# Patient Record
Sex: MALE | Race: WHITE | NOT HISPANIC OR LATINO | Employment: FULL TIME | URBAN - METROPOLITAN AREA
[De-identification: names, ages, dates, MRNs, and addresses within clinical notes are randomized per-mention and may not be internally consistent; named-entity substitution may affect disease eponyms.]

---

## 2021-03-20 ENCOUNTER — HOSPITAL ENCOUNTER (INPATIENT)
Facility: HOSPITAL | Age: 66
LOS: 2 days | Discharge: HOME | End: 2021-03-22
Attending: EMERGENCY MEDICINE | Admitting: HOSPITALIST

## 2021-03-20 ENCOUNTER — APPOINTMENT (OUTPATIENT)
Dept: GENERAL RADIOLOGY | Facility: HOSPITAL | Age: 66
End: 2021-03-20

## 2021-03-20 DIAGNOSIS — N18.31 STAGE 3A CHRONIC KIDNEY DISEASE (HCC): ICD-10-CM

## 2021-03-20 DIAGNOSIS — I20.0 UNSTABLE ANGINA (HCC): Primary | ICD-10-CM

## 2021-03-20 DIAGNOSIS — R07.2 PRECORDIAL PAIN: ICD-10-CM

## 2021-03-20 PROBLEM — I10 ESSENTIAL HYPERTENSION: Chronic | Status: ACTIVE | Noted: 2021-03-20

## 2021-03-20 PROBLEM — N18.30 CKD (CHRONIC KIDNEY DISEASE) STAGE 3, GFR 30-59 ML/MIN (HCC): Chronic | Status: ACTIVE | Noted: 2021-03-20

## 2021-03-20 PROBLEM — Z72.0 TOBACCO ABUSE: Chronic | Status: ACTIVE | Noted: 2021-03-20

## 2021-03-20 PROBLEM — E66.9 OBESITY (BMI 30-39.9): Chronic | Status: ACTIVE | Noted: 2021-03-20

## 2021-03-20 PROBLEM — G47.33 OSA (OBSTRUCTIVE SLEEP APNEA): Chronic | Status: ACTIVE | Noted: 2021-03-20

## 2021-03-20 PROBLEM — E78.5 HYPERLIPIDEMIA: Chronic | Status: ACTIVE | Noted: 2021-03-20

## 2021-03-20 PROBLEM — E11.9 DIABETES MELLITUS (HCC): Chronic | Status: ACTIVE | Noted: 2021-03-20

## 2021-03-20 PROBLEM — I25.10 CAD (CORONARY ARTERY DISEASE): Chronic | Status: ACTIVE | Noted: 2021-03-20

## 2021-03-20 LAB
ANION GAP SERPL CALCULATED.3IONS-SCNC: 10 MMOL/L (ref 5–15)
BASOPHILS # BLD AUTO: 0.1 10*3/MM3 (ref 0–0.2)
BASOPHILS NFR BLD AUTO: 0.7 % (ref 0–1.5)
BUN SERPL-MCNC: 28 MG/DL (ref 8–23)
BUN/CREAT SERPL: 19.6 (ref 7–25)
CALCIUM SPEC-SCNC: 9.7 MG/DL (ref 8.6–10.5)
CHLORIDE SERPL-SCNC: 103 MMOL/L (ref 98–107)
CO2 SERPL-SCNC: 27 MMOL/L (ref 22–29)
CREAT SERPL-MCNC: 1.43 MG/DL (ref 0.76–1.27)
DEPRECATED RDW RBC AUTO: 41.6 FL (ref 37–54)
EOSINOPHIL # BLD AUTO: 0.1 10*3/MM3 (ref 0–0.4)
EOSINOPHIL NFR BLD AUTO: 1.2 % (ref 0.3–6.2)
ERYTHROCYTE [DISTWIDTH] IN BLOOD BY AUTOMATED COUNT: 13.2 % (ref 12.3–15.4)
GFR SERPL CREATININE-BSD FRML MDRD: 49 ML/MIN/1.73
GLUCOSE SERPL-MCNC: 141 MG/DL (ref 65–99)
HCT VFR BLD AUTO: 42.4 % (ref 37.5–51)
HGB BLD-MCNC: 14.5 G/DL (ref 13–17.7)
LYMPHOCYTES # BLD AUTO: 1.6 10*3/MM3 (ref 0.7–3.1)
LYMPHOCYTES NFR BLD AUTO: 15.1 % (ref 19.6–45.3)
MCH RBC QN AUTO: 31.4 PG (ref 26.6–33)
MCHC RBC AUTO-ENTMCNC: 34.2 G/DL (ref 31.5–35.7)
MCV RBC AUTO: 92 FL (ref 79–97)
MONOCYTES # BLD AUTO: 0.7 10*3/MM3 (ref 0.1–0.9)
MONOCYTES NFR BLD AUTO: 6.4 % (ref 5–12)
NEUTROPHILS NFR BLD AUTO: 76.6 % (ref 42.7–76)
NEUTROPHILS NFR BLD AUTO: 8.1 10*3/MM3 (ref 1.7–7)
NRBC BLD AUTO-RTO: 0 /100 WBC (ref 0–0.2)
PLATELET # BLD AUTO: 228 10*3/MM3 (ref 140–450)
PMV BLD AUTO: 9.1 FL (ref 6–12)
POTASSIUM SERPL-SCNC: 3.8 MMOL/L (ref 3.5–5.2)
RBC # BLD AUTO: 4.61 10*6/MM3 (ref 4.14–5.8)
SODIUM SERPL-SCNC: 140 MMOL/L (ref 136–145)
TROPONIN T SERPL-MCNC: <0.01 NG/ML (ref 0–0.03)
WBC # BLD AUTO: 10.5 10*3/MM3 (ref 3.4–10.8)

## 2021-03-20 PROCEDURE — 80048 BASIC METABOLIC PNL TOTAL CA: CPT | Performed by: EMERGENCY MEDICINE

## 2021-03-20 PROCEDURE — G0378 HOSPITAL OBSERVATION PER HR: HCPCS

## 2021-03-20 PROCEDURE — 71045 X-RAY EXAM CHEST 1 VIEW: CPT

## 2021-03-20 PROCEDURE — 93005 ELECTROCARDIOGRAM TRACING: CPT | Performed by: EMERGENCY MEDICINE

## 2021-03-20 PROCEDURE — 84484 ASSAY OF TROPONIN QUANT: CPT | Performed by: EMERGENCY MEDICINE

## 2021-03-20 PROCEDURE — 85025 COMPLETE CBC W/AUTO DIFF WBC: CPT | Performed by: EMERGENCY MEDICINE

## 2021-03-20 PROCEDURE — 25010000002 ENOXAPARIN PER 10 MG: Performed by: INTERNAL MEDICINE

## 2021-03-20 PROCEDURE — 99222 1ST HOSP IP/OBS MODERATE 55: CPT | Performed by: PHYSICIAN ASSISTANT

## 2021-03-20 PROCEDURE — 25010000002 ENOXAPARIN PER 10 MG: Performed by: PHYSICIAN ASSISTANT

## 2021-03-20 PROCEDURE — 99285 EMERGENCY DEPT VISIT HI MDM: CPT

## 2021-03-20 PROCEDURE — U0003 INFECTIOUS AGENT DETECTION BY NUCLEIC ACID (DNA OR RNA); SEVERE ACUTE RESPIRATORY SYNDROME CORONAVIRUS 2 (SARS-COV-2) (CORONAVIRUS DISEASE [COVID-19]), AMPLIFIED PROBE TECHNIQUE, MAKING USE OF HIGH THROUGHPUT TECHNOLOGIES AS DESCRIBED BY CMS-2020-01-R: HCPCS | Performed by: HOSPITALIST

## 2021-03-20 RX ORDER — MAGNESIUM SULFATE HEPTAHYDRATE 40 MG/ML
2 INJECTION, SOLUTION INTRAVENOUS AS NEEDED
Status: DISCONTINUED | OUTPATIENT
Start: 2021-03-20 | End: 2021-03-22 | Stop reason: HOSPADM

## 2021-03-20 RX ORDER — SODIUM CHLORIDE 0.9 % (FLUSH) 0.9 %
10 SYRINGE (ML) INJECTION EVERY 12 HOURS SCHEDULED
Status: DISCONTINUED | OUTPATIENT
Start: 2021-03-20 | End: 2021-03-22 | Stop reason: HOSPADM

## 2021-03-20 RX ORDER — CHOLECALCIFEROL (VITAMIN D3) 125 MCG
5 CAPSULE ORAL NIGHTLY PRN
Status: DISCONTINUED | OUTPATIENT
Start: 2021-03-20 | End: 2021-03-22 | Stop reason: HOSPADM

## 2021-03-20 RX ORDER — INSULIN LISPRO 100 [IU]/ML
0-9 INJECTION, SOLUTION INTRAVENOUS; SUBCUTANEOUS
Status: DISCONTINUED | OUTPATIENT
Start: 2021-03-21 | End: 2021-03-22 | Stop reason: HOSPADM

## 2021-03-20 RX ORDER — ASPIRIN 81 MG/1
324 TABLET, CHEWABLE ORAL ONCE
Status: COMPLETED | OUTPATIENT
Start: 2021-03-20 | End: 2021-03-20

## 2021-03-20 RX ORDER — LOSARTAN POTASSIUM 50 MG/1
50 TABLET ORAL DAILY
Status: DISCONTINUED | OUTPATIENT
Start: 2021-03-21 | End: 2021-03-22 | Stop reason: HOSPADM

## 2021-03-20 RX ORDER — INSULIN LISPRO 100 [IU]/ML
0-9 INJECTION, SOLUTION INTRAVENOUS; SUBCUTANEOUS AS NEEDED
Status: DISCONTINUED | OUTPATIENT
Start: 2021-03-20 | End: 2021-03-22 | Stop reason: HOSPADM

## 2021-03-20 RX ORDER — ATORVASTATIN CALCIUM 20 MG/1
20 TABLET, FILM COATED ORAL DAILY
Status: DISCONTINUED | OUTPATIENT
Start: 2021-03-21 | End: 2021-03-22 | Stop reason: HOSPADM

## 2021-03-20 RX ORDER — POTASSIUM CHLORIDE 20 MEQ/1
40 TABLET, EXTENDED RELEASE ORAL AS NEEDED
Status: DISCONTINUED | OUTPATIENT
Start: 2021-03-20 | End: 2021-03-22 | Stop reason: HOSPADM

## 2021-03-20 RX ORDER — ATORVASTATIN CALCIUM 20 MG/1
20 TABLET, FILM COATED ORAL DAILY
COMMUNITY

## 2021-03-20 RX ORDER — LOSARTAN POTASSIUM 100 MG/1
100 TABLET ORAL DAILY
COMMUNITY
End: 2021-12-26 | Stop reason: HOSPADM

## 2021-03-20 RX ORDER — POTASSIUM CHLORIDE 1.5 G/1.77G
40 POWDER, FOR SOLUTION ORAL AS NEEDED
Status: DISCONTINUED | OUTPATIENT
Start: 2021-03-20 | End: 2021-03-22 | Stop reason: HOSPADM

## 2021-03-20 RX ORDER — ONDANSETRON 4 MG/1
4 TABLET, FILM COATED ORAL EVERY 6 HOURS PRN
Status: DISCONTINUED | OUTPATIENT
Start: 2021-03-20 | End: 2021-03-22 | Stop reason: HOSPADM

## 2021-03-20 RX ORDER — AMLODIPINE BESYLATE 5 MG/1
10 TABLET ORAL DAILY
Status: DISCONTINUED | OUTPATIENT
Start: 2021-03-21 | End: 2021-03-22 | Stop reason: HOSPADM

## 2021-03-20 RX ORDER — AMLODIPINE BESYLATE 10 MG/1
10 TABLET ORAL DAILY
COMMUNITY
End: 2022-09-12

## 2021-03-20 RX ORDER — ACETAMINOPHEN 650 MG/1
650 SUPPOSITORY RECTAL EVERY 4 HOURS PRN
Status: DISCONTINUED | OUTPATIENT
Start: 2021-03-20 | End: 2021-03-22 | Stop reason: HOSPADM

## 2021-03-20 RX ORDER — NICOTINE 21 MG/24HR
1 PATCH, TRANSDERMAL 24 HOURS TRANSDERMAL EVERY 24 HOURS
Status: DISCONTINUED | OUTPATIENT
Start: 2021-03-20 | End: 2021-03-22 | Stop reason: HOSPADM

## 2021-03-20 RX ORDER — MAGNESIUM SULFATE HEPTAHYDRATE 40 MG/ML
4 INJECTION, SOLUTION INTRAVENOUS AS NEEDED
Status: DISCONTINUED | OUTPATIENT
Start: 2021-03-20 | End: 2021-03-22 | Stop reason: HOSPADM

## 2021-03-20 RX ORDER — NICOTINE POLACRILEX 4 MG
15 LOZENGE BUCCAL
Status: DISCONTINUED | OUTPATIENT
Start: 2021-03-20 | End: 2021-03-22 | Stop reason: HOSPADM

## 2021-03-20 RX ORDER — CALCIUM GLUCONATE 20 MG/ML
1 INJECTION, SOLUTION INTRAVENOUS AS NEEDED
Status: DISCONTINUED | OUTPATIENT
Start: 2021-03-20 | End: 2021-03-22 | Stop reason: HOSPADM

## 2021-03-20 RX ORDER — SODIUM CHLORIDE 0.9 % (FLUSH) 0.9 %
10 SYRINGE (ML) INJECTION AS NEEDED
Status: DISCONTINUED | OUTPATIENT
Start: 2021-03-20 | End: 2021-03-22 | Stop reason: HOSPADM

## 2021-03-20 RX ORDER — DEXTROSE MONOHYDRATE 25 G/50ML
25 INJECTION, SOLUTION INTRAVENOUS
Status: DISCONTINUED | OUTPATIENT
Start: 2021-03-20 | End: 2021-03-22 | Stop reason: HOSPADM

## 2021-03-20 RX ORDER — ASPIRIN 81 MG/1
81 TABLET ORAL DAILY
Status: DISCONTINUED | OUTPATIENT
Start: 2021-03-21 | End: 2021-03-21

## 2021-03-20 RX ORDER — CALCIUM GLUCONATE 20 MG/ML
2 INJECTION, SOLUTION INTRAVENOUS AS NEEDED
Status: DISCONTINUED | OUTPATIENT
Start: 2021-03-20 | End: 2021-03-22 | Stop reason: HOSPADM

## 2021-03-20 RX ORDER — ACETAMINOPHEN 325 MG/1
650 TABLET ORAL EVERY 4 HOURS PRN
Status: DISCONTINUED | OUTPATIENT
Start: 2021-03-20 | End: 2021-03-22 | Stop reason: HOSPADM

## 2021-03-20 RX ORDER — NITROGLYCERIN 0.4 MG/1
0.4 TABLET SUBLINGUAL
Status: DISCONTINUED | OUTPATIENT
Start: 2021-03-20 | End: 2021-03-22 | Stop reason: HOSPADM

## 2021-03-20 RX ORDER — ONDANSETRON 2 MG/ML
4 INJECTION INTRAMUSCULAR; INTRAVENOUS EVERY 6 HOURS PRN
Status: DISCONTINUED | OUTPATIENT
Start: 2021-03-20 | End: 2021-03-22 | Stop reason: HOSPADM

## 2021-03-20 RX ORDER — ACETAMINOPHEN 160 MG/5ML
650 SOLUTION ORAL EVERY 4 HOURS PRN
Status: DISCONTINUED | OUTPATIENT
Start: 2021-03-20 | End: 2021-03-22 | Stop reason: HOSPADM

## 2021-03-20 RX ADMIN — ENOXAPARIN SODIUM 40 MG: 40 INJECTION SUBCUTANEOUS at 22:10

## 2021-03-20 RX ADMIN — Medication 10 ML: at 22:10

## 2021-03-20 RX ADMIN — ASPIRIN 324 MG: 81 TABLET, CHEWABLE ORAL at 19:44

## 2021-03-21 ENCOUNTER — APPOINTMENT (OUTPATIENT)
Dept: NUCLEAR MEDICINE | Facility: HOSPITAL | Age: 66
End: 2021-03-21

## 2021-03-21 PROBLEM — I20.0 UNSTABLE ANGINA (HCC): Status: ACTIVE | Noted: 2021-03-20

## 2021-03-21 LAB
ACT BLD: 257 SECONDS (ref 89–137)
ANION GAP SERPL CALCULATED.3IONS-SCNC: 8 MMOL/L (ref 5–15)
BASOPHILS # BLD AUTO: 0.1 10*3/MM3 (ref 0–0.2)
BASOPHILS NFR BLD AUTO: 0.9 % (ref 0–1.5)
BH CV REST NUCLEAR ISOTOPE DOSE: 7.7 MCI
BH CV STRESS BP STAGE 1: NORMAL
BH CV STRESS BP STAGE 2: NORMAL
BH CV STRESS BP STAGE 3: NORMAL
BH CV STRESS BP STAGE 4: NORMAL
BH CV STRESS COMMENTS STAGE 1: NORMAL
BH CV STRESS DOSE REGADENOSON STAGE 1: 0.4
BH CV STRESS DURATION MIN STAGE 1: 1
BH CV STRESS DURATION MIN STAGE 2: 1
BH CV STRESS DURATION MIN STAGE 3: 1
BH CV STRESS DURATION MIN STAGE 4: 1
BH CV STRESS DURATION SEC STAGE 2: 0
BH CV STRESS HR STAGE 1: 120
BH CV STRESS HR STAGE 2: 115
BH CV STRESS HR STAGE 3: 107
BH CV STRESS HR STAGE 4: 100
BH CV STRESS NUCLEAR ISOTOPE DOSE: 21.6 MCI
BH CV STRESS PROTOCOL 1: NORMAL
BH CV STRESS RECOVERY BP: NORMAL MMHG
BH CV STRESS RECOVERY HR: 90 BPM
BH CV STRESS STAGE 1: 1
BH CV STRESS STAGE 2: 2
BH CV STRESS STAGE 3: 3
BH CV STRESS STAGE 4: 4
BUN SERPL-MCNC: 27 MG/DL (ref 8–23)
BUN/CREAT SERPL: 20.3 (ref 7–25)
CALCIUM SPEC-SCNC: 9.2 MG/DL (ref 8.6–10.5)
CHLORIDE SERPL-SCNC: 105 MMOL/L (ref 98–107)
CHOLEST SERPL-MCNC: 99 MG/DL (ref 0–200)
CO2 SERPL-SCNC: 26 MMOL/L (ref 22–29)
CREAT SERPL-MCNC: 1.33 MG/DL (ref 0.76–1.27)
DEPRECATED RDW RBC AUTO: 43.8 FL (ref 37–54)
EOSINOPHIL # BLD AUTO: 0.2 10*3/MM3 (ref 0–0.4)
EOSINOPHIL NFR BLD AUTO: 2.1 % (ref 0.3–6.2)
ERYTHROCYTE [DISTWIDTH] IN BLOOD BY AUTOMATED COUNT: 13.6 % (ref 12.3–15.4)
GFR SERPL CREATININE-BSD FRML MDRD: 54 ML/MIN/1.73
GLUCOSE BLDC GLUCOMTR-MCNC: 141 MG/DL (ref 70–105)
GLUCOSE BLDC GLUCOMTR-MCNC: 97 MG/DL (ref 70–105)
GLUCOSE SERPL-MCNC: 89 MG/DL (ref 65–99)
HCT VFR BLD AUTO: 40.8 % (ref 37.5–51)
HDLC SERPL-MCNC: 35 MG/DL (ref 40–60)
HGB BLD-MCNC: 13.8 G/DL (ref 13–17.7)
LDLC SERPL CALC-MCNC: 31 MG/DL (ref 0–100)
LDLC/HDLC SERPL: 0.61 {RATIO}
LV EF NUC BP: 57 %
LYMPHOCYTES # BLD AUTO: 2.5 10*3/MM3 (ref 0.7–3.1)
LYMPHOCYTES NFR BLD AUTO: 28 % (ref 19.6–45.3)
MAGNESIUM SERPL-MCNC: 2 MG/DL (ref 1.6–2.4)
MAXIMAL PREDICTED HEART RATE: 154 BPM
MCH RBC QN AUTO: 31.1 PG (ref 26.6–33)
MCHC RBC AUTO-ENTMCNC: 33.9 G/DL (ref 31.5–35.7)
MCV RBC AUTO: 91.6 FL (ref 79–97)
MONOCYTES # BLD AUTO: 0.7 10*3/MM3 (ref 0.1–0.9)
MONOCYTES NFR BLD AUTO: 7.3 % (ref 5–12)
NEUTROPHILS NFR BLD AUTO: 5.6 10*3/MM3 (ref 1.7–7)
NEUTROPHILS NFR BLD AUTO: 61.7 % (ref 42.7–76)
NRBC BLD AUTO-RTO: 0.1 /100 WBC (ref 0–0.2)
PERCENT MAX PREDICTED HR: 79.22 %
PLATELET # BLD AUTO: 221 10*3/MM3 (ref 140–450)
PMV BLD AUTO: 9 FL (ref 6–12)
POTASSIUM SERPL-SCNC: 4.2 MMOL/L (ref 3.5–5.2)
QT INTERVAL: 339 MS
RBC # BLD AUTO: 4.45 10*6/MM3 (ref 4.14–5.8)
SARS-COV-2 RNA PNL SPEC NAA+PROBE: NOT DETECTED
SODIUM SERPL-SCNC: 139 MMOL/L (ref 136–145)
STRESS BASELINE BP: NORMAL MMHG
STRESS BASELINE HR: 89 BPM
STRESS PERCENT HR: 93 %
STRESS POST PEAK BP: NORMAL MMHG
STRESS POST PEAK HR: 122 BPM
STRESS TARGET HR: 131 BPM
TRIGL SERPL-MCNC: 213 MG/DL (ref 0–150)
TROPONIN T SERPL-MCNC: <0.01 NG/ML (ref 0–0.03)
VLDLC SERPL-MCNC: 33 MG/DL (ref 5–40)
WBC # BLD AUTO: 9 10*3/MM3 (ref 3.4–10.8)

## 2021-03-21 PROCEDURE — 93018 CV STRESS TEST I&R ONLY: CPT | Performed by: INTERNAL MEDICINE

## 2021-03-21 PROCEDURE — A9500 TC99M SESTAMIBI: HCPCS | Performed by: HOSPITALIST

## 2021-03-21 PROCEDURE — C1887 CATHETER, GUIDING: HCPCS | Performed by: INTERNAL MEDICINE

## 2021-03-21 PROCEDURE — 99152 MOD SED SAME PHYS/QHP 5/>YRS: CPT | Performed by: INTERNAL MEDICINE

## 2021-03-21 PROCEDURE — C1894 INTRO/SHEATH, NON-LASER: HCPCS | Performed by: INTERNAL MEDICINE

## 2021-03-21 PROCEDURE — 99232 SBSQ HOSP IP/OBS MODERATE 35: CPT | Performed by: HOSPITALIST

## 2021-03-21 PROCEDURE — 0 IOPAMIDOL PER 1 ML: Performed by: INTERNAL MEDICINE

## 2021-03-21 PROCEDURE — 93017 CV STRESS TEST TRACING ONLY: CPT

## 2021-03-21 PROCEDURE — G0378 HOSPITAL OBSERVATION PER HR: HCPCS

## 2021-03-21 PROCEDURE — B2181ZZ FLUOROSCOPY OF LEFT INTERNAL MAMMARY BYPASS GRAFT USING LOW OSMOLAR CONTRAST: ICD-10-PCS | Performed by: INTERNAL MEDICINE

## 2021-03-21 PROCEDURE — 027034Z DILATION OF CORONARY ARTERY, ONE ARTERY WITH DRUG-ELUTING INTRALUMINAL DEVICE, PERCUTANEOUS APPROACH: ICD-10-PCS | Performed by: INTERNAL MEDICINE

## 2021-03-21 PROCEDURE — 25010000002 REGADENOSON 0.4 MG/5ML SOLUTION: Performed by: HOSPITALIST

## 2021-03-21 PROCEDURE — 84484 ASSAY OF TROPONIN QUANT: CPT | Performed by: PHYSICIAN ASSISTANT

## 2021-03-21 PROCEDURE — C1769 GUIDE WIRE: HCPCS | Performed by: INTERNAL MEDICINE

## 2021-03-21 PROCEDURE — 25010000002 MIDAZOLAM PER 1 MG: Performed by: INTERNAL MEDICINE

## 2021-03-21 PROCEDURE — 85025 COMPLETE CBC W/AUTO DIFF WBC: CPT | Performed by: PHYSICIAN ASSISTANT

## 2021-03-21 PROCEDURE — C1725 CATH, TRANSLUMIN NON-LASER: HCPCS | Performed by: INTERNAL MEDICINE

## 2021-03-21 PROCEDURE — 93459 L HRT ART/GRFT ANGIO: CPT | Performed by: INTERNAL MEDICINE

## 2021-03-21 PROCEDURE — 92937 PRQ TRLUML REVSC CAB GRF 1: CPT | Performed by: INTERNAL MEDICINE

## 2021-03-21 PROCEDURE — 25010000002 HEPARIN (PORCINE) PER 1000 UNITS: Performed by: INTERNAL MEDICINE

## 2021-03-21 PROCEDURE — 4A023N7 MEASUREMENT OF CARDIAC SAMPLING AND PRESSURE, LEFT HEART, PERCUTANEOUS APPROACH: ICD-10-PCS | Performed by: INTERNAL MEDICINE

## 2021-03-21 PROCEDURE — 0 TECHNETIUM SESTAMIBI: Performed by: HOSPITALIST

## 2021-03-21 PROCEDURE — 80048 BASIC METABOLIC PNL TOTAL CA: CPT | Performed by: PHYSICIAN ASSISTANT

## 2021-03-21 PROCEDURE — 78452 HT MUSCLE IMAGE SPECT MULT: CPT | Performed by: INTERNAL MEDICINE

## 2021-03-21 PROCEDURE — 82962 GLUCOSE BLOOD TEST: CPT

## 2021-03-21 PROCEDURE — B2151ZZ FLUOROSCOPY OF LEFT HEART USING LOW OSMOLAR CONTRAST: ICD-10-PCS | Performed by: INTERNAL MEDICINE

## 2021-03-21 PROCEDURE — C1874 STENT, COATED/COV W/DEL SYS: HCPCS | Performed by: INTERNAL MEDICINE

## 2021-03-21 PROCEDURE — 80061 LIPID PANEL: CPT | Performed by: PHYSICIAN ASSISTANT

## 2021-03-21 PROCEDURE — 83036 HEMOGLOBIN GLYCOSYLATED A1C: CPT | Performed by: PHYSICIAN ASSISTANT

## 2021-03-21 PROCEDURE — 25010000002 FENTANYL CITRATE (PF) 100 MCG/2ML SOLUTION: Performed by: INTERNAL MEDICINE

## 2021-03-21 PROCEDURE — B2131ZZ FLUOROSCOPY OF MULTIPLE CORONARY ARTERY BYPASS GRAFTS USING LOW OSMOLAR CONTRAST: ICD-10-PCS | Performed by: INTERNAL MEDICINE

## 2021-03-21 PROCEDURE — 78452 HT MUSCLE IMAGE SPECT MULT: CPT

## 2021-03-21 PROCEDURE — 83735 ASSAY OF MAGNESIUM: CPT | Performed by: PHYSICIAN ASSISTANT

## 2021-03-21 PROCEDURE — B2111ZZ FLUOROSCOPY OF MULTIPLE CORONARY ARTERIES USING LOW OSMOLAR CONTRAST: ICD-10-PCS | Performed by: INTERNAL MEDICINE

## 2021-03-21 PROCEDURE — 25010000002 HYDROMORPHONE PER 4 MG: Performed by: INTERNAL MEDICINE

## 2021-03-21 PROCEDURE — 85347 COAGULATION TIME ACTIVATED: CPT

## 2021-03-21 PROCEDURE — 99153 MOD SED SAME PHYS/QHP EA: CPT | Performed by: INTERNAL MEDICINE

## 2021-03-21 PROCEDURE — C1760 CLOSURE DEV, VASC: HCPCS | Performed by: INTERNAL MEDICINE

## 2021-03-21 PROCEDURE — 99254 IP/OBS CNSLTJ NEW/EST MOD 60: CPT | Performed by: INTERNAL MEDICINE

## 2021-03-21 PROCEDURE — C9604 PERC D-E COR REVASC T CABG S: HCPCS | Performed by: INTERNAL MEDICINE

## 2021-03-21 DEVICE — XIENCE SIERRA™ EVEROLIMUS ELUTING CORONARY STENT SYSTEM 4.00 MM X 33 MM / RAPID-EXCHANGE
Type: IMPLANTABLE DEVICE | Status: FUNCTIONAL
Brand: XIENCE SIERRA™

## 2021-03-21 RX ORDER — ASPIRIN 81 MG/1
81 TABLET ORAL DAILY
Status: DISCONTINUED | OUTPATIENT
Start: 2021-03-22 | End: 2021-03-22 | Stop reason: HOSPADM

## 2021-03-21 RX ORDER — FENTANYL CITRATE 50 UG/ML
INJECTION, SOLUTION INTRAMUSCULAR; INTRAVENOUS AS NEEDED
Status: DISCONTINUED | OUTPATIENT
Start: 2021-03-21 | End: 2021-03-21 | Stop reason: HOSPADM

## 2021-03-21 RX ORDER — SODIUM CHLORIDE 9 MG/ML
100 INJECTION, SOLUTION INTRAVENOUS CONTINUOUS
Status: DISCONTINUED | OUTPATIENT
Start: 2021-03-21 | End: 2021-03-22

## 2021-03-21 RX ORDER — NITROGLYCERIN 5 MG/ML
INJECTION, SOLUTION INTRAVENOUS AS NEEDED
Status: DISCONTINUED | OUTPATIENT
Start: 2021-03-21 | End: 2021-03-21 | Stop reason: HOSPADM

## 2021-03-21 RX ORDER — HYDROMORPHONE HCL 110MG/55ML
PATIENT CONTROLLED ANALGESIA SYRINGE INTRAVENOUS AS NEEDED
Status: DISCONTINUED | OUTPATIENT
Start: 2021-03-21 | End: 2021-03-21 | Stop reason: HOSPADM

## 2021-03-21 RX ORDER — ACETAMINOPHEN 325 MG/1
650 TABLET ORAL EVERY 4 HOURS PRN
Status: DISCONTINUED | OUTPATIENT
Start: 2021-03-21 | End: 2021-03-21 | Stop reason: SDUPTHER

## 2021-03-21 RX ORDER — MIDAZOLAM HYDROCHLORIDE 1 MG/ML
INJECTION INTRAMUSCULAR; INTRAVENOUS AS NEEDED
Status: DISCONTINUED | OUTPATIENT
Start: 2021-03-21 | End: 2021-03-21 | Stop reason: HOSPADM

## 2021-03-21 RX ORDER — LIDOCAINE HYDROCHLORIDE 20 MG/ML
INJECTION, SOLUTION INFILTRATION; PERINEURAL AS NEEDED
Status: DISCONTINUED | OUTPATIENT
Start: 2021-03-21 | End: 2021-03-21 | Stop reason: HOSPADM

## 2021-03-21 RX ORDER — HEPARIN SODIUM 1000 [USP'U]/ML
INJECTION, SOLUTION INTRAVENOUS; SUBCUTANEOUS AS NEEDED
Status: DISCONTINUED | OUTPATIENT
Start: 2021-03-21 | End: 2021-03-21 | Stop reason: HOSPADM

## 2021-03-21 RX ORDER — ASPIRIN 325 MG
TABLET ORAL AS NEEDED
Status: DISCONTINUED | OUTPATIENT
Start: 2021-03-21 | End: 2021-03-21 | Stop reason: HOSPADM

## 2021-03-21 RX ADMIN — LOSARTAN POTASSIUM 50 MG: 50 TABLET, FILM COATED ORAL at 10:25

## 2021-03-21 RX ADMIN — AMLODIPINE BESYLATE 10 MG: 5 TABLET ORAL at 10:25

## 2021-03-21 RX ADMIN — TECHNETIUM TC 99M SESTAMIBI 1 DOSE: 1 INJECTION INTRAVENOUS at 07:23

## 2021-03-21 RX ADMIN — TECHNETIUM TC 99M SESTAMIBI 1 DOSE: 1 INJECTION INTRAVENOUS at 09:15

## 2021-03-21 RX ADMIN — SODIUM CHLORIDE 100 ML/HR: 9 INJECTION, SOLUTION INTRAVENOUS at 23:28

## 2021-03-21 RX ADMIN — SODIUM CHLORIDE 100 ML/HR: 9 INJECTION, SOLUTION INTRAVENOUS at 18:47

## 2021-03-21 RX ADMIN — ATORVASTATIN CALCIUM 20 MG: 20 TABLET, FILM COATED ORAL at 10:25

## 2021-03-21 RX ADMIN — Medication 10 ML: at 10:24

## 2021-03-21 RX ADMIN — REGADENOSON 0.4 MG: 0.08 INJECTION, SOLUTION INTRAVENOUS at 09:15

## 2021-03-21 RX ADMIN — ASPIRIN 81 MG: 81 TABLET, COATED ORAL at 10:24

## 2021-03-21 RX ADMIN — TICAGRELOR 90 MG: 90 TABLET ORAL at 22:04

## 2021-03-21 RX ADMIN — Medication 10 ML: at 22:07

## 2021-03-21 RX ADMIN — TICAGRELOR 90 MG: 90 TABLET ORAL at 21:45

## 2021-03-21 RX ADMIN — NITROGLYCERIN 0.4 MG: 0.4 TABLET SUBLINGUAL at 09:20

## 2021-03-21 NOTE — PLAN OF CARE
Goal Outcome Evaluation:  Plan of Care Reviewed With: patient     Outcome Summary: patient currently resting with no new complaints, patient to have a myoview this morning

## 2021-03-21 NOTE — PLAN OF CARE
Goal Outcome Evaluation:     Progress: no change  Outcome Summary: Patient had an abnormal myoview this morning. Cardiology consulted and patient is going for cardiac cath today with Dr. Pillai. Patient is stable, no complaints at this time. Patient NPO and consent signed and on the chart. Will continue to monitor.

## 2021-03-21 NOTE — H&P
[facility] Medicine Services      Patient Name: Jamil Self  : 1955  MRN: [id number]  Primary Care Physician: Desiree Almaguer APRN  Date of admission: 3/20/2021    Patient Care Team:  Desiree Almaguer APRN as PCP - General          Subjective   History Present Illness     Chief Complaint:   Chief Complaint   Patient presents with   • Chest Pain         Mr. Self is a 65 y.o. male with past medical history of CAD status post CABG, tobacco abuse, obesity, LIZETTE, hyperlipidemia, hypertension, diabetes and CKD who presents to [facility] complaining of chest pain.  Patient reports that on the evening of 3/20/2021 while at rest he began to experience a substernal pressure which radiated to his left arm rated at 9/10 at its worst along with some diaphoresis.  Pain was noted is somewhat worse if he attempted to exert himself and slightly relieved with rest though remained constant for several hours until he presented to the ED when pain began to resolve.  No dyspnea, nausea or vomiting, cough or fever, sensation of tachycardia or palpitations, syncope or near syncope are reported.  Patient does have a history of previous CABG approximately 15 years prior and states that he has done generally well since that point and is not currently followed by cardiologist.  He reports that he does continue to smoke approximately 1-1/2 packs of cigarettes per day but does not drink alcohol.  Patient is compliant with all of his other outpatient medical therapies.    In the ED patient had lab significant for troponin of less than 0.010, creatinine: 1.43 with a BUN of 28 and a GFR of 49.  Remainder of CBC and CMP was generally unremarkable.  Chest x-ray shows cardiomegaly with no acute cardiopulmonary disease.  EKG shows sinus rhythm at 99 with some mild ST depression in lead I, aVL, V5 and V6 with no other obvious ectopy and a QTC of 435 ms    History of Present Illness    Review of Systems    Constitutional: Positive for diaphoresis. Negative for chills, fever and malaise/fatigue.   HENT: Negative.    Eyes: Negative.    Cardiovascular: Positive for chest pain and leg swelling. Negative for dyspnea on exertion, irregular heartbeat, near-syncope, orthopnea and syncope.   Respiratory: Negative for cough, shortness of breath and sputum production.    Endocrine: Negative.    Skin: Negative.    Musculoskeletal: Negative.    Gastrointestinal: Negative.    Genitourinary: Negative.    Neurological: Negative.    Psychiatric/Behavioral: Negative.            Personal History     Past Medical History: No past medical history on file.    Surgical History:    No past surgical history on file.        Family History: family history is not on file. Otherwise pertinent FHx was reviewed and unremarkable.     Social History:        Medications:  Prior to Admission medications    Not on File       Allergies:  No Known Allergies    Objective   Objective     Vital Signs  Temp:  [97.5 °F (36.4 °C)] 97.5 °F (36.4 °C)  Heart Rate:  [88-97] 88  Resp:  [16-18] 16  BP: (130-187)/(59-97) 146/77  SpO2:  [95 %-97 %] 95 %  on   ;   Device (Oxygen Therapy): room air  Body mass index is 37.13 kg/m².    Physical Exam  Vitals reviewed.   Constitutional:       General: He is not in acute distress.     Appearance: Normal appearance. He is obese. He is not ill-appearing or toxic-appearing.   HENT:      Head: Normocephalic and atraumatic.      Right Ear: External ear normal.      Left Ear: External ear normal.      Nose: Nose normal.      Mouth/Throat:      Mouth: Mucous membranes are moist.   Eyes:      Extraocular Movements: Extraocular movements intact.   Cardiovascular:      Rate and Rhythm: Normal rate and regular rhythm.      Pulses: Normal pulses.      Heart sounds: Normal heart sounds.   Pulmonary:      Effort: Pulmonary effort is normal.      Breath sounds: Wheezing present.   Abdominal:      General: Bowel sounds are normal. There is  no distension.      Tenderness: There is no abdominal tenderness.   Musculoskeletal:         General: Normal range of motion.      Cervical back: Normal range of motion.      Right lower leg: Edema present.      Left lower leg: Edema present.      Comments: Mild lower extremity edema noted   Skin:     General: Skin is warm and dry.   Neurological:      General: No focal deficit present.      Mental Status: He is alert and oriented to person, place, and time.   Psychiatric:         Mood and Affect: Mood normal.         Behavior: Behavior normal.         Thought Content: Thought content normal.         Judgment: Judgment normal.           Results Review:  I have personally reviewed most recent cardiac tracings, lab results and radiology images and interpretations and agree with findings, most notably: Troponin, CBC, CMP, chest x-ray and EKG.    Results from last 7 days   Lab Units 03/20/21 1939   WBC 10*3/mm3 10.50   HEMOGLOBIN g/dL 14.5   HEMATOCRIT % 42.4   PLATELETS 10*3/mm3 228     Results from last 7 days   Lab Units 03/20/21 1939   SODIUM mmol/L 140   POTASSIUM mmol/L 3.8   CHLORIDE mmol/L 103   CO2 mmol/L 27.0   BUN mg/dL 28*   CREATININE mg/dL 1.43*   GLUCOSE mg/dL 141*   CALCIUM mg/dL 9.7   TROPONIN T ng/mL <0.010     Estimated Creatinine Clearance: 68.1 mL/min (A) (by C-G formula based on SCr of 1.43 mg/dL (H)).  Brief Urine Lab Results     None          Microbiology Results (last 10 days)     ** No results found for the last 240 hours. **          ECG/EMG Results (most recent)     Procedure Component Value Units Date/Time    ECG 12 Lead [[id number]] Collected: 03/20/21 1915     Updated: 03/20/21 1918     QT Interval 339 ms     Narrative:      HEART RATE= 99  bpm  RR Interval= 608  ms  IA Interval= 172  ms  P Horizontal Axis= -8  deg  P Front Axis= 57  deg  QRSD Interval= 92  ms  QT Interval= 339  ms  QRS Axis= 20  deg  T Wave Axis= 97  deg  - ABNORMAL ECG -  Sinus rhythm  Probable left atrial  enlargement  Nonspecific T abnormalities, lateral leads  Electronically Signed By:   Date and Time of Study: 2021-03-20 19:15:57                  XR Chest 1 View    Result Date: 3/20/2021    1.  Borderline heart size.  No acute cardiopulmonary disease.   Electronically Signed By-Jose Jane MD On:3/20/2021 7:13 PM This report was finalized on [id number] by  Jose Jane MD.        Estimated Creatinine Clearance: 68.1 mL/min (A) (by C-G formula based on SCr of 1.43 mg/dL (H)).    Assessment/Plan   Assessment/Plan       Active Hospital Problems    Diagnosis  POA   • **Precordial pain [R07.2]  Yes     Priority: High   • Essential hypertension [I10]  Yes     Priority: Medium   • Diabetes mellitus (CMS/HCC) [E11.9]  Yes     Priority: Medium   • CAD (coronary artery disease) [I25.10]  Yes     Priority: Medium   • CKD (chronic kidney disease) stage 3, GFR 30-59 ml/min (CMS/HCC) [N18.30]  Yes     Priority: Medium   • Hyperlipidemia [E78.5]  Yes     Priority: Low   • Tobacco abuse [Z72.0]  Yes     Priority: Low   • LIZETTE (obstructive sleep apnea) [G47.33]  Yes     Priority: Low   • Obesity (BMI 30-39.9) [E66.9]  Yes     Priority: Low      Resolved Hospital Problems   No resolved problems to display.     Chest pain  -Troponin: Less than 0.010, trend  -Chest x-ray shows cardiomegaly with no acute cardiopulmonary process  -EKG shows sinus rhythm at 99 with mild ST depression in lead I, aVL, V5 and V6 with a QTC of 435 ms  -In the ED patient given 324 mg aspirin and Nitropaste  -Check lipid panel  -Daily aspirin ordered  -N.p.o. after midnight  -Stress test ordered    CAD status post CABG  -Continue statin and cardiac monitoring  -Aspirin as above    Hypertension  -Poorly controlled with a blood pressure of 189/97 on admission (though pressure seems to have improved with resolution of pain, patient refused nitroglycerin in the ED)  -Continue losartan, Norvasc and hydrochlorothiazide  - Monitor while admitted    Diabetes  mellitus  -Well controlled with glucose of 141 on admission  - Check A1c  - Hold Metformin  -Sliding scale insulin  -diabetic diet  -monitor AC and HS    CKD  - Creatinine: 1.43, BUN: 28, eGFR: 49  - Avoid nephrotoxic medication and IV dye unless urgently needed  - Monitor BMP and I's and O's    Hyperlipidemia  -Check lipid panel  -Continue statin    Obesity (BMI: 37.13)  -Encourage diet lifestyle modifications    Tobacco abuse  -Patient reports he currently smokes 1.5 packs of cigarettes per day  -Encourage cessation especially in light of patient's comorbid conditions  -Nicotine patch ordered            VTE Prophylaxis -Lovenox   Mechanical Order History:     None      Pharmalogical Order History:     None          CODE STATUS: Full  There are no questions and answers to display.         I discussed the patient's findings and my recommendations with patient and nursing staff.      Signature:Electronically signed by Kalia Montes PA-C, 03/20/21, 9:43 PM EDT.    [facility] Hospitalist Team

## 2021-03-21 NOTE — ED PROVIDER NOTES
Subjective   History of Present Illness  Context: 65-year-old male presents with chest pain.  He states started with symptoms last night.  He describes a pressure.  He reports it improves at times it is worse at times.  Seems to improve with rest worsened with exertion.  He has had some shortness of breath he had broke out in a sweat earlier.  He has had no nausea he does not complain of radiation.  He describes it as just anterior chest.  Location: Anterior chest  Quality: Pressure  Duration: 1 day  Timing: Waxes and wanes  Severity: Mild to moderate   Modifying factors: Improves with rest worsens with exertion  Associated signs and symptoms: Dyspnea, diaphoresis    Review of Systems   Constitutional: Positive for diaphoresis. Negative for fever and unexpected weight change.   HENT: Positive for hearing loss. Negative for congestion and sore throat.    Eyes: Negative for pain and visual disturbance.   Respiratory: Positive for shortness of breath. Negative for cough.    Cardiovascular: Positive for chest pain. Negative for leg swelling.   Gastrointestinal: Negative for abdominal pain, diarrhea and vomiting.   Genitourinary: Negative for dysuria and urgency.   Musculoskeletal: Negative for back pain.   Skin: Negative for rash.   Neurological: Negative for dizziness, weakness and headaches.   Psychiatric/Behavioral: Negative for confusion.       No past medical history on file.  CABG, hypertension, hyperlipidemia  No Known Allergies    No past surgical history on file.    No family history on file.  Social history current smoker  Social History     Socioeconomic History   • Marital status:      Spouse name: Not on file   • Number of children: Not on file   • Years of education: Not on file   • Highest education level: Not on file     Current medications Lipitor Norvasc Cozaar      Objective   Physical Exam  65-year-old male awake alert.  Generally overweight.  Pupils equal round at light.  Oropharynx Colton  membranes moist neck supple chest clear equal breath sounds.  Cardiovascular regular rate and rhythm chest wall nontender abdomen soft nontender.  Extremities without tenderness edema.  Skin without rash noted.  Neurologic exam no focal findings noted.  Procedures           ED Course      Results for orders placed or performed during the hospital encounter of 03/20/21   Basic Metabolic Panel    Specimen: Arm, Right; Blood   Result Value Ref Range    Glucose 141 (H) 65 - 99 mg/dL    BUN 28 (H) 8 - 23 mg/dL    Creatinine 1.43 (H) 0.76 - 1.27 mg/dL    Sodium 140 136 - 145 mmol/L    Potassium 3.8 3.5 - 5.2 mmol/L    Chloride 103 98 - 107 mmol/L    CO2 27.0 22.0 - 29.0 mmol/L    Calcium 9.7 8.6 - 10.5 mg/dL    eGFR Non African Amer 49 (L) >60 mL/min/1.73    BUN/Creatinine Ratio 19.6 7.0 - 25.0    Anion Gap 10.0 5.0 - 15.0 mmol/L   Troponin    Specimen: Arm, Right; Blood   Result Value Ref Range    Troponin T <0.010 0.000 - 0.030 ng/mL   CBC Auto Differential    Specimen: Arm, Right; Blood   Result Value Ref Range    WBC 10.50 3.40 - 10.80 10*3/mm3    RBC 4.61 4.14 - 5.80 10*6/mm3    Hemoglobin 14.5 13.0 - 17.7 g/dL    Hematocrit 42.4 37.5 - 51.0 %    MCV 92.0 79.0 - 97.0 fL    MCH 31.4 26.6 - 33.0 pg    MCHC 34.2 31.5 - 35.7 g/dL    RDW 13.2 12.3 - 15.4 %    RDW-SD 41.6 37.0 - 54.0 fl    MPV 9.1 6.0 - 12.0 fL    Platelets 228 140 - 450 10*3/mm3    Neutrophil % 76.6 (H) 42.7 - 76.0 %    Lymphocyte % 15.1 (L) 19.6 - 45.3 %    Monocyte % 6.4 5.0 - 12.0 %    Eosinophil % 1.2 0.3 - 6.2 %    Basophil % 0.7 0.0 - 1.5 %    Neutrophils, Absolute 8.10 (H) 1.70 - 7.00 10*3/mm3    Lymphocytes, Absolute 1.60 0.70 - 3.10 10*3/mm3    Monocytes, Absolute 0.70 0.10 - 0.90 10*3/mm3    Eosinophils, Absolute 0.10 0.00 - 0.40 10*3/mm3    Basophils, Absolute 0.10 0.00 - 0.20 10*3/mm3    nRBC 0.0 0.0 - 0.2 /100 WBC   ECG 12 Lead   Result Value Ref Range    QT Interval 339 ms     XR Chest 1 View    Result Date: 3/20/2021    1.  Borderline heart  "size.  No acute cardiopulmonary disease.   Electronically Signed By-Jose Jane MD On:3/20/2021 7:13 PM This report was finalized on [id number] by  Jose Jane MD.    Medications   nitroglycerin (NITROSTAT) ointment 1 inch (1 inch Topical Not Given 3/20/21 1944)   aspirin chewable tablet 324 mg (324 mg Oral Given 3/20/21 1944)     /77   Pulse 88   Temp 97.5 °F (36.4 °C)   Resp 16   Ht 181.6 cm (71.5\")   Wt 122 kg (270 lb)   SpO2 95%   BMI 37.13 kg/m²                                          MDM  Chart review: Patient has had office visits for type 2 diabetes with 3 8 chronic kidney disease hypertension and hyperlipidemia  Comorbidity: As per past history  Differential: Chest wall pain, reflux, angina, MI,  My EKG interpretation: Sinus rhythm left atrial abnormality nonspecific ST-T wave abnormality.  Compared to previous no significant change  Lab: CBC without significant abnormality, basic metabolic panel glucose 141 BUN 28 creatinine 1.43 potassium 3.8, troponin negative  Radiology: I reviewed chest x-ray.  No acute cardiopulmonary abnormality noted there is borderline heart size noted.  Discussion/treatment: Patient IV placed.  Was given aspirin and Nitropaste was placed.  He had improvement in his blood pressure with treatment.  Patient's findings were discussed with him.  He was discussed with the PA for the hospitalist.  He was brought in for observation further cardiac evaluation.  His renal function is noted to be at baseline.  Patient was evaluated using appropriate PPE      Final diagnoses:   Precordial pain   Stage 3a chronic kidney disease (CMS/HCC)       ED Disposition  ED Disposition     ED Disposition Condition Comment    Decision to Admit            No follow-up provider specified.       Medication List      No changes were made to your prescriptions during this visit.          Alphonso Crandall MD  03/20/21 2032    "

## 2021-03-21 NOTE — PROGRESS NOTES
"      [facility] Medicine Services Daily Progress Note      Hospitalist Team  LOS 0 days      Patient Care Team:  Desiree Almaguer APRN as PCP - General    Patient Location: 112/1      Subjective   Subjective     Chief Complaint / Subjective  Chief Complaint   Patient presents with   • Chest Pain         Brief Synopsis of Hospital Course/HPI    The patient is a 65-year-old male with h/o CAD s/p CABG, LIZETTE, active tobacco use, obesity, hyperlipidemia, hypertension, DM and CKD stage III.    The patient presented to the emergency room on 3/20/2021 complaining of pressure-like substernal chest pain for several days with intermittent exacerbation.  The patient denies fevers, chills, sweats, cough, orthopnea, PND or increased lower extremity edema.      The ED, chest x-ray ruled out active cardiopulmonary pathology.  COVID-19 was ruled out in the ED.    Date::    3/21/21: Chest pain constant for several days with episodes of worsening chest pain not associated with exertion.  Has not seen a cardiologist for several years.  CABG 15 years ago.  CKD stage III.  LIZETTE.  Stress test ordered.      Review of Systems   All other systems reviewed and are negative.        Objective   Objective      Vital Signs  Temp:  [97.3 °F (36.3 °C)-97.9 °F (36.6 °C)] 97.3 °F (36.3 °C)  Heart Rate:  [82-97] 82  Resp:  [16-18] 18  BP: (121-187)/(59-97) 135/80  Oxygen Therapy  SpO2: 93 %  Pulse Oximetry Type: Intermittent  Device (Oxygen Therapy): room air  Flowsheet Rows      First Filed Value   Admission Height  181.6 cm (71.5\") Documented at 03/20/2021 1824   Admission Weight  122 kg (270 lb) Documented at 03/20/2021 1824        Intake & Output (last 3 days)       03/18 0701 - 03/19 0700 03/19 0701 - 03/20 0700 03/20 0701 - 03/21 0700 03/21 0701 - 03/22 0700            Urine Unmeasured Occurrence   1 x         Lines, Drains & Airways    Active LDAs     Name:   Placement date:   Placement time:   Site:   Days:    Peripheral IV " 03/20/21 1941 Right Antecubital   03/20/21 1941    Antecubital   less than 1                  Physical Exam:    Physical Exam  HENT:      Head: Normocephalic.      Nose: Nose normal.      Mouth/Throat:      Mouth: Mucous membranes are moist.   Eyes:      General: No scleral icterus.     Extraocular Movements: Extraocular movements intact.      Pupils: Pupils are equal, round, and reactive to light.   Cardiovascular:      Rate and Rhythm: Normal rate and regular rhythm.      Pulses: Normal pulses.   Pulmonary:      Effort: Pulmonary effort is normal.      Breath sounds: Normal breath sounds.   Abdominal:      General: Bowel sounds are normal.      Palpations: Abdomen is soft.   Musculoskeletal:         General: Normal range of motion.      Cervical back: Normal range of motion.   Skin:     General: Skin is warm.   Neurological:      General: No focal deficit present.      Mental Status: He is alert and oriented to person, place, and time.   Psychiatric:         Mood and Affect: Mood normal.           Procedures:        Results Review:     I reviewed the patient's new clinical results.      Lab Results (last 24 hours)     Procedure Component Value Units Date/Time    POC Glucose Once [[id number]]  (Normal) Collected: 03/21/21 0704    Specimen: Blood Updated: 03/21/21 0705     Glucose 97 mg/dL      Comment: Serial Number: [id number]:  [id number]       COVID-19,CEPHEID,COR/ELDA/PAD IN-HOUSE(OR EMERGENT/ADD-ON),NP SWAB IN TRANSPORT MEDIA 3-4 HR TAT, RT-PCR - Swab, Nasopharynx [[id number]]  (Normal) Collected: 03/20/21 2106    Specimen: Swab from Nasopharynx Updated: 03/21/21 0423     COVID19 Not Detected    Narrative:      Fact sheet for providers: https://www.fda.gov/media/702917/download     Fact sheet for patients: https://www.fda.gov/media/370817/download    Basic Metabolic Panel [[id number]]  (Abnormal) Collected: 03/21/21 0131    Specimen: Blood Updated: 03/21/21 0240     Glucose 89 mg/dL      BUN 27 mg/dL       Creatinine 1.33 mg/dL      Sodium 139 mmol/L      Potassium 4.2 mmol/L      Comment: Slight hemolysis detected by analyzer. Results may be affected.        Chloride 105 mmol/L      CO2 26.0 mmol/L      Calcium 9.2 mg/dL      eGFR Non African Amer 54 mL/min/1.73      BUN/Creatinine Ratio 20.3     Anion Gap 8.0 mmol/L     Narrative:      GFR Normal >60  Chronic Kidney Disease <60  Kidney Failure <15      Lipid Panel [[id number]]  (Abnormal) Collected: 03/21/21 0131    Specimen: Blood Updated: 03/21/21 0240     Total Cholesterol 99 mg/dL      Triglycerides 213 mg/dL      HDL Cholesterol 35 mg/dL      LDL Cholesterol  31 mg/dL      VLDL Cholesterol 33 mg/dL      LDL/HDL Ratio 0.61    Narrative:      Cholesterol Reference Ranges  (U.S. Department of Health and Human Services ATP III Classifications)    Desirable          <200 mg/dL  Borderline High    200-239 mg/dL  High Risk          >240 mg/dL      Triglyceride Reference Ranges  (U.S. Department of Health and Human Services ATP III Classifications)    Normal           <150 mg/dL  Borderline High  150-199 mg/dL  High             200-499 mg/dL  Very High        >500 mg/dL    HDL Reference Ranges  (U.S. Department of Health and Human Services ATP III Classifcations)    Low     <40 mg/dl (major risk factor for CHD)  High    >60 mg/dl ('negative' risk factor for CHD)        LDL Reference Ranges  (U.S. Department of Health and Human Services ATP III Classifcations)    Optimal          <100 mg/dL  Near Optimal     100-129 mg/dL  Borderline High  130-159 mg/dL  High             160-189 mg/dL  Very High        >189 mg/dL    Troponin [[id number]]  (Normal) Collected: 03/21/21 0131    Specimen: Blood Updated: 03/21/21 0240     Troponin T <0.010 ng/mL     Narrative:      Troponin T Reference Range:  <= 0.03 ng/mL-   Negative for AMI  >0.03 ng/mL-     Abnormal for myocardial necrosis.  Clinicians would have to utilize clinical acumen, EKG, Troponin and serial changes to determine if  it is an Acute Myocardial Infarction or myocardial injury due to an underlying chronic condition.       Results may be falsely decreased if patient taking Biotin.      Magnesium [[id number]]  (Normal) Collected: 03/21/21 0131    Specimen: Blood Updated: 03/21/21 0240     Magnesium 2.0 mg/dL     CBC & Differential [[id number]]  (Normal) Collected: 03/21/21 0131    Specimen: Blood Updated: 03/21/21 0210    Narrative:      The following orders were created for panel order CBC & Differential.  Procedure                               Abnormality         Status                     ---------                               -----------         ------                     CBC Auto Differential[[id number]]        Normal              Final result                 Please view results for these tests on the individual orders.    CBC Auto Differential [[id number]]  (Normal) Collected: 03/21/21 0131    Specimen: Blood Updated: 03/21/21 0210     WBC 9.00 10*3/mm3      RBC 4.45 10*6/mm3      Hemoglobin 13.8 g/dL      Hematocrit 40.8 %      MCV 91.6 fL      MCH 31.1 pg      MCHC 33.9 g/dL      RDW 13.6 %      RDW-SD 43.8 fl      MPV 9.0 fL      Platelets 221 10*3/mm3      Neutrophil % 61.7 %      Lymphocyte % 28.0 %      Monocyte % 7.3 %      Eosinophil % 2.1 %      Basophil % 0.9 %      Neutrophils, Absolute 5.60 10*3/mm3      Lymphocytes, Absolute 2.50 10*3/mm3      Monocytes, Absolute 0.70 10*3/mm3      Eosinophils, Absolute 0.20 10*3/mm3      Basophils, Absolute 0.10 10*3/mm3      nRBC 0.1 /100 WBC     Hemoglobin A1c [[id number]] Collected: 03/21/21 0131    Specimen: Blood Updated: 03/21/21 0207    Basic Metabolic Panel [[id number]]  (Abnormal) Collected: 03/20/21 1939    Specimen: Blood from Arm, Right Updated: 03/20/21 2012     Glucose 141 mg/dL      BUN 28 mg/dL      Creatinine 1.43 mg/dL      Sodium 140 mmol/L      Potassium 3.8 mmol/L      Chloride 103 mmol/L      CO2 27.0 mmol/L      Calcium 9.7 mg/dL      eGFR Non  Amer 49  mL/min/1.73      BUN/Creatinine Ratio 19.6     Anion Gap 10.0 mmol/L     Narrative:      GFR Normal >60  Chronic Kidney Disease <60  Kidney Failure <15      Troponin [[id number]]  (Normal) Collected: 03/20/21 1939    Specimen: Blood from Arm, Right Updated: 03/20/21 2012     Troponin T <0.010 ng/mL     Narrative:      Troponin T Reference Range:  <= 0.03 ng/mL-   Negative for AMI  >0.03 ng/mL-     Abnormal for myocardial necrosis.  Clinicians would have to utilize clinical acumen, EKG, Troponin and serial changes to determine if it is an Acute Myocardial Infarction or myocardial injury due to an underlying chronic condition.       Results may be falsely decreased if patient taking Biotin.      CBC & Differential [[id number]]  (Abnormal) Collected: 03/20/21 1939    Specimen: Blood from Arm, Right Updated: 03/20/21 1946    Narrative:      The following orders were created for panel order CBC & Differential.  Procedure                               Abnormality         Status                     ---------                               -----------         ------                     CBC Auto Differential[[id number]]        Abnormal            Final result                 Please view results for these tests on the individual orders.    CBC Auto Differential [[id number]]  (Abnormal) Collected: 03/20/21 1939    Specimen: Blood from Arm, Right Updated: 03/20/21 1946     WBC 10.50 10*3/mm3      RBC 4.61 10*6/mm3      Hemoglobin 14.5 g/dL      Hematocrit 42.4 %      MCV 92.0 fL      MCH 31.4 pg      MCHC 34.2 g/dL      RDW 13.2 %      RDW-SD 41.6 fl      MPV 9.1 fL      Platelets 228 10*3/mm3      Neutrophil % 76.6 %      Lymphocyte % 15.1 %      Monocyte % 6.4 %      Eosinophil % 1.2 %      Basophil % 0.7 %      Neutrophils, Absolute 8.10 10*3/mm3      Lymphocytes, Absolute 1.60 10*3/mm3      Monocytes, Absolute 0.70 10*3/mm3      Eosinophils, Absolute 0.10 10*3/mm3      Basophils, Absolute 0.10 10*3/mm3      nRBC 0.0 /100 WBC          No results found for: HGBA1C            No results found for: LIPASE  Lab Results   Component Value Date    CHOL 99 03/21/2021    CHLPL 118 07/15/2019    TRIG 213 (H) 03/21/2021    HDL 35 (L) 03/21/2021    LDL 31 03/21/2021       No results found for: INTRAOP, PREDX, FINALDX, COMDX    Microbiology Results (last 10 days)     Procedure Component Value - Date/Time    COVID-19,CEPHEID,COR/ELDA/PAD IN-HOUSE(OR EMERGENT/ADD-ON),NP SWAB IN TRANSPORT MEDIA 3-4 HR TAT, RT-PCR - Swab, Nasopharynx [[id number]]  (Normal) Collected: 03/20/21 2106    Lab Status: Final result Specimen: Swab from Nasopharynx Updated: 03/21/21 0423     COVID19 Not Detected    Narrative:      Fact sheet for providers: https://www.fda.gov/media/337025/download     Fact sheet for patients: https://www.fda.gov/media/014297/download          ECG/EMG Results (most recent)     Procedure Component Value Units Date/Time    ECG 12 Lead [[id number]] Collected: 03/20/21 1915     Updated: 03/20/21 1918     QT Interval 339 ms     Narrative:      HEART RATE= 99  bpm  RR Interval= 608  ms  [place] Interval= 172  ms  P Horizontal Axis= -8  deg  P Front Axis= 57  deg  QRSD Interval= 92  ms  QT Interval= 339  ms  QRS Axis= 20  deg  T Wave Axis= 97  deg  - ABNORMAL ECG -  Sinus rhythm  Probable left atrial enlargement  Nonspecific T abnormalities, lateral leads  Electronically Signed By:   Date and Time of Study: 2021-03-20 19:15:57                  XR Chest 1 View    Result Date: 3/20/2021    1.  Borderline heart size.  No acute cardiopulmonary disease.   Electronically Signed By-Jose Jane MD On:3/20/2021 7:13 PM This report was finalized on [id number] by  Jose Jane MD.          Xrays, labs reviewed personally by physician.    Medication Review:   I have reviewed the patient's current medication list      Scheduled Meds  amLODIPine, 10 mg, Oral, Daily  aspirin, 81 mg, Oral, Daily  atorvastatin, 20 mg, Oral, Daily  enoxaparin, 40 mg, Subcutaneous,  Q24H  insulin lispro, 0-9 Units, Subcutaneous, TID AC  losartan, 50 mg, Oral, Daily  nicotine, 1 patch, Transdermal, Q24H  nitroglycerin, 1 inch, Topical, Once  sodium chloride, 10 mL, Intravenous, Q12H        Meds Infusions       Meds PRN  •  acetaminophen **OR** acetaminophen **OR** acetaminophen  •  Calcium Gluconate-NaCl **AND** calcium gluconate **AND** Calcium, Ionized  •  dextrose  •  dextrose  •  glucagon (human recombinant)  •  insulin lispro **AND** insulin lispro  •  magnesium sulfate **OR** magnesium sulfate **OR** magnesium sulfate  •  melatonin  •  nitroglycerin  •  ondansetron **OR** ondansetron  •  potassium & sodium phosphates **OR** potassium & sodium phosphates  •  potassium chloride  •  potassium chloride  •  sodium chloride        Assessment/Plan   Assessment/Plan     Active Hospital Problems    Diagnosis  POA   • **Precordial pain [R07.2]  Yes   • Essential hypertension [I10]  Yes   • Diabetes mellitus (CMS/[facility]) [E11.9]  Yes   • Hyperlipidemia [E78.5]  Yes   • Tobacco abuse [Z72.0]  Yes   • CAD (coronary artery disease) [I25.10]  Yes   • LIZETTE (obstructive sleep apnea) [G47.33]  Yes   • Obesity (BMI 30-39.9) [E66.9]  Yes   • CKD (chronic kidney disease) stage 3, GFR 30-59 ml/min (CMS/[facility]) [N18.30]  Yes      Resolved Hospital Problems   No resolved problems to display.       MEDICAL DECISION MAKING COMPLEXITY BY PROBLEM:       Chest pain:  -Acute MI ruled out with cardiac enzymes  -Chest x-ray ruled out cardiopulmonary pathology  -Stress test ordered 3/21/2021  -Continue aspirin and nitroglycerin PRN  -Check lipid panel and A1c        CAD s/p CABG  -On Lipitor at home.     Hypertension:  -On amlodipine at home       Diabetes mellitus  -Hold Metformin during hospitalization  -Check A1c  -Continue ISS     CKD III  - Nephrotoxin holds     Hyperlipidemia  -Check lipid panel  -Continue statin     Obesity (BMI: 37.13)  -Encourage diet lifestyle modifications     Tobacco abuse  -Counseled tobacco  cessation             VTE Prophylaxis -   Mechanical Order History:     None      Pharmalogical Order History:      Ordered     Dose Route Frequency Stop    03/20/21 2128  enoxaparin (LOVENOX) syringe 40 mg      40 mg SC Every 24 Hours Scheduled --                  Code Status -   Code Status and Medical Interventions:   Ordered at: 03/20/21 2049     Code Status:    CPR     Medical Interventions (Level of Support Prior to Arrest):    Full       This patient has been examined wearing appropriate Personal Protective Equipment and discussed with nursing. 03/21/21        Discharge Planning  defer        Electronically signed by Arsen Quesada DO, 03/21/21, 09:54 EDT.  [facility] Hospitalist Team

## 2021-03-21 NOTE — CONSULTS
[facility]    Desiree Almaguer APRN    CHIEF COMPLAINT: Chest pain    HISTORY OF PRESENT ILLNESS:    65-year-old male patient with history of CAD status post coronary artery bypass grafting in the past 15 years ago with coronary artery risk factor significant for atherogenic dyslipidemia, current tobacco use, hypertension and diabetes mellitus type 2.  He also has a past medical history significant for CKD's stage III.  Yesterday he is presented to the emergency room with chest pain.  He underwent stress testing which I personally reviewed the images from which showed inducible inferior wall ischemia.  Also reviewed his chest x-ray which showed no active cardiopulmonary process.  His chest pain is associated with mild shortness of breath but no diaphoresis or nausea.    I also reviewed his electrocardiogram from yesterday which showed mild but nondiagnostic ST segment elevation of less than 0.5 mm with reciprocal changes in the limb leads again nondiagnostic.  However interpreted in the setting of the above it is likely this patient has a right coronary artery lesion versus a dominant left circumflex lesion.      No past medical history on file.  No past surgical history on file.  No family history on file.  Social History     Tobacco Use   • Smoking status: Never Smoker   • Smokeless tobacco: Never Used   Substance Use Topics   • Alcohol use: Never   • Drug use: Never     Medications Prior to Admission   Medication Sig Dispense Refill Last Dose   • amLODIPine (NORVASC) 10 MG tablet Take 10 mg by mouth Daily.      • atorvastatin (LIPITOR) 20 MG tablet Take 20 mg by mouth Daily.      • losartan (COZAAR) 50 MG tablet Take 50 mg by mouth Daily.      • metFORMIN (GLUCOPHAGE) 500 MG tablet Take 500 mg by mouth 2 (Two) Times a Day With Meals.        Allergies:  Patient has no known allergies.      Review of Symptoms:  Constitutional: Patient afebrile no chills or unexpected weight  "changes  Respiratory: No cough, no wheezing or dyspnea  Cardiovascular: Today the patient complains of no chest pain, palpitations, dyspnea, orthopnea and no edema  Gastrointestinal: No nausea, vomiting, constipation or diarrhea.  No melena or dark stools    All other systems reviewed and are negative          Vital Signs  Temp:  [97.3 °F (36.3 °C)-98 °F (36.7 °C)] 98 °F (36.7 °C)  Heart Rate:  [] 87  Resp:  [16-18] 18  BP: (121-187)/(59-97) 129/72  Oxygen Therapy  SpO2: 94 %  Pulse Oximetry Type: Intermittent  Device (Oxygen Therapy): room air}  Body mass index is 38.65 kg/m².  Flowsheet Rows      First Filed Value   Admission Height  181.6 cm (71.5\") Documented at 03/20/2021 1824   Admission Weight  122 kg (270 lb) Documented at 03/20/2021 1824             Physical exam  Constitutional: well-nourished, and appears stated age in no acute distress  PERRL: Conjunctiva clear, no pallor, anicteric  HENMT: normocephalic, normal dentition, no cyanosis or pallor  Neck:no bruits, or thrills and bilateral normal carotid upstroke. Normal jugular venous pressure  Cardiovascular: No parasternal heaves an non-displaced focal PMI. Normal rate and rhythm: no rub, gallop, murmur or click and normal S1 and S2; no lower or upper extremity edema.   Lungs: unlabored, no wheezing with no rales or rhonchi on auscultation.  Extremities: Warm, no clubbing, cyanosis. Full and equal peripheral pulses in extremities with no bruits appreciated.   Abdomen: soft, non-tender, non-distended  Musculoskeletal: no joint tenderness or swelling and no erythema  Skin: Warm and dry, non-erythematous   Neuro:alert and normal affect. Oriented to time, place and person.          Results Review:    I reviewed the patient's new clinical results.  Lab Results (most recent)     Procedure Component Value Units Date/Time    POC Glucose Once [[id number]]  (Normal) Collected: 03/21/21 0704    Specimen: Blood Updated: 03/21/21 0705     Glucose 97 mg/dL      " Comment: Serial Number: [id number]:  [id number]       COVID-19,CEPHEID,COR/ELDA/PAD IN-HOUSE(OR EMERGENT/ADD-ON),NP SWAB IN TRANSPORT MEDIA 3-4 HR TAT, RT-PCR - Swab, Nasopharynx [[id number]]  (Normal) Collected: 03/20/21 2106    Specimen: Swab from Nasopharynx Updated: 03/21/21 0423     COVID19 Not Detected    Narrative:      Fact sheet for providers: https://www.fda.gov/media/344239/download     Fact sheet for patients: https://www.fda.gov/media/032187/download    Basic Metabolic Panel [[id number]]  (Abnormal) Collected: 03/21/21 0131    Specimen: Blood Updated: 03/21/21 0240     Glucose 89 mg/dL      BUN 27 mg/dL      Creatinine 1.33 mg/dL      Sodium 139 mmol/L      Potassium 4.2 mmol/L      Comment: Slight hemolysis detected by analyzer. Results may be affected.        Chloride 105 mmol/L      CO2 26.0 mmol/L      Calcium 9.2 mg/dL      eGFR Non African Amer 54 mL/min/1.73      BUN/Creatinine Ratio 20.3     Anion Gap 8.0 mmol/L     Narrative:      GFR Normal >60  Chronic Kidney Disease <60  Kidney Failure <15      Lipid Panel [[id number]]  (Abnormal) Collected: 03/21/21 0131    Specimen: Blood Updated: 03/21/21 0240     Total Cholesterol 99 mg/dL      Triglycerides 213 mg/dL      HDL Cholesterol 35 mg/dL      LDL Cholesterol  31 mg/dL      VLDL Cholesterol 33 mg/dL      LDL/HDL Ratio 0.61    Narrative:      Cholesterol Reference Ranges  (U.S. Department of Health and Human Services ATP III Classifications)    Desirable          <200 mg/dL  Borderline High    200-239 mg/dL  High Risk          >240 mg/dL      Triglyceride Reference Ranges  (U.S. Department of Health and Human Services ATP III Classifications)    Normal           <150 mg/dL  Borderline High  150-199 mg/dL  High             200-499 mg/dL  Very High        >500 mg/dL    HDL Reference Ranges  (U.S. Department of Health and Human Services ATP III Classifcations)    Low     <40 mg/dl (major risk factor for CHD)  High    >60 mg/dl ('negative' risk  factor for CHD)        LDL Reference Ranges  (U.S. Department of Health and Human Services ATP III Classifcations)    Optimal          <100 mg/dL  Near Optimal     100-129 mg/dL  Borderline High  130-159 mg/dL  High             160-189 mg/dL  Very High        >189 mg/dL    Troponin [[id number]]  (Normal) Collected: 03/21/21 0131    Specimen: Blood Updated: 03/21/21 0240     Troponin T <0.010 ng/mL     Narrative:      Troponin T Reference Range:  <= 0.03 ng/mL-   Negative for AMI  >0.03 ng/mL-     Abnormal for myocardial necrosis.  Clinicians would have to utilize clinical acumen, EKG, Troponin and serial changes to determine if it is an Acute Myocardial Infarction or myocardial injury due to an underlying chronic condition.       Results may be falsely decreased if patient taking Biotin.      Magnesium [[id number]]  (Normal) Collected: 03/21/21 0131    Specimen: Blood Updated: 03/21/21 0240     Magnesium 2.0 mg/dL     CBC & Differential [[id number]]  (Normal) Collected: 03/21/21 0131    Specimen: Blood Updated: 03/21/21 0210    Narrative:      The following orders were created for panel order CBC & Differential.  Procedure                               Abnormality         Status                     ---------                               -----------         ------                     CBC Auto Differential[[id number]]        Normal              Final result                 Please view results for these tests on the individual orders.    CBC Auto Differential [[id number]]  (Normal) Collected: 03/21/21 0131    Specimen: Blood Updated: 03/21/21 0210     WBC 9.00 10*3/mm3      RBC 4.45 10*6/mm3      Hemoglobin 13.8 g/dL      Hematocrit 40.8 %      MCV 91.6 fL      MCH 31.1 pg      MCHC 33.9 g/dL      RDW 13.6 %      RDW-SD 43.8 fl      MPV 9.0 fL      Platelets 221 10*3/mm3      Neutrophil % 61.7 %      Lymphocyte % 28.0 %      Monocyte % 7.3 %      Eosinophil % 2.1 %      Basophil % 0.9 %      Neutrophils, Absolute 5.60  10*3/mm3      Lymphocytes, Absolute 2.50 10*3/mm3      Monocytes, Absolute 0.70 10*3/mm3      Eosinophils, Absolute 0.20 10*3/mm3      Basophils, Absolute 0.10 10*3/mm3      nRBC 0.1 /100 WBC     Hemoglobin A1c [[id number]] Collected: 03/21/21 0131    Specimen: Blood Updated: 03/21/21 0207    Basic Metabolic Panel [[id number]]  (Abnormal) Collected: 03/20/21 1939    Specimen: Blood from Arm, Right Updated: 03/20/21 2012     Glucose 141 mg/dL      BUN 28 mg/dL      Creatinine 1.43 mg/dL      Sodium 140 mmol/L      Potassium 3.8 mmol/L      Chloride 103 mmol/L      CO2 27.0 mmol/L      Calcium 9.7 mg/dL      eGFR Non African Amer 49 mL/min/1.73      BUN/Creatinine Ratio 19.6     Anion Gap 10.0 mmol/L     Narrative:      GFR Normal >60  Chronic Kidney Disease <60  Kidney Failure <15      Troponin [[id number]]  (Normal) Collected: 03/20/21 1939    Specimen: Blood from Arm, Right Updated: 03/20/21 2012     Troponin T <0.010 ng/mL     Narrative:      Troponin T Reference Range:  <= 0.03 ng/mL-   Negative for AMI  >0.03 ng/mL-     Abnormal for myocardial necrosis.  Clinicians would have to utilize clinical acumen, EKG, Troponin and serial changes to determine if it is an Acute Myocardial Infarction or myocardial injury due to an underlying chronic condition.       Results may be falsely decreased if patient taking Biotin.      CBC & Differential [[id number]]  (Abnormal) Collected: 03/20/21 1939    Specimen: Blood from Arm, Right Updated: 03/20/21 1946    Narrative:      The following orders were created for panel order CBC & Differential.  Procedure                               Abnormality         Status                     ---------                               -----------         ------                     CBC Auto Differential[[id number]]        Abnormal            Final result                 Please view results for these tests on the individual orders.    CBC Auto Differential [[id number]]  (Abnormal) Collected: 03/20/21  1939    Specimen: Blood from Arm, Right Updated: 03/20/21 1946     WBC 10.50 10*3/mm3      RBC 4.61 10*6/mm3      Hemoglobin 14.5 g/dL      Hematocrit 42.4 %      MCV 92.0 fL      MCH 31.4 pg      MCHC 34.2 g/dL      RDW 13.2 %      RDW-SD 41.6 fl      MPV 9.1 fL      Platelets 228 10*3/mm3      Neutrophil % 76.6 %      Lymphocyte % 15.1 %      Monocyte % 6.4 %      Eosinophil % 1.2 %      Basophil % 0.7 %      Neutrophils, Absolute 8.10 10*3/mm3      Lymphocytes, Absolute 1.60 10*3/mm3      Monocytes, Absolute 0.70 10*3/mm3      Eosinophils, Absolute 0.10 10*3/mm3      Basophils, Absolute 0.10 10*3/mm3      nRBC 0.0 /100 WBC           Imaging Results (Most Recent)     Procedure Component Value Units Date/Time    XR Chest 1 View [[id number]] Collected: 03/20/21 1911     Updated: 03/20/21 1915    Narrative:      XR CHEST 1 VW-     Date of Exam: 3/20/2021 6:39 PM     Indication: pain.     Comparison: None available.     Technique: A single view of the chest was obtained.     FINDINGS:      Patient status post median sternotomy.  Heart size is at the upper  limits of normal.  Pulmonary vessels are normal.  Lungs are clear.   There are no pleural effusions.  Patient is rotated to the left on  today's exam.             Impression:            1.  Borderline heart size.  No acute cardiopulmonary disease.        Electronically Signed By-Jose Jane MD On:3/20/2021 7:13 PM  This report was finalized on [id number] by  Jose Jane MD.        reviewed    ECG/EMG Results (most recent)     Procedure Component Value Units Date/Time    ECG 12 Lead [[id number]] Collected: 03/20/21 1915     Updated: 03/20/21 1918     QT Interval 339 ms     Narrative:      HEART RATE= 99  bpm  RR Interval= 608  ms  [place] Interval= 172  ms  P Horizontal Axis= -8  deg  P Front Axis= 57  deg  QRSD Interval= 92  ms  QT Interval= 339  ms  QRS Axis= 20  deg  T Wave Axis= 97  deg  - ABNORMAL ECG -  Sinus rhythm  Probable left atrial  enlargement  Nonspecific T abnormalities, lateral leads  Electronically Signed By:   Date and Time of Study: 2021-03-20 19:15:57        reviewed    Assessment/Plan     Acute coronary syndrome  Atherogenic dyslipidemia  CAD  Unstable angina    Given the above, in the setting of the positive stress test, the patient should undergo cardiac catheterization at this time.    Greater than 30 minutes total of face-to-face/floor  time was spent with the patient and family and nursing coordinating plan and patient management.  Of which counseling of patient with regard specifically to acute coronary syndrome comprised 50% of this total time.        Jourdan Pillai MD  03/21/21  12:09 EDT

## 2021-03-21 NOTE — NURSING NOTE
Pt arrived on the floor at 1630 with no acute distress. Angioseal site has no hematoma, discoloration, or tenderness.

## 2021-03-22 VITALS
RESPIRATION RATE: 18 BRPM | WEIGHT: 275.8 LBS | HEART RATE: 85 BPM | HEIGHT: 71 IN | BODY MASS INDEX: 38.61 KG/M2 | SYSTOLIC BLOOD PRESSURE: 150 MMHG | TEMPERATURE: 97.8 F | OXYGEN SATURATION: 97 % | DIASTOLIC BLOOD PRESSURE: 79 MMHG

## 2021-03-22 PROBLEM — R07.2 PRECORDIAL PAIN: Status: RESOLVED | Noted: 2021-03-20 | Resolved: 2021-03-22

## 2021-03-22 PROBLEM — I20.0 UNSTABLE ANGINA (HCC): Status: RESOLVED | Noted: 2021-03-20 | Resolved: 2021-03-22

## 2021-03-22 LAB
ANION GAP SERPL CALCULATED.3IONS-SCNC: 12 MMOL/L (ref 5–15)
BASOPHILS # BLD AUTO: 0.1 10*3/MM3 (ref 0–0.2)
BASOPHILS NFR BLD AUTO: 0.4 % (ref 0–1.5)
BUN SERPL-MCNC: 21 MG/DL (ref 8–23)
BUN/CREAT SERPL: 17.4 (ref 7–25)
CALCIUM SPEC-SCNC: 9.4 MG/DL (ref 8.6–10.5)
CHLORIDE SERPL-SCNC: 102 MMOL/L (ref 98–107)
CO2 SERPL-SCNC: 24 MMOL/L (ref 22–29)
CREAT SERPL-MCNC: 1.21 MG/DL (ref 0.76–1.27)
DEPRECATED RDW RBC AUTO: 44.6 FL (ref 37–54)
EOSINOPHIL # BLD AUTO: 0.2 10*3/MM3 (ref 0–0.4)
EOSINOPHIL NFR BLD AUTO: 1.3 % (ref 0.3–6.2)
ERYTHROCYTE [DISTWIDTH] IN BLOOD BY AUTOMATED COUNT: 13.8 % (ref 12.3–15.4)
GFR SERPL CREATININE-BSD FRML MDRD: 60 ML/MIN/1.73
GLUCOSE BLDC GLUCOMTR-MCNC: 110 MG/DL (ref 70–105)
GLUCOSE BLDC GLUCOMTR-MCNC: 174 MG/DL (ref 70–105)
GLUCOSE SERPL-MCNC: 112 MG/DL (ref 65–99)
HBA1C MFR BLD: 6.2 % (ref 3.5–5.6)
HCT VFR BLD AUTO: 42.5 % (ref 37.5–51)
HGB BLD-MCNC: 14.3 G/DL (ref 13–17.7)
LYMPHOCYTES # BLD AUTO: 1.4 10*3/MM3 (ref 0.7–3.1)
LYMPHOCYTES NFR BLD AUTO: 10.5 % (ref 19.6–45.3)
MAGNESIUM SERPL-MCNC: 2.2 MG/DL (ref 1.6–2.4)
MCH RBC QN AUTO: 30.8 PG (ref 26.6–33)
MCHC RBC AUTO-ENTMCNC: 33.6 G/DL (ref 31.5–35.7)
MCV RBC AUTO: 91.5 FL (ref 79–97)
MONOCYTES # BLD AUTO: 0.8 10*3/MM3 (ref 0.1–0.9)
MONOCYTES NFR BLD AUTO: 6.1 % (ref 5–12)
NEUTROPHILS NFR BLD AUTO: 10.8 10*3/MM3 (ref 1.7–7)
NEUTROPHILS NFR BLD AUTO: 81.7 % (ref 42.7–76)
NRBC BLD AUTO-RTO: 0.3 /100 WBC (ref 0–0.2)
PLATELET # BLD AUTO: 218 10*3/MM3 (ref 140–450)
PMV BLD AUTO: 9 FL (ref 6–12)
POTASSIUM SERPL-SCNC: 4 MMOL/L (ref 3.5–5.2)
RBC # BLD AUTO: 4.64 10*6/MM3 (ref 4.14–5.8)
SODIUM SERPL-SCNC: 138 MMOL/L (ref 136–145)
WBC # BLD AUTO: 13.2 10*3/MM3 (ref 3.4–10.8)

## 2021-03-22 PROCEDURE — 85025 COMPLETE CBC W/AUTO DIFF WBC: CPT | Performed by: INTERNAL MEDICINE

## 2021-03-22 PROCEDURE — 63710000001 INSULIN LISPRO (HUMAN) PER 5 UNITS: Performed by: INTERNAL MEDICINE

## 2021-03-22 PROCEDURE — 93005 ELECTROCARDIOGRAM TRACING: CPT | Performed by: INTERNAL MEDICINE

## 2021-03-22 PROCEDURE — 80048 BASIC METABOLIC PNL TOTAL CA: CPT | Performed by: INTERNAL MEDICINE

## 2021-03-22 PROCEDURE — 99233 SBSQ HOSP IP/OBS HIGH 50: CPT | Performed by: INTERNAL MEDICINE

## 2021-03-22 PROCEDURE — 99238 HOSP IP/OBS DSCHRG MGMT 30/<: CPT | Performed by: HOSPITALIST

## 2021-03-22 PROCEDURE — 83735 ASSAY OF MAGNESIUM: CPT | Performed by: INTERNAL MEDICINE

## 2021-03-22 PROCEDURE — 82962 GLUCOSE BLOOD TEST: CPT

## 2021-03-22 RX ORDER — ASPIRIN 81 MG/1
81 TABLET ORAL DAILY
Qty: 30 TABLET | Refills: 0 | Status: ON HOLD | OUTPATIENT
Start: 2021-03-23 | End: 2021-12-24

## 2021-03-22 RX ADMIN — ASPIRIN 81 MG: 81 TABLET, COATED ORAL at 09:25

## 2021-03-22 RX ADMIN — Medication 10 ML: at 09:26

## 2021-03-22 RX ADMIN — ATORVASTATIN CALCIUM 20 MG: 20 TABLET, FILM COATED ORAL at 09:25

## 2021-03-22 RX ADMIN — AMLODIPINE BESYLATE 10 MG: 5 TABLET ORAL at 09:25

## 2021-03-22 RX ADMIN — TICAGRELOR 90 MG: 90 TABLET ORAL at 09:25

## 2021-03-22 RX ADMIN — INSULIN LISPRO 2 UNITS: 100 INJECTION, SOLUTION INTRAVENOUS; SUBCUTANEOUS at 13:32

## 2021-03-22 RX ADMIN — LOSARTAN POTASSIUM 50 MG: 50 TABLET, FILM COATED ORAL at 09:25

## 2021-03-22 NOTE — PROGRESS NOTES
Cardiology progress note  Dev Lawton MD, PhD    Patient Care Team:  Desiree Almaguer APRN as PCP - General    ADMITTING DIAGNOSES: Non-ST elevation myocardial infarction    SUBJECTIVE: Seen and examined status post PCI SVG to RCA.  Doing well overnight.  No home at hematoma, no chest pain.  Stable for discharge today with outpatient follow-up with Dr. Pillai in 2 weeks    2 platelet therapy discussed at length today    Greater than 35 minutes on the encounter answering a variety of questions as well as in med rec, coordination of care, additional time and charting    REVIEW OF SYSTEMS: Some 14-point review of systems is negative except what is mentioned in the HPI relative to the current complaint.     PAST MEDICAL HISTORY:   No past medical history on file.    ALLERGIES:   No Known Allergies      PAST SURGICAL HISTORY:   No past surgical history on file.       FAMILY HISTORY:   No family history on file.      SOCIAL HISTORY:   Social History     Socioeconomic History   • Marital status:      Spouse name: Not on file   • Number of children: Not on file   • Years of education: Not on file   • Highest education level: Not on file   Tobacco Use   • Smoking status: Never Smoker   • Smokeless tobacco: Never Used   Substance and Sexual Activity   • Alcohol use: Never   • Drug use: Never       CURRENT MEDICATIONS:     Current Facility-Administered Medications:   •  acetaminophen (TYLENOL) tablet 650 mg, 650 mg, Oral, Q4H PRN **OR** acetaminophen (TYLENOL) 160 MG/5ML solution 650 mg, 650 mg, Oral, Q4H PRN **OR** acetaminophen (TYLENOL) suppository 650 mg, 650 mg, Rectal, Q4H PRN, Jourdan Pillai MD  •  amLODIPine (NORVASC) tablet 10 mg, 10 mg, Oral, Daily, Jourdan Pillai MD, 10 mg at 03/22/21 0925  •  aspirin EC tablet 81 mg, 81 mg, Oral, Daily, Jourdan Pillai MD, 81 mg at 03/22/21 0925  •  atorvastatin (LIPITOR) tablet 20 mg, 20 mg, Oral, Daily, Jourdan Pillai MD,  20 mg at 03/22/21 0925  •  calcium gluconate 1g/50ml 0.675% NaCl IV SOLN, 1 g, Intravenous, PRN **AND** calcium gluconate 2-0.675 GM/100ML NACL IVPB, 2 g, Intravenous, PRN **AND** Calcium, Ionized, , , PRN, Jourdan Pillai MD  •  dextrose (D50W) 25 g/ 50mL Intravenous Solution 25 g, 25 g, Intravenous, Q15 Min PRN, Jourdan Pillai MD  •  dextrose (GLUTOSE) oral gel 15 g, 15 g, Oral, Q15 Min PRN, Jourdan Pillai MD  •  enoxaparin (LOVENOX) syringe 40 mg, 40 mg, Subcutaneous, Q24H, Jourdan Pillai MD, Stopped at 03/21/21 1607  •  glucagon (human recombinant) (GLUCAGEN DIAGNOSTIC) injection 1 mg, 1 mg, Subcutaneous, Q15 Min PRN, Jourdan Pillai MD  •  insulin lispro (ADMELOG) injection 0-9 Units, 0-9 Units, Subcutaneous, TID AC **AND** insulin lispro (ADMELOG) injection 0-9 Units, 0-9 Units, Subcutaneous, PRN, Jourdan Pillai MD  •  losartan (COZAAR) tablet 50 mg, 50 mg, Oral, Daily, Jourdan Pillai MD, 50 mg at 03/22/21 0925  •  Magnesium Sulfate 2 gram Bolus, followed by 8 gram infusion (total Mg dose 10 grams)- Mg less than or equal to 1mg/dL, 2 g, Intravenous, PRN **OR** Magnesium Sulfate 2 gram / 50mL Infusion (GIVE X 3 BAGS TO EQUAL 6GM TOTAL DOSE) - Mg 1.1 - 1.5 mg/dl, 2 g, Intravenous, PRN **OR** Magnesium Sulfate 4 gram infusion- Mg 1.6-1.9 mg/dL, 4 g, Intravenous, PRN, Jourdan Pillai MD  •  melatonin tablet 5 mg, 5 mg, Oral, Nightly PRN, Jourdan Pillai MD  •  nicotine (NICODERM CQ) 21 MG/24HR patch 1 patch, 1 patch, Transdermal, Q24H, Jourdan Pillai MD  •  nitroglycerin (NITROSTAT) ointment 1 inch, 1 inch, Topical, Once, Jourdan Pillai MD  •  nitroglycerin (NITROSTAT) SL tablet 0.4 mg, 0.4 mg, Sublingual, Q5 Min PRN, Jourdan Pillai MD, 0.4 mg at 03/21/21 0920  •  ondansetron (ZOFRAN) tablet 4 mg, 4 mg, Oral, Q6H PRN **OR** ondansetron (ZOFRAN) injection 4 mg, 4 mg,  Intravenous, Q6H PRN, Jourdan Pillai MD  •  potassium & sodium phosphates (PHOS-NAK) 280-160-250 MG packet - for Phosphorus less than 1.25 mg/dL, 2 packet, Oral, Q6H PRN **OR** potassium & sodium phosphates (PHOS-NAK) 280-160-250 MG packet - for Phosphorus 1.25 - 2.5 mg/dL, 2 packet, Oral, Q6H PRN, Jourdan Pillai MD  •  potassium chloride (K-DUR,KLOR-CON) CR tablet 40 mEq, 40 mEq, Oral, PRN, Jourdan Pillai MD  •  potassium chloride (KLOR-CON) packet 40 mEq, 40 mEq, Oral, PRN, Jourdan Pillai MD  •  sodium chloride 0.9 % flush 10 mL, 10 mL, Intravenous, Q12H, Jourdan Pillai MD, 10 mL at 03/22/21 0926  •  sodium chloride 0.9 % flush 10 mL, 10 mL, Intravenous, PRN, Jourdan Pillai MD  •  ticagrelor (BRILINTA) tablet 90 mg, 90 mg, Oral, Q12H, Jourdan Pillai MD, 90 mg at 03/22/21 0925      DIAGNOSTIC DATA:     I reviewed the patient's new clinical results.    Lab Results (last 24 hours)     Procedure Component Value Units Date/Time    POC Glucose Once [[id number]]  (Abnormal) Collected: 03/22/21 1135    Specimen: Blood Updated: 03/22/21 1144     Glucose 174 mg/dL      Comment: Serial Number: [id number]:  [id number]       Hemoglobin A1c [[id number]]  (Abnormal) Collected: 03/21/21 0131    Specimen: Blood Updated: 03/22/21 1121     Hemoglobin A1C 6.2 %     Narrative:      Hemoglobin A1C Reference Range:    <5.7 %        Normal  5.7-6.4 %     Increased risk for diabetes  > 6.4 %        Diabetes       These guidelines have been recommended by the American Diabetic Association for Hgb A1c.      The following 2010 guidelines have been recommended by the American Diabetes Association for Hemoglobin A1c.    HBA1c 5.7-6.4% Increased risk for future diabetes (pre-diabetes)  HBA1c     >6.4% Diabetes      POC Glucose Once [[id number]]  (Abnormal) Collected: 03/22/21 0722    Specimen: Blood Updated: 03/22/21 0723     Glucose 110 mg/dL      Comment:  Serial Number: [id number]:  [id number]       Basic Metabolic Panel [[id number]]  (Abnormal) Collected: 03/22/21 0528    Specimen: Blood Updated: 03/22/21 0635     Glucose 112 mg/dL      BUN 21 mg/dL      Creatinine 1.21 mg/dL      Sodium 138 mmol/L      Potassium 4.0 mmol/L      Chloride 102 mmol/L      CO2 24.0 mmol/L      Calcium 9.4 mg/dL      eGFR Non African Amer 60 mL/min/1.73      BUN/Creatinine Ratio 17.4     Anion Gap 12.0 mmol/L     Narrative:      GFR Normal >60  Chronic Kidney Disease <60  Kidney Failure <15      Magnesium [[id number]]  (Normal) Collected: 03/22/21 0528    Specimen: Blood Updated: 03/22/21 0635     Magnesium 2.2 mg/dL     CBC & Differential [[id number]]  (Abnormal) Collected: 03/22/21 0528    Specimen: Blood Updated: 03/22/21 0621    Narrative:      The following orders were created for panel order CBC & Differential.  Procedure                               Abnormality         Status                     ---------                               -----------         ------                     CBC Auto Differential[[id number]]        Abnormal            Final result                 Please view results for these tests on the individual orders.    CBC Auto Differential [[id number]]  (Abnormal) Collected: 03/22/21 0528    Specimen: Blood Updated: 03/22/21 0621     WBC 13.20 10*3/mm3      RBC 4.64 10*6/mm3      Hemoglobin 14.3 g/dL      Hematocrit 42.5 %      MCV 91.5 fL      MCH 30.8 pg      MCHC 33.6 g/dL      RDW 13.8 %      RDW-SD 44.6 fl      MPV 9.0 fL      Platelets 218 10*3/mm3      Neutrophil % 81.7 %      Lymphocyte % 10.5 %      Monocyte % 6.1 %      Eosinophil % 1.3 %      Basophil % 0.4 %      Neutrophils, Absolute 10.80 10*3/mm3      Lymphocytes, Absolute 1.40 10*3/mm3      Monocytes, Absolute 0.80 10*3/mm3      Eosinophils, Absolute 0.20 10*3/mm3      Basophils, Absolute 0.10 10*3/mm3      nRBC 0.3 /100 WBC     POC Glucose Once [[id number]]  (Abnormal) Collected: 03/21/21 7092     "Specimen: Blood Updated: 03/21/21 2148     Glucose 141 mg/dL      Comment: Serial Number: [id number]:  [id number]             Imaging Results (Last 24 Hours)     ** No results found for the last 24 hours. **          Xray reviewed personally by physician.      ECG reviewed personally by physician  ECG/EMG Results (most recent)     Procedure Component Value Units Date/Time    ECG 12 Lead [[id number]] Collected: 03/20/21 1915     Updated: 03/21/21 1225     QT Interval 339 ms     Narrative:      HEART RATE= 99  bpm  RR Interval= 608  ms  [place] Interval= 172  ms  P Horizontal Axis= -8  deg  P Front Axis= 57  deg  QRSD Interval= 92  ms  QT Interval= 339  ms  QRS Axis= 20  deg  T Wave Axis= 97  deg  - ABNORMAL ECG -  Sinus rhythm  Probable left atrial enlargement  Nonspecific T abnormalities, lateral leads  Electronically Signed By: Alphonso Crandall ([facility]) 21-Mar-2021 12:24:53  Date and Time of Study: 2021-03-20 19:15:57    ECG 12 Lead [[id number]] Collected: 03/22/21 0611     Updated: 03/22/21 0613     QT Interval 354 ms     Narrative:      HEART RATE= 90  bpm  RR Interval= 668  ms  WI Interval= 156  ms  P Horizontal Axis= 2  deg  P Front Axis= 58  deg  QRSD Interval= 93  ms  QT Interval= 354  ms  QRS Axis= 11  deg  T Wave Axis= 70  deg  - NORMAL ECG -  Sinus rhythm  When compared with ECG of 20-Mar-2021 19:15:57,  Significant repolarization change  Significant axis, voltage or hypertrophy change  Electronically Signed By:   Date and Time of Study: 2021-03-22 06:11:59            PHYSICAL EXAMINATION:  VITAL SIGNS: Temp:  [97.1 °F (36.2 °C)-98 °F (36.7 °C)] 97.8 °F (36.6 °C)  Heart Rate:  [] 96  Resp:  [16-20] 20  BP: (127-178)/() 174/87   Flowsheet Rows      First Filed Value   Admission Height  181.6 cm (71.5\") Documented at 03/20/2021 1824   Admission Weight  122 kg (270 lb) Documented at 03/20/2021 1824        GENERAL: Alert and oriented x3, afebrile. Vital signs stable, appeared comfortable, nondistressed, " and appears stated age. Generalized weakness. Responds to questions appropriately.   HEENT: Normocephalic, atraumatic. Pupils equal, round and reactive to light. Extraocular movements intact bilaterally. Trachea midline.  Mucous membranes are moist.   NECK: Supple. No JVD. Trachea midline, no LAD  CHEST: Clear to auscultation bilaterally anteriorly; no rale, rhonchi, or wheezes  HEART: Regular rate and rhythm. No rubs, murmurs or gallops.   ABDOMEN: Soft, nontender, nondistended. Bowel sounds are otherwise positive.   EXTREMITIES: No clubbing, cyanosis, or edema. Moves all extremities  SKIN: Warm and dry. No ecchymoses, petechiae or rashes.   MUSCULOSKELETAL: No bony abnormalities. Range of motion normal. No crepitus. No joint swelling or erythema.   NEUROLOGIC: Cranial nerves II-XII intact bilaterally. No focal neurologic deficits. Mini-Mental status exam was deferred.   LYMPHATICS: No lymphadenopathy.     ASSESSMENT AND PLAN:   Acute coronary syndrome  Atherogenic dyslipidemia  CAD  Unstable angina  Status post PCI SVG to RCA    Antiplatelet therapy with aspirin and Brilinta uninterrupted for 6 months  Continue losartan  Continue statin therapy  No beta-blocker secondary to prior bradycardia  We will reassess as outpatient for candidacy      Okay to discharge today from CV standpoint    Dev Lawton MD, PhD    Arrange follow-up for Dr. Pillai in 2 weeks to 4 weeks    Dev Lawton MD  03/22/21  12:19 EDT      Documented above with respect to time

## 2021-03-22 NOTE — CONSULTS
Pt seen and given Cardiac Rehab brochure and informed of program information, also given CIG, and handouts on HTN, HHD, Stress, Triglycerides, Cholesterol, and Anti platelets.

## 2021-03-22 NOTE — PLAN OF CARE
Problem: Adult Inpatient Plan of Care  Goal: Plan of Care Review  Outcome: Ongoing, Progressing  Goal: Patient-Specific Goal (Individualized)  Outcome: Ongoing, Progressing  Goal: Absence of Hospital-Acquired Illness or Injury  Outcome: Ongoing, Progressing  Intervention: Identify and Manage Fall Risk  Recent Flowsheet Documentation  Taken 3/22/2021 0400 by Michela Liu RN  Safety Promotion/Fall Prevention: safety round/check completed  Taken 3/22/2021 0200 by Michela Liu RN  Safety Promotion/Fall Prevention: safety round/check completed  Taken 3/22/2021 0000 by Michela Liu RN  Safety Promotion/Fall Prevention:   safety round/check completed   nonskid shoes/slippers when out of bed   clutter free environment maintained   lighting adjusted  Taken 3/21/2021 2200 by Michela Liu RN  Safety Promotion/Fall Prevention:   safety round/check completed   nonskid shoes/slippers when out of bed   lighting adjusted   clutter free environment maintained  Taken 3/21/2021 2100 by Michela Liu RN  Safety Promotion/Fall Prevention:   safety round/check completed   nonskid shoes/slippers when out of bed   clutter free environment maintained  Taken 3/21/2021 2000 by Mihcela Liu RN  Safety Promotion/Fall Prevention:   safety round/check completed   nonskid shoes/slippers when out of bed   lighting adjusted   clutter free environment maintained  Taken 3/21/2021 1910 by Michela Liu RN  Safety Promotion/Fall Prevention:   safety round/check completed   nonskid shoes/slippers when out of bed   lighting adjusted   clutter free environment maintained   assistive device/personal items within reach  Intervention: Prevent Skin Injury  Recent Flowsheet Documentation  Taken 3/22/2021 0400 by Michela Liu RN  Body Position: position changed independently  Taken 3/22/2021 0200 by Michela Liu RN  Body Position: position changed independently  Taken 3/22/2021 0000 by Michela Liu RN  Body  Position: position changed independently  Taken 3/21/2021 2200 by Michela Liu RN  Body Position: position changed independently  Taken 3/21/2021 2100 by Michela Liu RN  Body Position: position changed independently  Taken 3/21/2021 2000 by Michela Liu RN  Body Position: position changed independently  Taken 3/21/2021 1910 by Michela Liu RN  Body Position: position changed independently  Intervention: Prevent and Manage VTE (venous thromboembolism) Risk  Recent Flowsheet Documentation  Taken 3/21/2021 1910 by Michela Liu RN  VTE Prevention/Management: dorsiflexion/plantar flexion performed  Intervention: Prevent Infection  Recent Flowsheet Documentation  Taken 3/22/2021 0400 by Michela Liu RN  Infection Prevention:   single patient room provided   rest/sleep promoted   hand hygiene promoted   equipment surfaces disinfected   environmental surveillance performed  Taken 3/22/2021 0200 by Michela Liu RN  Infection Prevention:   rest/sleep promoted   hand hygiene promoted   equipment surfaces disinfected   environmental surveillance performed   single patient room provided  Taken 3/22/2021 0000 by Michela Liu RN  Infection Prevention:   rest/sleep promoted   hand hygiene promoted   equipment surfaces disinfected   environmental surveillance performed   personal protective equipment utilized   single patient room provided   visitors restricted/screened  Taken 3/21/2021 2200 by Michela Liu RN  Infection Prevention:   rest/sleep promoted   environmental surveillance performed   equipment surfaces disinfected   hand hygiene promoted   personal protective equipment utilized   single patient room provided   visitors restricted/screened  Taken 3/21/2021 2100 by Michela Liu RN  Infection Prevention:   environmental surveillance performed   equipment surfaces disinfected   hand hygiene promoted   personal protective equipment utilized   rest/sleep promoted   single  patient room provided   visitors restricted/screened  Taken 3/21/2021 2000 by Michela Liu RN  Infection Prevention:   rest/sleep promoted   single patient room provided   personal protective equipment utilized   hand hygiene promoted   equipment surfaces disinfected   visitors restricted/screened   environmental surveillance performed  Taken 3/21/2021 1910 by Michela Liu RN  Infection Prevention:   single patient room provided   rest/sleep promoted   personal protective equipment utilized   hand hygiene promoted   equipment surfaces disinfected   environmental surveillance performed  Goal: Optimal Comfort and Wellbeing  Outcome: Ongoing, Progressing  Intervention: Provide Person-Centered Care  Recent Flowsheet Documentation  Taken 3/21/2021 1910 by Michela Liu RN  Trust Relationship/Rapport:   care explained   choices provided   questions encouraged   reassurance provided  Goal: Readiness for Transition of Care  Outcome: Ongoing, Progressing     Problem: Chest Pain  Goal: Resolution of Chest Pain Symptoms  Outcome: Ongoing, Progressing  Intervention: Manage Acute Chest Pain  Recent Flowsheet Documentation  Taken 3/21/2021 1910 by Michela Liu RN  Chest Pain Intervention: (n/a) --   Goal Outcome Evaluation:

## 2021-03-22 NOTE — PROGRESS NOTES
"      [facility] Medicine Services Daily Progress Note      Hospitalist Team  LOS 0 days      Patient Care Team:  Desiree Almaguer APRN as PCP - General    Patient Location: 2201/1      Subjective   Subjective     Chief Complaint / Subjective  Chief Complaint   Patient presents with   • Chest Pain         Brief Synopsis of Hospital Course/HPI    The patient is a 65-year-old male with h/o CAD s/p CABG, LIZETTE, active tobacco use, obesity, hyperlipidemia, hypertension, DM and CKD stage III.    The patient presented to the emergency room on 3/20/2021 complaining of pressure-like substernal chest pain for several days with intermittent exacerbation.  The patient denies fevers, chills, sweats, cough, orthopnea, PND or increased lower extremity edema.      The ED, chest x-ray ruled out active cardiopulmonary pathology.  COVID-19 was ruled out in the ED.    Date::    3/21/21: Chest pain constant for several days with episodes of worsening chest pain not associated with exertion.  Has not seen a cardiologist for several years.  CABG 15 years ago.  CKD stage III.  LIZETTE.  Stress test ordered.  s/p LHC 3/22/2021 then transferred to [facility]  3/22/21: OOB to chair. No complaints.      Review of Systems   All other systems reviewed and are negative.        Objective   Objective      Vital Signs  Temp:  [97.1 °F (36.2 °C)-98 °F (36.7 °C)] 97.6 °F (36.4 °C)  Heart Rate:  [] 82  Resp:  [16-20] 18  BP: (127-178)/() 145/92  Oxygen Therapy  SpO2: 98 %  Pulse Oximetry Type: Continuous  Device (Oxygen Therapy): nasal cannula  Flow (L/min): 2  Flowsheet Rows      First Filed Value   Admission Height  181.6 cm (71.5\") Documented at 03/20/2021 1824   Admission Weight  122 kg (270 lb) Documented at 03/20/2021 1824        Intake & Output (last 3 days)       03/19 0701 - 03/20 0700 03/20 0701 - 03/21 0700 03/21 0701 - 03/22 0700 03/22 0701 - 03/23 0700    I.V. (mL/kg)   1044 (8.4)     Total Intake(mL/kg)   1044 (8.4)     " Urine (mL/kg/hr)   1050 (0.4) 300 (0.6)    Total Output   1050 300    Net   -6 -300            Urine Unmeasured Occurrence  1 x 1 x         Lines, Drains & Airways    Active LDAs     Name:   Placement date:   Placement time:   Site:   Days:    Peripheral IV 03/20/21 1941 Right Antecubital   03/20/21 1941    Antecubital   less than 1                  Physical Exam:    Physical Exam  HENT:      Head: Normocephalic.      Nose: Nose normal.      Mouth/Throat:      Mouth: Mucous membranes are moist.   Eyes:      General: No scleral icterus.     Extraocular Movements: Extraocular movements intact.      Pupils: Pupils are equal, round, and reactive to light.   Cardiovascular:      Rate and Rhythm: Normal rate and regular rhythm.      Pulses: Normal pulses.   Pulmonary:      Effort: Pulmonary effort is normal.      Breath sounds: Normal breath sounds.   Abdominal:      General: Bowel sounds are normal.      Palpations: Abdomen is soft.   Musculoskeletal:         General: Normal range of motion.      Cervical back: Normal range of motion.   Skin:     General: Skin is warm.   Neurological:      General: No focal deficit present.      Mental Status: He is alert and oriented to person, place, and time.   Psychiatric:         Mood and Affect: Mood normal.           Procedures:        Results Review:     I reviewed the patient's new clinical results.      Lab Results (last 24 hours)     Procedure Component Value Units Date/Time    POC Glucose Once [[id number]]  (Abnormal) Collected: 03/22/21 0722    Specimen: Blood Updated: 03/22/21 0723     Glucose 110 mg/dL      Comment: Serial Number: [id number]:  [id number]       Basic Metabolic Panel [[id number]]  (Abnormal) Collected: 03/22/21 0528    Specimen: Blood Updated: 03/22/21 0635     Glucose 112 mg/dL      BUN 21 mg/dL      Creatinine 1.21 mg/dL      Sodium 138 mmol/L      Potassium 4.0 mmol/L      Chloride 102 mmol/L      CO2 24.0 mmol/L      Calcium 9.4 mg/dL      eGFR  Non African Amer 60 mL/min/1.73      BUN/Creatinine Ratio 17.4     Anion Gap 12.0 mmol/L     Narrative:      GFR Normal >60  Chronic Kidney Disease <60  Kidney Failure <15      Magnesium [[id number]]  (Normal) Collected: 03/22/21 0528    Specimen: Blood Updated: 03/22/21 0635     Magnesium 2.2 mg/dL     CBC & Differential [[id number]]  (Abnormal) Collected: 03/22/21 0528    Specimen: Blood Updated: 03/22/21 0621    Narrative:      The following orders were created for panel order CBC & Differential.  Procedure                               Abnormality         Status                     ---------                               -----------         ------                     CBC Auto Differential[[id number]]        Abnormal            Final result                 Please view results for these tests on the individual orders.    CBC Auto Differential [[id number]]  (Abnormal) Collected: 03/22/21 0528    Specimen: Blood Updated: 03/22/21 0621     WBC 13.20 10*3/mm3      RBC 4.64 10*6/mm3      Hemoglobin 14.3 g/dL      Hematocrit 42.5 %      MCV 91.5 fL      MCH 30.8 pg      MCHC 33.6 g/dL      RDW 13.8 %      RDW-SD 44.6 fl      MPV 9.0 fL      Platelets 218 10*3/mm3      Neutrophil % 81.7 %      Lymphocyte % 10.5 %      Monocyte % 6.1 %      Eosinophil % 1.3 %      Basophil % 0.4 %      Neutrophils, Absolute 10.80 10*3/mm3      Lymphocytes, Absolute 1.40 10*3/mm3      Monocytes, Absolute 0.80 10*3/mm3      Eosinophils, Absolute 0.20 10*3/mm3      Basophils, Absolute 0.10 10*3/mm3      nRBC 0.3 /100 WBC     POC Glucose Once [[id number]]  (Abnormal) Collected: 03/21/21 2143    Specimen: Blood Updated: 03/21/21 2148     Glucose 141 mg/dL      Comment: Serial Number: [id number]:  [id number]       POC Activated Clotting Time [[id number]]  (Abnormal) Collected: 03/21/21 1605    Specimen: Arterial Blood Updated: 03/21/21 1618     Activated Clotting Time  257 Seconds      Comment: Serial Number: [id number]:  [id number]            No results found for: HGBA1C            No results found for: LIPASE  Lab Results   Component Value Date    CHOL 99 03/21/2021    CHLPL 118 07/15/2019    TRIG 213 (H) 03/21/2021    HDL 35 (L) 03/21/2021    LDL 31 03/21/2021       No results found for: INTRAOP, PREDX, FINALDX, COMDX    Microbiology Results (last 10 days)     Procedure Component Value - Date/Time    COVID-19,CEPHEID,COR/ELDA/PAD IN-HOUSE(OR EMERGENT/ADD-ON),NP SWAB IN TRANSPORT MEDIA 3-4 HR TAT, RT-PCR - Swab, Nasopharynx [[id number]]  (Normal) Collected: 03/20/21 2106    Lab Status: Final result Specimen: Swab from Nasopharynx Updated: 03/21/21 0423     COVID19 Not Detected    Narrative:      Fact sheet for providers: https://www.fda.gov/media/988856/download     Fact sheet for patients: https://www.fda.gov/media/302374/download          ECG/EMG Results (most recent)     Procedure Component Value Units Date/Time    ECG 12 Lead [[id number]] Collected: 03/20/21 1915     Updated: 03/21/21 1225     QT Interval 339 ms     Narrative:      HEART RATE= 99  bpm  RR Interval= 608  ms  CA Interval= 172  ms  P Horizontal Axis= -8  deg  P Front Axis= 57  deg  QRSD Interval= 92  ms  QT Interval= 339  ms  QRS Axis= 20  deg  T Wave Axis= 97  deg  - ABNORMAL ECG -  Sinus rhythm  Probable left atrial enlargement  Nonspecific T abnormalities, lateral leads  Electronically Signed By: Alphonso Crandall ([facility]) 21-Mar-2021 12:24:53  Date and Time of Study: 2021-03-20 19:15:57    ECG 12 Lead [[id number]] Collected: 03/22/21 0611     Updated: 03/22/21 0613     QT Interval 354 ms     Narrative:      HEART RATE= 90  bpm  RR Interval= 668  ms  CA Interval= 156  ms  P Horizontal Axis= 2  deg  P Front Axis= 58  deg  QRSD Interval= 93  ms  QT Interval= 354  ms  QRS Axis= 11  deg  T Wave Axis= 70  deg  - NORMAL ECG -  Sinus rhythm  When compared with ECG of 20-Mar-2021 19:15:57,  Significant repolarization change  Significant axis, voltage or hypertrophy change  Electronically Signed  By:   Date and Time of Study: 2021-03-22 06:11:59                  XR Chest 1 View    Result Date: 3/20/2021    1.  Borderline heart size.  No acute cardiopulmonary disease.   Electronically Signed By-Jose Jane MD On:3/20/2021 7:13 PM This report was finalized on [id number] by  Jose Jane MD.          Xrays, labs reviewed personally by physician.    Medication Review:   I have reviewed the patient's current medication list      Scheduled Meds  amLODIPine, 10 mg, Oral, Daily  aspirin, 81 mg, Oral, Daily  atorvastatin, 20 mg, Oral, Daily  enoxaparin, 40 mg, Subcutaneous, Q24H  insulin lispro, 0-9 Units, Subcutaneous, TID AC  losartan, 50 mg, Oral, Daily  nicotine, 1 patch, Transdermal, Q24H  nitroglycerin, 1 inch, Topical, Once  sodium chloride, 10 mL, Intravenous, Q12H  ticagrelor, 90 mg, Oral, Q12H        Meds Infusions  sodium chloride, 100 mL/hr, Last Rate: 100 mL/hr (03/21/21 2322)        Meds PRN  •  acetaminophen **OR** acetaminophen **OR** acetaminophen  •  Calcium Gluconate-NaCl **AND** calcium gluconate **AND** Calcium, Ionized  •  dextrose  •  dextrose  •  glucagon (human recombinant)  •  insulin lispro **AND** insulin lispro  •  magnesium sulfate **OR** magnesium sulfate **OR** magnesium sulfate  •  melatonin  •  nitroglycerin  •  ondansetron **OR** ondansetron  •  potassium & sodium phosphates **OR** potassium & sodium phosphates  •  potassium chloride  •  potassium chloride  •  sodium chloride        Assessment/Plan   Assessment/Plan     Active Hospital Problems    Diagnosis  POA   • **Precordial pain [R07.2]  Yes     Priority: High   • Essential hypertension [I10]  Yes     Priority: Medium   • Diabetes mellitus (CMS/HCC) [E11.9]  Yes     Priority: Medium   • Hyperlipidemia [E78.5]  Yes     Priority: Medium   • Tobacco abuse [Z72.0]  Yes     Priority: Medium   • CAD (coronary artery disease) [I25.10]  Yes     Priority: Medium   • LIZETTE (obstructive sleep apnea) [G47.33]  Yes     Priority:  Medium   • CKD (chronic kidney disease) stage 3, GFR 30-59 ml/min (CMS/[facility]) [N18.30]  Yes     Priority: Medium   • Obesity (BMI 30-39.9) [E66.9]  Yes   • Unstable angina (CMS/[facility]) [I20.0]  Unknown      Resolved Hospital Problems   No resolved problems to display.       MEDICAL DECISION MAKING COMPLEXITY BY PROBLEM:       Unstable angina:  -Acute MI ruled out with cardiac enzymes  -Chest x-ray ruled out cardiopulmonary pathology  -Stress test ordered 3/21/2021--> abnormal  -s/p LHC 3/21/2021--> severely elevated left heart filling pressures with preserved LV systolic function, widely patent LIMA to LAD, severe disease in LAD, stump occlusion of SVG to obtuse marginal branch, patent SVG to right RCA and successful PCI and stenting of SVG to RCA  -Transfer to [facility] post [facility]  -Continue aspirin, Brilinta, Lipitor      CAD s/p CABG  -On Lipitor at home.     Hypertension:  -On amlodipine at home       Diabetes mellitus  -Hold Metformin during hospitalization  -Check A1c  -Continue ISS     CKD III  - Nephrotoxin holds     Hyperlipidemia  -Checked lipid panel  -Continue statin     Obesity (BMI: 37.13)  -Encourage diet lifestyle modifications     Tobacco abuse  -Counseled tobacco cessation             VTE Prophylaxis -   Mechanical Order History:     None      Pharmalogical Order History:      Ordered     Dose Route Frequency Stop    03/20/21 2128  enoxaparin (LOVENOX) syringe 40 mg      40 mg SC Every 24 Hours Scheduled --                  Code Status -   Code Status and Medical Interventions:   Ordered at: 03/21/21 1646     Level Of Support Discussed With:    Patient     Code Status:    CPR     Medical Interventions (Level of Support Prior to Arrest):    Full       This patient has been examined wearing appropriate Personal Protective Equipment and discussed with nursing. 03/22/21        Discharge Planning  defer        Electronically signed by Arsen Quesada DO, 03/22/21, 10:52 EDT.  [facility] Hospitalist  Team

## 2021-03-22 NOTE — DISCHARGE SUMMARY
[facility] Medicine Services  DISCHARGE SUMMARY        Prepared For PCP:  Desiree Almaguer APRN    Patient Name: Jamil Self  : 1955  MRN: [id number]      Date of Admission:   3/20/2021    Date of Discharge:  3/22/2021    Length of stay:  LOS: 0 days     Hospital Course     Presenting Problem:   Precordial pain [R07.2]  Stage 3a chronic kidney disease (CMS/HCC) [N18.31]      Active Hospital Problems    Diagnosis  POA   • Essential hypertension [I10]  Yes     Priority: Medium   • Diabetes mellitus (CMS/HCC) [E11.9]  Yes     Priority: Medium   • Hyperlipidemia [E78.5]  Yes     Priority: Medium   • Tobacco abuse [Z72.0]  Yes     Priority: Medium   • CAD (coronary artery disease) [I25.10]  Yes     Priority: Medium   • LIZETTE (obstructive sleep apnea) [G47.33]  Yes     Priority: Medium   • CKD (chronic kidney disease) stage 3, GFR 30-59 ml/min (CMS/[facility]) [N18.30]  Yes     Priority: Medium   • Obesity (BMI 30-39.9) [E66.9]  Yes      Resolved Hospital Problems    Diagnosis Date Resolved POA   • **Precordial pain [R07.2] 2021 Yes     Priority: High   • Unstable angina (CMS/HCC) [I20.0] 2021 Unknown     Priority: High           Hospital Course:    The patient was admitted to the medical floor.  Acute MI was ruled out with serial cardiac enzymes.  He was evaluated by cardiologist.  Stress test on 3/21/2021 was abnormal therefore underwent LHC which showed severely elevated left heart filling pressures with preserved LV systolic function, widely patent LIMA to LAD, severe disease in LAD, stump occlusion of SVG to obtuse marginal branch, patent SVG to right RCA and successful PCI and stenting of SVG to RCA.      The patient was monitored in [facility] post LHC.  Beta-blocker will not be prescribed because of bradycardia.  The patient will be continued on aspirin, Brilinta and statin on discharge.  He will follow up with cardiologist, Dr. Pillai in 2 weeks.  The patient was strongly  advised to abstain from tobacco use    Problem list addressed during hospitalization      Unstable angina:  -Acute MI ruled out with cardiac enzymes  -Chest x-ray ruled out cardiopulmonary pathology  -Stress test ordered 3/21/2021--> abnormal  -s/p LHC 3/21/2021--> severely elevated left heart filling pressures with preserved LV systolic function, widely patent LIMA to LAD, severe disease in LAD, stump occlusion of SVG to obtuse marginal branch, patent SVG to right RCA and successful PCI and stenting of SVG to RCA  -Transferred to [facility] post [facility] and monitored overnight  -Discharged home on 3/22/2021 on aspirin, Brilinta, Lipitor  -No beta-blocker started because of bradycardia  -Follow-up with cardiologist, Dr. Pillai in 2 weeks        CAD s/p CABG  -On Lipitor at home.  -Aspirin and Brilinta started on discharge  -No beta-blocker because of bradycardia     Hypertension:  -On amlodipine at home        Diabetes mellitus  -Restart Metformin on discharge  -Check A1c 6.2     CKD III  -At baseline     Hyperlipidemia  -Checked lipid panel  -Continue statin     Obesity (BMI: 37.13)  -Encourage diet lifestyle modifications     Tobacco abuse  -Counseled tobacco cessation          Recommendation for Outpatient Providers:             Reasons For Change In Medications and Indications for New Medications:        Day of Discharge     HPI:     The patient is a 65-year-old male with h/o CAD s/p CABG, LIZETTE, active tobacco use, obesity, hyperlipidemia, hypertension, DM and CKD stage III.     The patient presented to the emergency room on 3/20/2021 complaining of pressure-like substernal chest pain for several days with intermittent exacerbation.  The patient denies fevers, chills, sweats, cough, orthopnea, PND or increased lower extremity edema.        The ED, chest x-ray ruled out active cardiopulmonary pathology.  COVID-19 was ruled out in the ED.       Vital Signs:   Temp:  [97.1 °F (36.2 °C)-97.8 °F (36.6 °C)] 97.8 °F (36.6  °C)  Heart Rate:  [] 85  Resp:  [17-20] 18  BP: (142-178)/() 150/79     Physical Exam:  Physical Exam  HENT:      Head: Normocephalic.      Mouth/Throat:      Mouth: Mucous membranes are moist.   Eyes:      General: No scleral icterus.     Extraocular Movements: Extraocular movements intact.      Pupils: Pupils are equal, round, and reactive to light.   Cardiovascular:      Rate and Rhythm: Normal rate and regular rhythm.   Pulmonary:      Effort: Pulmonary effort is normal.      Breath sounds: Normal breath sounds.   Abdominal:      General: Bowel sounds are normal.      Palpations: Abdomen is soft.   Musculoskeletal:         General: Normal range of motion.      Cervical back: Normal range of motion.   Skin:     General: Skin is warm.   Neurological:      General: No focal deficit present.      Mental Status: He is alert and oriented to person, place, and time.   Psychiatric:         Mood and Affect: Mood normal.         Pertinent  and/or Most Recent Results     Results from last 7 days   Lab Units 03/22/21  0528 03/21/21 0131 03/20/21 1939   WBC 10*3/mm3 13.20* 9.00 10.50   HEMOGLOBIN g/dL 14.3 13.8 14.5   HEMATOCRIT % 42.5 40.8 42.4   PLATELETS 10*3/mm3 218 221 228   SODIUM mmol/L 138 139 140   POTASSIUM mmol/L 4.0 4.2 3.8   CHLORIDE mmol/L 102 105 103   CO2 mmol/L 24.0 26.0 27.0   BUN mg/dL 21 27* 28*   CREATININE mg/dL 1.21 1.33* 1.43*   GLUCOSE mg/dL 112* 89 141*   CALCIUM mg/dL 9.4 9.2 9.7           Invalid input(s): PROT, LABALBU  Results from last 7 days   Lab Units 03/21/21 0131   CHOLESTEROL mg/dL 99   TRIGLYCERIDES mg/dL 213*   HDL CHOL mg/dL 35*     Results from last 7 days   Lab Units 03/21/21 0131 03/20/21 1939   HEMOGLOBIN A1C % 6.2*  --    TROPONIN T ng/mL <0.010 <0.010       Brief Urine Lab Results     None          Microbiology Results Abnormal     Procedure Component Value - Date/Time    COVID-19,CEPHEID,COR/ELDA/PAD IN-HOUSE(OR EMERGENT/ADD-ON),NP SWAB IN TRANSPORT MEDIA 3-4 HR  TAT, RT-PCR - Swab, Nasopharynx [[id number]]  (Normal) Collected: 03/20/21 2106    Lab Status: Final result Specimen: Swab from Nasopharynx Updated: 03/21/21 0423     COVID19 Not Detected    Narrative:      Fact sheet for providers: https://www.fda.gov/media/828199/download     Fact sheet for patients: https://www.fda.gov/media/716976/download          XR Chest 1 View    Result Date: 3/20/2021  Impression:   1.  Borderline heart size.  No acute cardiopulmonary disease.   Electronically Signed By-Jose Jane MD On:3/20/2021 7:13 PM This report was finalized on [id number] by  Jose Jane MD.                          Test Results Pending at Discharge        Procedures Performed  Procedure(s):  Left Heart Cath         Consults:   Consults     Date and Time Order Name Status Description    3/21/2021 11:40 AM Inpatient Cardiology Consult      3/20/2021  8:24 PM Hospitalist (on-call MD unless specified) Completed             Discharge Details        Discharge Medications      New Medications      Instructions Start Date   aspirin 81 MG EC tablet   81 mg, Oral, Daily   Start Date: March 23, 2021     ticagrelor 90 MG tablet tablet  Commonly known as: BRILINTA   90 mg, Oral, Every 12 Hours Scheduled         Continue These Medications      Instructions Start Date   amLODIPine 10 MG tablet  Commonly known as: NORVASC   10 mg, Oral, Daily      atorvastatin 20 MG tablet  Commonly known as: LIPITOR   20 mg, Oral, Daily      losartan 50 MG tablet  Commonly known as: COZAAR   50 mg, Oral, Daily      metFORMIN 500 MG tablet  Commonly known as: GLUCOPHAGE   500 mg, Oral, 2 Times Daily With Meals             No Known Allergies      Discharge Disposition:  Home or Self Care    Diet:  Hospital:  Diet Order   Procedures   • Diet Regular         Discharge Activity:         CODE STATUS:    Code Status and Medical Interventions:   Ordered at: 03/21/21 1646     Level Of Support Discussed With:    Patient     Code Status:    CPR      Medical Interventions (Level of Support Prior to Arrest):    Full         Follow-up Appointments  Future Appointments   Date Time Provider Department Center   4/7/2021 10:00 AM Jourdan Pillai MD [facility] None       Additional Instructions for the Follow-ups that You Need to Schedule     Discharge Follow-up with PCP   As directed       Currently Documented PCP:    Desiree Almaguer APRN    PCP Phone Number:    [phone]     Follow Up Details: 2 weeks         Discharge Follow-up with Specified Provider: Dr. Pillai, cardiologist; 2 Weeks   As directed      To: Dr. Pillai, cardiologist    Follow Up: 2 Weeks    Follow Up Details: CAD and stent placement                 Condition on Discharge:      Stable      This patient has been examined wearing appropriate Personal Protective Equipment and discussed with nursing. 03/22/21      Electronically signed by Arsen Quesada DO, 03/22/21, 3:41 PM EDT.      Time: I spent  15  minutes on this discharge activity which included face-to-face encounter with the patient/reviewing the data in the system/coordination of the care with the nursing staff as well as consultants/documentation/entering orders.

## 2021-03-22 NOTE — CONSULTS
"Diabetes Education  Assessment/Teaching    Patient Name:  Jamil Self  YOB: 1955  MRN: [id number]  Admit Date:  3/20/2021      Assessment Date:  3/22/2021    Most Recent Value   General Information    Referral From:  -- [Received consult for general review of diabetes]   Height  180.3 cm (70.98\")   Height Method  Stated   Weight  125 kg (275 lb 12.7 oz)   Weight Method  Standing scale   Pregnancy Assessment   Diabetes History   What type of diabetes do you have?  Type 2   Length of Diabetes Diagnosis  -- [dx about 2 years ago]   Have you had diabetes education/teaching in the past?  yes   When and where was your diabetes education?  saw RD at PCP office when first diagnosed. Was told to eat 43 grams of CHO per meal. Ask pt if he meant 45 grams CHO and he states it was 43 grams   Do you test your blood sugar at home?  yes   Frequency of checks  May only check 1 time/month   Meter type  One Touch Verio   Who performs the test?  self   Have you had low blood sugar? (<70mg/dl)  no   Education Preferences   What areas of diabetes would you like to learn about?  avoiding high blood sugar, avoiding low blood sugar, diabetes complications, testing my blood sugar at home   Nutrition Information   Assessment Topics   Problem Solving - Assessment  Needs education   Reducing Risk - Assessment  Needs education   Monitoring - Assessment  Needs education   DM Goals   Being Active - Goal  Today   Problem Solving - Goal  Today   Reducing Risk - Goal  Today   Monitoring - Goal  Today            Most Recent Value   DM Education Needs   Meter  Has own   Meter Type  One Touch   Frequency of Testing  -- [Discussed checking bs at least 2-3 times/week. Gave log book to record bs]   Blood Glucose Target Range  Discussed A1c this adm of 6.2%. Reviewed healthy bs range and healthy A1c target. Discussed importance of bs control   Medication  Oral [Pt takes metformin 1000 mg bid]   Problem Solving  Hypoglycemia, " Hyperglycemia, Signs, Symptoms, Treatment [Discussed always carrying low bs tx]   Reducing Risks  A1C testing [Gave A1c info sheet]   Physical Activity  -- [Pt was walking 1 hour during lunch break at work but then Covid caused him to work from home and he stopped his exercise.]   Physical Activity Frequency  -- [Discussed importance of beginning exercise again and discussed health benefits]   Motivation  Engaged   Teaching Method  Explanation, Discussion, Handouts   Patient Response  Verbalized understanding            Other Comments:  Pt seen today. Wife at bedside. Pt interested in getting the Freestyle Kylie. Discussed he needs to have PCP order to see if insurance covers CGMS. Pt states he lost 80 lbs but then gained most of this weight back when he stopped exercising during Covid. He feels his meal plan has not changed. He states he does eat low fat. He does not eat red meat and prepares food in low fat ways. Gave pt First Steps booklet. Discussed importance of getting more test strips and checking bs routinely. Pt/wife with no additional questions.       Electronically signed by:  Bianka Haley RN  03/22/21 19:16 EDT

## 2021-03-22 NOTE — PROGRESS NOTES
Discharge Planning Assessment   Haresh     Patient Name: Jamil Self  MRN: [id number]  Today's Date: 3/22/2021    Admit Date: 3/20/2021    Discharge Needs Assessment     Row Name 03/22/21 1248       Living Environment    Lives With  spouse    Current Living Arrangements  home/apartment/condo    Primary Care Provided by  self    Provides Primary Care For  no one    Family Caregiver if Needed  spouse    Quality of Family Relationships  helpful;involved;supportive    Able to Return to Prior Arrangements  yes       Resource/Environmental Concerns    Resource/Environmental Concerns  none    Transportation Concerns  car, none       Transition Planning    Patient/Family Anticipates Transition to  home with family    Patient/Family Anticipated Services at Transition  none    Transportation Anticipated  family or friend will provide       Discharge Needs Assessment    Readmission Within the Last 30 Days  no previous admission in last 30 days    Equipment Currently Used at Home  glucometer    Concerns to be Addressed  medication    Anticipated Changes Related to Illness  none    Equipment Needed After Discharge  none    Provided Post Acute Provider List?  N/A    Provided Post Acute Provider Quality & Resource List?  N/A    Discharge Coordination/Progress  Anticipate routine discharge home. Patient starting Brillinta. Call placed to pharmacy regarding cost.        Discharge Plan     Row Name 03/22/21 1254       Plan    Plan  Anticipate routine discharge home. Patient starting Brillinta. Call placed to pharmacy regarding cost. No Cost for Brillenta.    Patient/Family in Agreement with Plan  yes    Plan Comments  Spoke with patient and spouse. Patient verified his PCP, DME, and pharmacy. Patient lives with his spouse. Has no glucometer sticks. CM putting in for Diabetes nurse educator. Barriers to discharge:        Continued Care and Services - Admitted Since 3/20/2021    Coordination has not been started for this  encounter.       Expected Discharge Date and Time     Expected Discharge Date Expected Discharge Time    Mar 22, 2021         Demographic Summary     Row Name 03/22/21 1245       General Information    Admission Type  inpatient    Arrived From  emergency department    Referral Source  admission list    Reason for Consult  discharge planning    Preferred Language  English     Used During This Interaction  no       Contact Information    Permission Granted to Share Info With          Functional Status     Row Name 03/22/21 1246       Functional Status    Usual Activity Tolerance  good    Current Activity Tolerance  good       Functional Status, IADL    Medications  independent    Meal Preparation  independent    Housekeeping  independent    Laundry  independent    Shopping  independent        Psychosocial    No documentation.           Met with patient in room wearing PPE: mask, face shield/goggles, gloves, gown.      Maintained distance greater than six feet and spent less than 15 minutes in the room.        Anna [place] [address]   [phone]  office  [phone]  fax  [email or web address]

## 2021-03-22 NOTE — PAYOR COMM NOTE
ANGINA      Criteria Review   Verified obs order 3/20- MET FOR INPT  VERIFIED INPT ORDER 3/22/2021  Dx: chest pain, unstable angina  Past history: CAD, CABG, CKD    HAD ABNORMAL STRESS TEST  3/21/21 >>>   HEART CATH 3/21: CONCLUSIONS:   1.  Severely elevated left heart filling pressures with preserved LV systolic function  2.  Normal epicardial anatomy with three-vessel coronary artery disease  3.  Widely patent LIMA to the LAD after which there is borderline severe disease in the LAD albeit in a small caliber vessel.  4.  Stump occlusion of the SVG to the obtuse marginal branch which leaves the circumflex artery on revascularized at this time.  The patient did not have myocardial ischemia in this distribution.  Therefore we will treat this conservatively unless symptoms arise.  5.  Patent SVG to right coronary artery with an intervening critical lesion as described above.  6.  Successful PCI and stenting to the SVG to the right coronary artery     VS; STABLE - HTN NOTED 3/21 WITH REINALDO 178/103  MEDS: PO , NS   CREATININE ELEVATED   ------------------------------------  ANGINA INPT M-40:  Admission is indicated for ALL of the following(1)(2)(3)(4)(5):  Collapse Unstable angina[A] is present due to ischemic symptoms (eg, chest pain, dyspnea) and 1 or more of the following:  New-onset symptoms  Significant change from baseline stable angina symptoms (ie, symptoms that are worse in severity or frequency)  Symptoms at rest or with minimal exertion  Crescendo worsening of symptoms (ie, acutely changing)  Collapse Ischemic symptoms warrant acute intervention (eg, coronary angiography with intent to revascularize if appropriate), as indicated by 1 or more of the following:  Accelerating tempo of ischemic symptoms in preceding 48 hours  History of CABG surgery         Jamil Self (65 y.o. Male)     Date of Birth Social Security Number Address Home Phone MRN    1955  [id number] [place]  "[id number] [phone] [id number]    Religious Marital Status          Mu-ism        Admission Date Admission Type Admitting Provider Attending Provider Department, Room/Bed    3/20/21 Emergency Arsen Quesada DO Gebre-Egziabher, Aman I, DO [facility] CARDIOVASCULAR CARE UNIT,     Discharge Date Discharge Disposition Discharge Destination                       Attending Provider: Arsen Quesada DO    Allergies: No Known Allergies    Isolation: None   Infection: COVID (rule out) (21)   Code Status: CPR    Ht: 180.3 cm (70.98\")   Wt: 125 kg (275 lb 12.7 oz)    Admission Cmt: None   Principal Problem: Precordial pain [R07.2]                 Active Insurance as of 3/20/2021     Primary Coverage     Payor Plan Insurance Group Employer/Plan Group    [employer] BLUE CROSS [employer]M BLUE CROSS BLUE SHIELD PPO [id number]     Payor Plan Address Payor Plan Phone Number Payor Plan Fax Number Effective Dates    [address] [phone]  2021 - None Entered    [place] [address]       Subscriber Name Subscriber Birth Date Member ID       LIBBY LOZANO 1955 [id number]                 Emergency Contacts      (Rel.) Home Phone Work Phone Mobile Phone    RASHEEDA LOZANO (Spouse) [phone] -- --               History & Physical      Kalia Montes PA-C at 21 204     Attestation signed by Arsen Quesada DO at 21 0953    I have read the H&P by Kalia Montes PA-C.    Electronically signed by Arsen Quesada DO, 21, 9:53 AM EDT.                         [facility]      Patient Name: Libby Lozano  : 1955  MRN: [id number]  Primary Care Physician: Desiree Almaguer APRN  Date of admission: 3/20/2021    Patient Care Team:  Desiree Almaguer APRN as PCP - General          Subjective   History Present Illness     Chief Complaint:   Chief Complaint   Patient presents with   • Chest " Pain         Mr. Self is a 65 y.o. male with past medical history of CAD status post CABG, tobacco abuse, obesity, LIZETTE, hyperlipidemia, hypertension, diabetes and CKD who presents to [facility] complaining of chest pain.  Patient reports that on the evening of 3/20/2021 while at rest he began to experience a substernal pressure which radiated to his left arm rated at 9/10 at its worst along with some diaphoresis.  Pain was noted is somewhat worse if he attempted to exert himself and slightly relieved with rest though remained constant for several hours until he presented to the ED when pain began to resolve.  No dyspnea, nausea or vomiting, cough or fever, sensation of tachycardia or palpitations, syncope or near syncope are reported.  Patient does have a history of previous CABG approximately 15 years prior and states that he has done generally well since that point and is not currently followed by cardiologist.  He reports that he does continue to smoke approximately 1-1/2 packs of cigarettes per day but does not drink alcohol.  Patient is compliant with all of his other outpatient medical therapies.    In the ED patient had lab significant for troponin of less than 0.010, creatinine: 1.43 with a BUN of 28 and a GFR of 49.  Remainder of CBC and CMP was generally unremarkable.  Chest x-ray shows cardiomegaly with no acute cardiopulmonary disease.  EKG shows sinus rhythm at 99 with some mild ST depression in lead I, aVL, V5 and V6 with no other obvious ectopy and a QTC of 435 ms    History of Present Illness    Review of Systems   Constitutional: Positive for diaphoresis. Negative for chills, fever and malaise/fatigue.   HENT: Negative.    Eyes: Negative.    Cardiovascular: Positive for chest pain and leg swelling. Negative for dyspnea on exertion, irregular heartbeat, near-syncope, orthopnea and syncope.   Respiratory: Negative for cough, shortness of breath and sputum production.    Endocrine:  Negative.    Skin: Negative.    Musculoskeletal: Negative.    Gastrointestinal: Negative.    Genitourinary: Negative.    Neurological: Negative.    Psychiatric/Behavioral: Negative.            Personal History     Past Medical History: No past medical history on file.    Surgical History:    No past surgical history on file.        Family History: family history is not on file. Otherwise pertinent FHx was reviewed and unremarkable.     Social History:        Medications:  Prior to Admission medications    Not on File       Allergies:  No Known Allergies    Objective   Objective     Vital Signs  Temp:  [97.5 °F (36.4 °C)] 97.5 °F (36.4 °C)  Heart Rate:  [88-97] 88  Resp:  [16-18] 16  BP: (130-187)/(59-97) 146/77  SpO2:  [95 %-97 %] 95 %  on   ;   Device (Oxygen Therapy): room air  Body mass index is 37.13 kg/m².    Physical Exam  Vitals reviewed.   Constitutional:       General: He is not in acute distress.     Appearance: Normal appearance. He is obese. He is not ill-appearing or toxic-appearing.   HENT:      Head: Normocephalic and atraumatic.      Right Ear: External ear normal.      Left Ear: External ear normal.      Nose: Nose normal.      Mouth/Throat:      Mouth: Mucous membranes are moist.   Eyes:      Extraocular Movements: Extraocular movements intact.   Cardiovascular:      Rate and Rhythm: Normal rate and regular rhythm.      Pulses: Normal pulses.      Heart sounds: Normal heart sounds.   Pulmonary:      Effort: Pulmonary effort is normal.      Breath sounds: Wheezing present.   Abdominal:      General: Bowel sounds are normal. There is no distension.      Tenderness: There is no abdominal tenderness.   Musculoskeletal:         General: Normal range of motion.      Cervical back: Normal range of motion.      Right lower leg: Edema present.      Left lower leg: Edema present.      Comments: Mild lower extremity edema noted   Skin:     General: Skin is warm and dry.   Neurological:      General: No focal  deficit present.      Mental Status: He is alert and oriented to person, place, and time.   Psychiatric:         Mood and Affect: Mood normal.         Behavior: Behavior normal.         Thought Content: Thought content normal.         Judgment: Judgment normal.           Results Review:  I have personally reviewed most recent cardiac tracings, lab results and radiology images and interpretations and agree with findings, most notably: Troponin, CBC, CMP, chest x-ray and EKG.    Results from last 7 days   Lab Units 03/20/21 1939   WBC 10*3/mm3 10.50   HEMOGLOBIN g/dL 14.5   HEMATOCRIT % 42.4   PLATELETS 10*3/mm3 228     Results from last 7 days   Lab Units 03/20/21  1939   SODIUM mmol/L 140   POTASSIUM mmol/L 3.8   CHLORIDE mmol/L 103   CO2 mmol/L 27.0   BUN mg/dL 28*   CREATININE mg/dL 1.43*   GLUCOSE mg/dL 141*   CALCIUM mg/dL 9.7   TROPONIN T ng/mL <0.010     Estimated Creatinine Clearance: 68.1 mL/min (A) (by C-G formula based on SCr of 1.43 mg/dL (H)).  Brief Urine Lab Results     None          Microbiology Results (last 10 days)     ** No results found for the last 240 hours. **          ECG/EMG Results (most recent)     Procedure Component Value Units Date/Time    ECG 12 Lead [[id number]] Collected: 03/20/21 1915     Updated: 03/20/21 1918     QT Interval 339 ms     Narrative:      HEART RATE= 99  bpm  RR Interval= 608  ms  GA Interval= 172  ms  P Horizontal Axis= -8  deg  P Front Axis= 57  deg  QRSD Interval= 92  ms  QT Interval= 339  ms  QRS Axis= 20  deg  T Wave Axis= 97  deg  - ABNORMAL ECG -  Sinus rhythm  Probable left atrial enlargement  Nonspecific T abnormalities, lateral leads  Electronically Signed By:   Date and Time of Study: 2021-03-20 19:15:57                  XR Chest 1 View    Result Date: 3/20/2021    1.  Borderline heart size.  No acute cardiopulmonary disease.   Electronically Signed By-Jose Jane MD On:3/20/2021 7:13 PM This report was finalized on [id number] by  Jose Jane  MD.        Estimated Creatinine Clearance: 68.1 mL/min (A) (by C-G formula based on SCr of 1.43 mg/dL (H)).    Assessment/Plan   Assessment/Plan       Active Hospital Problems    Diagnosis  POA   • **Precordial pain [R07.2]  Yes     Priority: High   • Essential hypertension [I10]  Yes     Priority: Medium   • Diabetes mellitus (CMS/[facility]) [E11.9]  Yes     Priority: Medium   • CAD (coronary artery disease) [I25.10]  Yes     Priority: Medium   • CKD (chronic kidney disease) stage 3, GFR 30-59 ml/min (CMS/HCC) [N18.30]  Yes     Priority: Medium   • Hyperlipidemia [E78.5]  Yes     Priority: Low   • Tobacco abuse [Z72.0]  Yes     Priority: Low   • LIZETTE (obstructive sleep apnea) [G47.33]  Yes     Priority: Low   • Obesity (BMI 30-39.9) [E66.9]  Yes     Priority: Low      Resolved Hospital Problems   No resolved problems to display.     Chest pain  -Troponin: Less than 0.010, trend  -Chest x-ray shows cardiomegaly with no acute cardiopulmonary process  -EKG shows sinus rhythm at 99 with mild ST depression in lead I, aVL, V5 and V6 with a QTC of 435 ms  -In the ED patient given 324 mg aspirin and Nitropaste  -Check lipid panel  -Daily aspirin ordered  -N.p.o. after midnight  -Stress test ordered    CAD status post CABG  -Continue statin and cardiac monitoring  -Aspirin as above    Hypertension  -Poorly controlled with a blood pressure of 189/97 on admission (though pressure seems to have improved with resolution of pain, patient refused nitroglycerin in the ED)  -Continue losartan, Norvasc and hydrochlorothiazide  - Monitor while admitted    Diabetes mellitus  -Well controlled with glucose of 141 on admission  - Check A1c  - Hold Metformin  -Sliding scale insulin  -diabetic diet  -monitor AC and HS    CKD  - Creatinine: 1.43, BUN: 28, eGFR: 49  - Avoid nephrotoxic medication and IV dye unless urgently needed  - Monitor BMP and I's and O's    Hyperlipidemia  -Check lipid panel  -Continue statin    Obesity (BMI:  37.13)  -Encourage diet lifestyle modifications    Tobacco abuse  -Patient reports he currently smokes 1.5 packs of cigarettes per day  -Encourage cessation especially in light of patient's comorbid conditions  -Nicotine patch ordered            VTE Prophylaxis -Lovenox   Mechanical Order History:     None      Pharmalogical Order History:     None          CODE STATUS: Full  There are no questions and answers to display.         I discussed the patient's findings and my recommendations with patient and nursing staff.      Signature:Electronically signed by Kalia Montes PA-C, 03/20/21, 9:43 PM EDT.    [facility] Hospitalist Team          Electronically signed by Arsen Quesada DO at 03/21/21 0997          Emergency Department Notes      Alphonso Crandall MD at 03/20/21 2025          Subjective   History of Present Illness  Context: 65-year-old male presents with chest pain.  He states started with symptoms last night.  He describes a pressure.  He reports it improves at times it is worse at times.  Seems to improve with rest worsened with exertion.  He has had some shortness of breath he had broke out in a sweat earlier.  He has had no nausea he does not complain of radiation.  He describes it as just anterior chest.  Location: Anterior chest  Quality: Pressure  Duration: 1 day  Timing: Waxes and wanes  Severity: Mild to moderate   Modifying factors: Improves with rest worsens with exertion  Associated signs and symptoms: Dyspnea, diaphoresis    Review of Systems   Constitutional: Positive for diaphoresis. Negative for fever and unexpected weight change.   HENT: Positive for hearing loss. Negative for congestion and sore throat.    Eyes: Negative for pain and visual disturbance.   Respiratory: Positive for shortness of breath. Negative for cough.    Cardiovascular: Positive for chest pain. Negative for leg swelling.   Gastrointestinal: Negative for abdominal pain, diarrhea and vomiting.    Genitourinary: Negative for dysuria and urgency.   Musculoskeletal: Negative for back pain.   Skin: Negative for rash.   Neurological: Negative for dizziness, weakness and headaches.   Psychiatric/Behavioral: Negative for confusion.       No past medical history on file.  CABG, hypertension, hyperlipidemia  No Known Allergies    No past surgical history on file.    No family history on file.  Social history current smoker  Social History     Socioeconomic History   • Marital status:      Spouse name: Not on file   • Number of children: Not on file   • Years of education: Not on file   • Highest education level: Not on file     Current medications Lipitor Norvasc Cozaar      Objective   Physical Exam  65-year-old male awake alert.  Generally overweight.  Pupils equal round at light.  Oropharynx Colton membranes moist neck supple chest clear equal breath sounds.  Cardiovascular regular rate and rhythm chest wall nontender abdomen soft nontender.  Extremities without tenderness edema.  Skin without rash noted.  Neurologic exam no focal findings noted.  Procedures          ED Course      Results for orders placed or performed during the hospital encounter of 03/20/21   Basic Metabolic Panel    Specimen: Arm, Right; Blood   Result Value Ref Range    Glucose 141 (H) 65 - 99 mg/dL    BUN 28 (H) 8 - 23 mg/dL    Creatinine 1.43 (H) 0.76 - 1.27 mg/dL    Sodium 140 136 - 145 mmol/L    Potassium 3.8 3.5 - 5.2 mmol/L    Chloride 103 98 - 107 mmol/L    CO2 27.0 22.0 - 29.0 mmol/L    Calcium 9.7 8.6 - 10.5 mg/dL    eGFR Non African Amer 49 (L) >60 mL/min/1.73    BUN/Creatinine Ratio 19.6 7.0 - 25.0    Anion Gap 10.0 5.0 - 15.0 mmol/L   Troponin    Specimen: Arm, Right; Blood   Result Value Ref Range    Troponin T <0.010 0.000 - 0.030 ng/mL   CBC Auto Differential    Specimen: Arm, Right; Blood   Result Value Ref Range    WBC 10.50 3.40 - 10.80 10*3/mm3    RBC 4.61 4.14 - 5.80 10*6/mm3    Hemoglobin 14.5 13.0 - 17.7 g/dL     "Hematocrit 42.4 37.5 - 51.0 %    MCV 92.0 79.0 - 97.0 fL    MCH 31.4 26.6 - 33.0 pg    MCHC 34.2 31.5 - 35.7 g/dL    RDW 13.2 12.3 - 15.4 %    RDW-SD 41.6 37.0 - 54.0 fl    MPV 9.1 6.0 - 12.0 fL    Platelets 228 140 - 450 10*3/mm3    Neutrophil % 76.6 (H) 42.7 - 76.0 %    Lymphocyte % 15.1 (L) 19.6 - 45.3 %    Monocyte % 6.4 5.0 - 12.0 %    Eosinophil % 1.2 0.3 - 6.2 %    Basophil % 0.7 0.0 - 1.5 %    Neutrophils, Absolute 8.10 (H) 1.70 - 7.00 10*3/mm3    Lymphocytes, Absolute 1.60 0.70 - 3.10 10*3/mm3    Monocytes, Absolute 0.70 0.10 - 0.90 10*3/mm3    Eosinophils, Absolute 0.10 0.00 - 0.40 10*3/mm3    Basophils, Absolute 0.10 0.00 - 0.20 10*3/mm3    nRBC 0.0 0.0 - 0.2 /100 WBC   ECG 12 Lead   Result Value Ref Range    QT Interval 339 ms     XR Chest 1 View    Result Date: 3/20/2021    1.  Borderline heart size.  No acute cardiopulmonary disease.   Electronically Signed By-Jose Jane MD On:3/20/2021 7:13 PM This report was finalized on [id number] by  Jose Jane MD.    Medications   nitroglycerin (NITROSTAT) ointment 1 inch (1 inch Topical Not Given 3/20/21 1944)   aspirin chewable tablet 324 mg (324 mg Oral Given 3/20/21 1944)     /77   Pulse 88   Temp 97.5 °F (36.4 °C)   Resp 16   Ht 181.6 cm (71.5\")   Wt 122 kg (270 lb)   SpO2 95%   BMI 37.13 kg/m²                                          MDM  Chart review: Patient has had office visits for type 2 diabetes with 3 8 chronic kidney disease hypertension and hyperlipidemia  Comorbidity: As per past history  Differential: Chest wall pain, reflux, angina, MI,  My EKG interpretation: Sinus rhythm left atrial abnormality nonspecific ST-T wave abnormality.  Compared to previous no significant change  Lab: CBC without significant abnormality, basic metabolic panel glucose 141 BUN 28 creatinine 1.43 potassium 3.8, troponin negative  Radiology: I reviewed chest x-ray.  No acute cardiopulmonary abnormality noted there is borderline heart size noted.  " Discussion/treatment: Patient IV placed.  Was given aspirin and Nitropaste was placed.  He had improvement in his blood pressure with treatment.  Patient's findings were discussed with him.  He was discussed with the PA for the hospitalist.  He was brought in for observation further cardiac evaluation.  His renal function is noted to be at baseline.  Patient was evaluated using appropriate PPE      Final diagnoses:   Precordial pain   Stage 3a chronic kidney disease (CMS/HCC)       ED Disposition  ED Disposition     ED Disposition Condition Comment    Decision to Admit            No follow-up provider specified.       Medication List      No changes were made to your prescriptions during this visit.          Alphonso Crandall MD  03/20/21 2032      Electronically signed by Alphonso Crandall MD at 03/20/21 2032       Physician Progress Notes (last 24 hours) (Notes from 03/21/21 1029 through 03/22/21 1029)    No notes of this type exist for this encounter.            Consult Notes (last 24 hours) (Notes from 03/21/21 1029 through 03/22/21 1029)      Jourdan Pillai MD at 03/21/21 1209          [facility]    Desiree Almaguer, ARIK    CHIEF COMPLAINT: Chest pain    HISTORY OF PRESENT ILLNESS:    65-year-old male patient with history of CAD status post coronary artery bypass grafting in the past 15 years ago with coronary artery risk factor significant for atherogenic dyslipidemia, current tobacco use, hypertension and diabetes mellitus type 2.  He also has a past medical history significant for CKD's stage III.  Yesterday he is presented to the emergency room with chest pain.  He underwent stress testing which I personally reviewed the images from which showed inducible inferior wall ischemia.  Also reviewed his chest x-ray which showed no active cardiopulmonary process.  His chest pain is associated with mild shortness of breath but no diaphoresis or nausea.    I also  "reviewed his electrocardiogram from yesterday which showed mild but nondiagnostic ST segment elevation of less than 0.5 mm with reciprocal changes in the limb leads again nondiagnostic.  However interpreted in the setting of the above it is likely this patient has a right coronary artery lesion versus a dominant left circumflex lesion.      No past medical history on file.  No past surgical history on file.  No family history on file.  Social History     Tobacco Use   • Smoking status: Never Smoker   • Smokeless tobacco: Never Used   Substance Use Topics   • Alcohol use: Never   • Drug use: Never     Medications Prior to Admission   Medication Sig Dispense Refill Last Dose   • amLODIPine (NORVASC) 10 MG tablet Take 10 mg by mouth Daily.      • atorvastatin (LIPITOR) 20 MG tablet Take 20 mg by mouth Daily.      • losartan (COZAAR) 50 MG tablet Take 50 mg by mouth Daily.      • metFORMIN (GLUCOPHAGE) 500 MG tablet Take 500 mg by mouth 2 (Two) Times a Day With Meals.        Allergies:  Patient has no known allergies.      Review of Symptoms:  Constitutional: Patient afebrile no chills or unexpected weight changes  Respiratory: No cough, no wheezing or dyspnea  Cardiovascular: Today the patient complains of no chest pain, palpitations, dyspnea, orthopnea and no edema  Gastrointestinal: No nausea, vomiting, constipation or diarrhea.  No melena or dark stools    All other systems reviewed and are negative          Vital Signs  Temp:  [97.3 °F (36.3 °C)-98 °F (36.7 °C)] 98 °F (36.7 °C)  Heart Rate:  [] 87  Resp:  [16-18] 18  BP: (121-187)/(59-97) 129/72  Oxygen Therapy  SpO2: 94 %  Pulse Oximetry Type: Intermittent  Device (Oxygen Therapy): room air}  Body mass index is 38.65 kg/m².  Flowsheet Rows      First Filed Value   Admission Height  181.6 cm (71.5\") Documented at 03/20/2021 1824   Admission Weight  122 kg (270 lb) Documented at 03/20/2021 1824             Physical exam  Constitutional: well-nourished, and " appears stated age in no acute distress  PERRL: Conjunctiva clear, no pallor, anicteric  HENMT: normocephalic, normal dentition, no cyanosis or pallor  Neck:no bruits, or thrills and bilateral normal carotid upstroke. Normal jugular venous pressure  Cardiovascular: No parasternal heaves an non-displaced focal PMI. Normal rate and rhythm: no rub, gallop, murmur or click and normal S1 and S2; no lower or upper extremity edema.   Lungs: unlabored, no wheezing with no rales or rhonchi on auscultation.  Extremities: Warm, no clubbing, cyanosis. Full and equal peripheral pulses in extremities with no bruits appreciated.   Abdomen: soft, non-tender, non-distended  Musculoskeletal: no joint tenderness or swelling and no erythema  Skin: Warm and dry, non-erythematous   Neuro:alert and normal affect. Oriented to time, place and person.          Results Review:    I reviewed the patient's new clinical results.  Lab Results (most recent)     Procedure Component Value Units Date/Time    POC Glucose Once [[id number]]  (Normal) Collected: 03/21/21 0704    Specimen: Blood Updated: 03/21/21 0705     Glucose 97 mg/dL      Comment: Serial Number: [id number]:  [id number]       COVID-19,CEPHEID,COR/ELDA/PAD IN-HOUSE(OR EMERGENT/ADD-ON),NP SWAB IN TRANSPORT MEDIA 3-4 HR TAT, RT-PCR - Swab, Nasopharynx [[id number]]  (Normal) Collected: 03/20/21 2106    Specimen: Swab from Nasopharynx Updated: 03/21/21 0423     COVID19 Not Detected    Narrative:      Fact sheet for providers: https://www.fda.gov/media/537028/download     Fact sheet for patients: https://www.fda.gov/media/557532/download    Basic Metabolic Panel [[id number]]  (Abnormal) Collected: 03/21/21 0131    Specimen: Blood Updated: 03/21/21 0240     Glucose 89 mg/dL      BUN 27 mg/dL      Creatinine 1.33 mg/dL      Sodium 139 mmol/L      Potassium 4.2 mmol/L      Comment: Slight hemolysis detected by analyzer. Results may be affected.        Chloride 105 mmol/L      CO2 26.0 mmol/L       Calcium 9.2 mg/dL      eGFR Non African Amer 54 mL/min/1.73      BUN/Creatinine Ratio 20.3     Anion Gap 8.0 mmol/L     Narrative:      GFR Normal >60  Chronic Kidney Disease <60  Kidney Failure <15      Lipid Panel [[id number]]  (Abnormal) Collected: 03/21/21 0131    Specimen: Blood Updated: 03/21/21 0240     Total Cholesterol 99 mg/dL      Triglycerides 213 mg/dL      HDL Cholesterol 35 mg/dL      LDL Cholesterol  31 mg/dL      VLDL Cholesterol 33 mg/dL      LDL/HDL Ratio 0.61    Narrative:      Cholesterol Reference Ranges  (U.S. Department of Health and Human Services ATP III Classifications)    Desirable          <200 mg/dL  Borderline High    200-239 mg/dL  High Risk          >240 mg/dL      Triglyceride Reference Ranges  (U.S. Department of Health and [facility] ATP III Classifications)    Normal           <150 mg/dL  Borderline High  150-199 mg/dL  High             200-499 mg/dL  Very High        >500 mg/dL    HDL Reference Ranges  (U.S[place] Department of Health and Human Services ATP III Classifcations)    Low     <40 mg/dl (major risk factor for CHD)  High    >60 mg/dl ('negative' risk factor for CHD)        LDL Reference Ranges  (U.S. Department of Health and Human Services ATP III Classifcations)    Optimal          <100 mg/dL  Near Optimal     100-129 mg/dL  Borderline High  130-159 mg/dL  High             160-189 mg/dL  Very High        >189 mg/dL    Troponin [[id number]]  (Normal) Collected: 03/21/21 0131    Specimen: Blood Updated: 03/21/21 0240     Troponin T <0.010 ng/mL     Narrative:      Troponin T Reference Range:  <= 0.03 ng/mL-   Negative for AMI  >0.03 ng/mL-     Abnormal for myocardial necrosis.  Clinicians would have to utilize clinical acumen, EKG, Troponin and serial changes to determine if it is an Acute Myocardial Infarction or myocardial injury due to an underlying chronic condition.       Results may be falsely decreased if patient taking Biotin.      Magnesium [[id number]]   (Normal) Collected: 03/21/21 0131    Specimen: Blood Updated: 03/21/21 0240     Magnesium 2.0 mg/dL     CBC & Differential [[id number]]  (Normal) Collected: 03/21/21 0131    Specimen: Blood Updated: 03/21/21 0210    Narrative:      The following orders were created for panel order CBC & Differential.  Procedure                               Abnormality         Status                     ---------                               -----------         ------                     CBC Auto Differential[[id number]]        Normal              Final result                 Please view results for these tests on the individual orders.    CBC Auto Differential [[id number]]  (Normal) Collected: 03/21/21 0131    Specimen: Blood Updated: 03/21/21 0210     WBC 9.00 10*3/mm3      RBC 4.45 10*6/mm3      Hemoglobin 13.8 g/dL      Hematocrit 40.8 %      MCV 91.6 fL      MCH 31.1 pg      MCHC 33.9 g/dL      RDW 13.6 %      RDW-SD 43.8 fl      MPV 9.0 fL      Platelets 221 10*3/mm3      Neutrophil % 61.7 %      Lymphocyte % 28.0 %      Monocyte % 7.3 %      Eosinophil % 2.1 %      Basophil % 0.9 %      Neutrophils, Absolute 5.60 10*3/mm3      Lymphocytes, Absolute 2.50 10*3/mm3      Monocytes, Absolute 0.70 10*3/mm3      Eosinophils, Absolute 0.20 10*3/mm3      Basophils, Absolute 0.10 10*3/mm3      nRBC 0.1 /100 WBC     Hemoglobin A1c [[id number]] Collected: 03/21/21 0131    Specimen: Blood Updated: 03/21/21 0207    Basic Metabolic Panel [[id number]]  (Abnormal) Collected: 03/20/21 1939    Specimen: Blood from Arm, Right Updated: 03/20/21 2012     Glucose 141 mg/dL      BUN 28 mg/dL      Creatinine 1.43 mg/dL      Sodium 140 mmol/L      Potassium 3.8 mmol/L      Chloride 103 mmol/L      CO2 27.0 mmol/L      Calcium 9.7 mg/dL      eGFR Non African Amer 49 mL/min/1.73      BUN/Creatinine Ratio 19.6     Anion Gap 10.0 mmol/L     Narrative:      GFR Normal >60  Chronic Kidney Disease <60  Kidney Failure <15      Troponin [[id number]]  (Normal)  Collected: 03/20/21 1939    Specimen: Blood from Arm, Right Updated: 03/20/21 2012     Troponin T <0.010 ng/mL     Narrative:      Troponin T Reference Range:  <= 0.03 ng/mL-   Negative for AMI  >0.03 ng/mL-     Abnormal for myocardial necrosis.  Clinicians would have to utilize clinical acumen, EKG, Troponin and serial changes to determine if it is an Acute Myocardial Infarction or myocardial injury due to an underlying chronic condition.       Results may be falsely decreased if patient taking Biotin.      CBC & Differential [[id number]]  (Abnormal) Collected: 03/20/21 1939    Specimen: Blood from Arm, Right Updated: 03/20/21 1946    Narrative:      The following orders were created for panel order CBC & Differential.  Procedure                               Abnormality         Status                     ---------                               -----------         ------                     CBC Auto Differential[[id number]]        Abnormal            Final result                 Please view results for these tests on the individual orders.    CBC Auto Differential [[id number]]  (Abnormal) Collected: 03/20/21 1939    Specimen: Blood from Arm, Right Updated: 03/20/21 1946     WBC 10.50 10*3/mm3      RBC 4.61 10*6/mm3      Hemoglobin 14.5 g/dL      Hematocrit 42.4 %      MCV 92.0 fL      MCH 31.4 pg      MCHC 34.2 g/dL      RDW 13.2 %      RDW-SD 41.6 fl      MPV 9.1 fL      Platelets 228 10*3/mm3      Neutrophil % 76.6 %      Lymphocyte % 15.1 %      Monocyte % 6.4 %      Eosinophil % 1.2 %      Basophil % 0.7 %      Neutrophils, Absolute 8.10 10*3/mm3      Lymphocytes, Absolute 1.60 10*3/mm3      Monocytes, Absolute 0.70 10*3/mm3      Eosinophils, Absolute 0.10 10*3/mm3      Basophils, Absolute 0.10 10*3/mm3      nRBC 0.0 /100 WBC           Imaging Results (Most Recent)     Procedure Component Value Units Date/Time    XR Chest 1 View [[id number]] Collected: 03/20/21 1911     Updated: 03/20/21 1915    Narrative:      XR  CHEST 1 VW-     Date of Exam: 3/20/2021 6:39 PM     Indication: pain.     Comparison: None available.     Technique: A single view of the chest was obtained.     FINDINGS:      Patient status post median sternotomy.  Heart size is at the upper  limits of normal.  Pulmonary vessels are normal.  Lungs are clear.   There are no pleural effusions.  Patient is rotated to the left on  today's exam.             Impression:            1.  Borderline heart size.  No acute cardiopulmonary disease.        Electronically Signed By-Jose Jane MD On:3/20/2021 7:13 PM  This report was finalized on [id number] by  Jose Jane MD.        reviewed    ECG/EMG Results (most recent)     Procedure Component Value Units Date/Time    ECG 12 Lead [[id number]] Collected: 03/20/21 1915     Updated: 03/20/21 1918     QT Interval 339 ms     Narrative:      HEART RATE= 99  bpm  RR Interval= 608  ms  NV Interval= 172  ms  P Horizontal Axis= -8  deg  P Front Axis= 57  deg  QRSD Interval= 92  ms  QT Interval= 339  ms  QRS Axis= 20  deg  T Wave Axis= 97  deg  - ABNORMAL ECG -  Sinus rhythm  Probable left atrial enlargement  Nonspecific T abnormalities, lateral leads  Electronically Signed By:   Date and Time of Study: 2021-03-20 19:15:57        reviewed    Assessment/Plan     Acute coronary syndrome  Atherogenic dyslipidemia  CAD  Unstable angina    Given the above, in the setting of the positive stress test, the patient should undergo cardiac catheterization at this time.    Greater than 30 minutes total of face-to-face/floor  time was spent with the patient and family and nursing coordinating plan and patient management.  Of which counseling of patient with regard specifically to acute coronary syndrome comprised 50% of this total time.        Jourdan Pillai MD  03/21/21  12:09 EDT            Electronically signed by Jourdan Pillai MD at 03/21/21 1231

## 2021-03-23 ENCOUNTER — TELEPHONE (OUTPATIENT)
Dept: CARDIOLOGY | Facility: CLINIC | Age: 66
End: 2021-03-23

## 2021-03-23 NOTE — PAYOR COMM NOTE
"This is discharge notice for Libby Lzoano , auth# [id number]  Pt discharged routine to home on 3/22/21.    ANGELO ABRAHAM RN  UTILIZATION REVIEW  [facility]  PH: [phone]  FX: [phone]      Libby Lozano (65 y.o. Male)     Date of Birth Social Security Number Address Home Phone MRN    1955  [address] [address] [phone] [id number]    Nondenominational Marital Status          Tenriism        Admission Date Admission Type Admitting Provider Attending Provider Department, Room/Bed    3/20/21 Emergency Arsen Quesada DO  [facility] CARDIOVASCULAR CARE UNIT,     Discharge Date Discharge Disposition Discharge Destination        3/22/2021 Home or Self Care              Attending Provider: (none)   Allergies: No Known Allergies    Isolation: None   Infection: None   Code Status: Prior    Ht: 180.3 cm (70.98\")   Wt: 125 kg (275 lb 12.7 oz)    Admission Cmt: None   Principal Problem: Precordial pain [R07.2]                 Active Insurance as of 3/20/2021     Primary Coverage     Payor Plan Insurance Group Employer/Plan Group    [employer] CROSS BLUE [employer] PPO [id number]     Payor Plan Address Payor Plan Phone Number Payor Plan Fax Number Effective Dates    [address] [phone]  2021 - None Entered    [place] [address]       Subscriber Name Subscriber Birth Date Member ID       LIBBY LOZANO 1955 [id number]                 Emergency Contacts      (Rel.) Home Phone Work Phone Mobile Phone    RASHEEDA LOZANO (Spouse) [phone] -- --               Discharge Summary      Arsen Quesada DO at 21 1541                [facility]  DISCHARGE SUMMARY        Prepared For PCP:  Desiree Almaguer APRN    Patient Name: Libby Lozano  : 1955  MRN: [id number]      Date of Admission:   3/20/2021    Date of Discharge:  " 3/22/2021    Length of stay:  LOS: 0 days     Hospital Course     Presenting Problem:   Precordial pain [R07.2]  Stage 3a chronic kidney disease (CMS/[facility]) [N18.31]      Active Hospital Problems    Diagnosis  POA   • Essential hypertension [I10]  Yes     Priority: Medium   • Diabetes mellitus (CMS/[facility]) [E11.9]  Yes     Priority: Medium   • Hyperlipidemia [E78.5]  Yes     Priority: Medium   • Tobacco abuse [Z72.0]  Yes     Priority: Medium   • CAD (coronary artery disease) [I25.10]  Yes     Priority: Medium   • LIZETTE (obstructive sleep apnea) [G47.33]  Yes     Priority: Medium   • CKD (chronic kidney disease) stage 3, GFR 30-59 ml/min (CMS/[facility]) [N18.30]  Yes     Priority: Medium   • Obesity (BMI 30-39.9) [E66.9]  Yes      Resolved Hospital Problems    Diagnosis Date Resolved POA   • **Precordial pain [R07.2] 03/22/2021 Yes     Priority: High   • Unstable angina (CMS/[facility]) [I20.0] 03/22/2021 Unknown     Priority: High           Hospital Course:    The patient was admitted to the medical floor.  Acute MI was ruled out with serial cardiac enzymes.  He was evaluated by cardiologist.  Stress test on 3/21/2021 was abnormal therefore underwent LHC which showed severely elevated left heart filling pressures with preserved LV systolic function, widely patent LIMA to LAD, severe disease in LAD, stump occlusion of SVG to obtuse marginal branch, patent SVG to right RCA and successful PCI and stenting of SVG to RCA.      The patient was monitored in [facility] post LHC.  Beta-blocker will not be prescribed because of bradycardia.  The patient will be continued on aspirin, Brilinta and statin on discharge.  He will follow up with cardiologist, Dr. Pillai in 2 weeks.  The patient was strongly advised to abstain from tobacco use    Problem list addressed during hospitalization      Unstable angina:  -Acute MI ruled out with cardiac enzymes  -Chest x-ray ruled out cardiopulmonary pathology  -Stress test ordered 3/21/2021--> abnormal  -s/p  [facility] 3/21/2021--> severely elevated left heart filling pressures with preserved LV systolic function, widely patent LIMA to LAD, severe disease in LAD, stump occlusion of SVG to obtuse marginal branch, patent SVG to right RCA and successful PCI and stenting of SVG to RCA  -Transferred to [facility] post [facility] and monitored overnight  -Discharged home on 3/22/2021 on aspirin, Brilinta, Lipitor  -No beta-blocker started because of bradycardia  -Follow-up with cardiologist, Dr. Pillai in 2 weeks        CAD s/p CABG  -On Lipitor at home.  -Aspirin and Brilinta started on discharge  -No beta-blocker because of bradycardia     Hypertension:  -On amlodipine at home        Diabetes mellitus  -Restart Metformin on discharge  -Check A1c 6.2     CKD III  -At baseline     Hyperlipidemia  -Checked lipid panel  -Continue statin     Obesity (BMI: 37.13)  -Encourage diet lifestyle modifications     Tobacco abuse  -Counseled tobacco cessation          Recommendation for Outpatient Providers:             Reasons For Change In Medications and Indications for New Medications:        Day of Discharge     HPI:     The patient is a 65-year-old male with h/o CAD s/p CABG, LIZETTE, active tobacco use, obesity, hyperlipidemia, hypertension, DM and CKD stage III.     The patient presented to the emergency room on 3/20/2021 complaining of pressure-like substernal chest pain for several days with intermittent exacerbation.  The patient denies fevers, chills, sweats, cough, orthopnea, PND or increased lower extremity edema.        The ED, chest x-ray ruled out active cardiopulmonary pathology.  COVID-19 was ruled out in the ED.       Vital Signs:   Temp:  [97.1 °F (36.2 °C)-97.8 °F (36.6 °C)] 97.8 °F (36.6 °C)  Heart Rate:  [] 85  Resp:  [17-20] 18  BP: (142-178)/() 150/79     Physical Exam:  Physical Exam  HENT:      Head: Normocephalic.      Mouth/Throat:      Mouth: Mucous membranes are moist.   Eyes:      General: No scleral icterus.      Extraocular Movements: Extraocular movements intact.      Pupils: Pupils are equal, round, and reactive to light.   Cardiovascular:      Rate and Rhythm: Normal rate and regular rhythm.   Pulmonary:      Effort: Pulmonary effort is normal.      Breath sounds: Normal breath sounds.   Abdominal:      General: Bowel sounds are normal.      Palpations: Abdomen is soft.   Musculoskeletal:         General: Normal range of motion.      Cervical back: Normal range of motion.   Skin:     General: Skin is warm.   Neurological:      General: No focal deficit present.      Mental Status: He is alert and oriented to person, place, and time.   Psychiatric:         Mood and Affect: Mood normal.         Pertinent  and/or Most Recent Results     Results from last 7 days   Lab Units 03/22/21  0528 03/21/21  0131 03/20/21 1939   WBC 10*3/mm3 13.20* 9.00 10.50   HEMOGLOBIN g/dL 14.3 13.8 14.5   HEMATOCRIT % 42.5 40.8 42.4   PLATELETS 10*3/mm3 218 221 228   SODIUM mmol/L 138 139 140   POTASSIUM mmol/L 4.0 4.2 3.8   CHLORIDE mmol/L 102 105 103   CO2 mmol/L 24.0 26.0 27.0   BUN mg/dL 21 27* 28*   CREATININE mg/dL 1.21 1.33* 1.43*   GLUCOSE mg/dL 112* 89 141*   CALCIUM mg/dL 9.4 9.2 9.7           Invalid input(s): PROT, LABALBU  Results from last 7 days   Lab Units 03/21/21  0131   CHOLESTEROL mg/dL 99   TRIGLYCERIDES mg/dL 213*   HDL CHOL mg/dL 35*     Results from last 7 days   Lab Units 03/21/21  0131 03/20/21 1939   HEMOGLOBIN A1C % 6.2*  --    TROPONIN T ng/mL <0.010 <0.010       Brief Urine Lab Results     None          Microbiology Results Abnormal     Procedure Component Value - Date/Time    COVID-19,CEPHEID,COR/ELDA/PAD IN-HOUSE(OR EMERGENT/ADD-ON),NP SWAB IN TRANSPORT MEDIA 3-4 HR TAT, RT-PCR - Swab, Nasopharynx [[id number]]  (Normal) Collected: 03/20/21 2106    Lab Status: Final result Specimen: Swab from Nasopharynx Updated: 03/21/21 0423     COVID19 Not Detected    Narrative:      Fact sheet for providers:  https://www.fda.gov/media/632393/download     Fact sheet for patients: https://www.fda.gov/media/137937/download          XR Chest 1 View    Result Date: 3/20/2021  Impression:   1.  Borderline heart size.  No acute cardiopulmonary disease.   Electronically Signed By-Jose Jane MD On:3/20/2021 7:13 PM This report was finalized on [id number] by  Jose Jane MD.                          Test Results Pending at Discharge        Procedures Performed  Procedure(s):  Left Heart Cath         Consults:   Consults     Date and Time Order Name Status Description    3/21/2021 11:40 AM Inpatient Cardiology Consult      3/20/2021  8:24 PM Hospitalist (on-call MD unless specified) Completed             Discharge Details        Discharge Medications      New Medications      Instructions Start Date   aspirin 81 MG EC tablet   81 mg, Oral, Daily   Start Date: March 23, 2021     ticagrelor 90 MG tablet tablet  Commonly known as: BRILINTA   90 mg, Oral, Every 12 Hours Scheduled         Continue These Medications      Instructions Start Date   amLODIPine 10 MG tablet  Commonly known as: NORVASC   10 mg, Oral, Daily      atorvastatin 20 MG tablet  Commonly known as: LIPITOR   20 mg, Oral, Daily      losartan 50 MG tablet  Commonly known as: COZAAR   50 mg, Oral, Daily      metFORMIN 500 MG tablet  Commonly known as: GLUCOPHAGE   500 mg, Oral, 2 Times Daily With Meals             No Known Allergies      Discharge Disposition:  Home or Self Care    Diet:  Hospital:  Diet Order   Procedures   • Diet Regular         Discharge Activity:         CODE STATUS:    Code Status and Medical Interventions:   Ordered at: 03/21/21 1646     Level Of Support Discussed With:    Patient     Code Status:    CPR     Medical Interventions (Level of Support Prior to Arrest):    Full         Follow-up Appointments  Future Appointments   Date Time Provider Department Center   4/7/2021 10:00 AM Jourdan Pillai MD [facility] None        Additional Instructions for the Follow-ups that You Need to Schedule     Discharge Follow-up with PCP   As directed       Currently Documented PCP:    Desiree Almaguer APRN    PCP Phone Number:    [phone]     Follow Up Details: 2 weeks         Discharge Follow-up with Specified Provider: Dr. Pillai, cardiologist; 2 Weeks   As directed      To: Dr. Pillai, cardiologist    Follow Up: 2 Weeks    Follow Up Details: CAD and stent placement                 Condition on Discharge:      Stable      This patient has been examined wearing appropriate Personal Protective Equipment and discussed with nursing. 03/22/21      Electronically signed by Arsen Quesada DO, 03/22/21, 3:41 PM EDT.      Time: I spent  15  minutes on this discharge activity which included face-to-face encounter with the patient/reviewing the data in the system/coordination of the care with the nursing staff as well as consultants/documentation/entering orders.      Electronically signed by Arsen Quesada DO at 03/22/21 8791

## 2021-03-24 LAB — QT INTERVAL: 354 MS

## 2021-03-25 ENCOUNTER — TRANSCRIBE ORDERS (OUTPATIENT)
Dept: CARDIAC REHAB | Facility: HOSPITAL | Age: 66
End: 2021-03-25

## 2021-03-25 ENCOUNTER — TELEPHONE (OUTPATIENT)
Dept: CARDIAC REHAB | Facility: HOSPITAL | Age: 66
End: 2021-03-25

## 2021-03-25 DIAGNOSIS — Z95.5 S/P DRUG ELUTING CORONARY STENT PLACEMENT: Primary | ICD-10-CM

## 2021-03-25 NOTE — TELEPHONE ENCOUNTER
Called pt to follow up with. Says he is interested in doing rehab. Told him we'll verify his insurance for coverage and get order from Dr. Pillai then call him back.

## 2021-04-07 ENCOUNTER — OFFICE VISIT (OUTPATIENT)
Dept: CARDIOLOGY | Facility: CLINIC | Age: 66
End: 2021-04-07

## 2021-04-07 VITALS
RESPIRATION RATE: 18 BRPM | HEART RATE: 98 BPM | WEIGHT: 278 LBS | DIASTOLIC BLOOD PRESSURE: 70 MMHG | HEIGHT: 71 IN | SYSTOLIC BLOOD PRESSURE: 162 MMHG | BODY MASS INDEX: 38.92 KG/M2

## 2021-04-07 DIAGNOSIS — I25.10 CORONARY ARTERY DISEASE INVOLVING NATIVE CORONARY ARTERY OF NATIVE HEART WITHOUT ANGINA PECTORIS: Primary | Chronic | ICD-10-CM

## 2021-04-07 DIAGNOSIS — E78.2 MIXED HYPERLIPIDEMIA: Chronic | ICD-10-CM

## 2021-04-07 DIAGNOSIS — G47.33 OSA (OBSTRUCTIVE SLEEP APNEA): Chronic | ICD-10-CM

## 2021-04-07 DIAGNOSIS — I10 ESSENTIAL HYPERTENSION: Chronic | ICD-10-CM

## 2021-04-07 PROCEDURE — 99214 OFFICE O/P EST MOD 30 MIN: CPT | Performed by: INTERNAL MEDICINE

## 2021-04-07 NOTE — PROGRESS NOTES
Subjective:     Encounter Date:04/07/2021      Patient ID: Jaiml Self is a 65 y.o. male.    Chief Complaint:  Chief Complaint   Patient presents with   • Follow-up       HPI:  Jamil is a pleasant 65-year-old male patient with known coronary artery disease status post coronary artery bypass grafting in the past with LIMA to the LAD and SVG graft to the RCA and SVG graft to the marginal branch of the circumflex artery.  He has coronary artery risk factor significant for diabetes mellitus type 2, current tobacco use, atherogenic dyslipidemia and hypertension who presented to [facility] with chest pain.  Cardiac catheterization revealed severe graft disease in the SVG graft to the right coronary artery for which he underwent percutaneous coronary intervention involving drug-eluting stent placement to the SVG graft to the right coronary artery (4.0 x 33 mm Xience drug-eluting stent).  I personally reviewed his stress test from this hospitalization which showed perfusion abnormality in the inferolateral wall with an ejection fraction of 57%.    Today he presents post PCI is routine follow-up with no complaints from a cardiovascular standpoint.    The following portions of the patient's history were reviewed and updated as appropriate: allergies, current medications, past family history, past medical history, past social history, past surgical history and problem list.    Problem List:  Patient Active Problem List   Diagnosis   • Essential hypertension   • Diabetes mellitus (CMS/[facility])   • Hyperlipidemia   • Tobacco abuse   • CAD (coronary artery disease)   • LIZETTE (obstructive sleep apnea)   • Obesity (BMI 30-39.9)   • CKD (chronic kidney disease) stage 3, GFR 30-59 ml/min (CMS/[facility])       Past Medical History:  No past medical history on file.    Past Surgical History:  Past Surgical History:   Procedure Laterality Date   • CARDIAC CATHETERIZATION N/A 3/21/2021    Procedure: Left Heart Cath;  Surgeon:  "Jourdan Pillai MD;  Location: [facility] CATH INVASIVE LOCATION;  Service: Cardiology;  Laterality: N/A;   • CAROTID STENT         Social History:  Social History     Socioeconomic History   • Marital status:      Spouse name: Not on file   • Number of children: Not on file   • Years of education: Not on file   • Highest education level: Not on file   Tobacco Use   • Smoking status: Current Every Day Smoker     Packs/day: 1.00     Types: Cigarettes   • Smokeless tobacco: Never Used   Substance and Sexual Activity   • Alcohol use: Never   • Drug use: Never       Allergies:  No Known Allergies      Review of Symptoms:  Constitutional: Patient afebrile no chills or unexpected weight changes  Respiratory: No cough, no wheezing or dyspnea  Cardiovascular: Today the patient complains of no chest pain, palpitations, dyspnea, orthopnea and no edema  Gastrointestinal: No nausea, vomiting, constipation or diarrhea.  No melena or dark stools    All other systems reviewed and are negative         Objective:         /70 (BP Location: Left arm, Patient Position: Sitting)   Pulse 98   Resp 18   Ht 180.3 cm (71\")   Wt 126 kg (278 lb)   BMI 38.77 kg/m²     Physical exam  Constitutional: well-nourished, and appears stated age in no acute distress  PERRL: Conjunctiva clear, no pallor, anicteric  HENMT: normocephalic, normal dentition, no cyanosis or pallor  Neck:no bruits, or thrills and bilateral normal carotid upstroke. Normal jugular venous pressure  Cardiovascular: No parasternal heaves an non-displaced focal PMI. Normal rate and rhythm: no rub, gallop, murmur or click and normal S1 and S2; no lower or upper extremity edema.   Lungs: unlabored, no wheezing with no rales or rhonchi on auscultation.  Extremities: Warm, no clubbing, cyanosis. Full and equal peripheral pulses in extremities with no bruits appreciated.   Abdomen: soft, non-tender, non-distended  Musculoskeletal: no joint tenderness or swelling " and no erythema  Skin: Warm and dry, non-erythematous   Neuro:alert and normal affect. Oriented to time, place and person.           In-Office Procedure(s):  Procedures    ASCVD RIsk Score::  The ASCVD Risk score (Kingfisherfadumo SCHILLING Jr., et al., 2013) failed to calculate for the following reasons:    The valid total cholesterol range is 130 to 320 mg/dL    Recent Radiology:  Imaging Results (Most Recent)     None          Lab Review:   Admission on 03/20/2021, Discharged on 03/22/2021   Component Date Value   • QT Interval 03/20/2021 339    • Glucose 03/20/2021 141*   • BUN 03/20/2021 28*   • Creatinine 03/20/2021 1.43*   • Sodium 03/20/2021 140    • Potassium 03/20/2021 3.8    • Chloride 03/20/2021 103    • CO2 03/20/2021 27.0    • Calcium 03/20/2021 9.7    • eGFR Non  Amer 03/20/2021 49*   • BUN/Creatinine Ratio 03/20/2021 19.6    • Anion Gap 03/20/2021 10.0    • Troponin T 03/20/2021 <0.010    • WBC 03/20/2021 10.50    • RBC 03/20/2021 4.61    • Hemoglobin 03/20/2021 14.5    • Hematocrit 03/20/2021 42.4    • MCV 03/20/2021 92.0    • MCH 03/20/2021 31.4    • MCHC 03/20/2021 34.2    • RDW 03/20/2021 13.2    • RDW-SD 03/20/2021 41.6    • MPV 03/20/2021 9.1    • Platelets 03/20/2021 228    • Neutrophil % 03/20/2021 76.6*   • Lymphocyte % 03/20/2021 15.1*   • Monocyte % 03/20/2021 6.4    • Eosinophil % 03/20/2021 1.2    • Basophil % 03/20/2021 0.7    • Neutrophils, Absolute 03/20/2021 8.10*   • Lymphocytes, Absolute 03/20/2021 1.60    • Monocytes, Absolute 03/20/2021 0.70    • Eosinophils, Absolute 03/20/2021 0.10    • Basophils, Absolute 03/20/2021 0.10    • nRBC 03/20/2021 0.0    • BH CV STRESS PROTOCOL 1 03/21/2021 Pharmacologic    • Stage 1 03/21/2021 1    • HR Stage 1 03/21/2021 120    • BP Stage 1 03/21/2021 163/72    • Duration Min Stage 1 03/21/2021 1    • Stress Dose Regadenoson * 03/21/2021 0.4    • Stress Comments Stage 1 03/21/2021 10 sec bolus injection    • Stage 2 03/21/2021 2    • HR Stage 2 03/21/2021 115     • BP Stage 2 03/21/2021 163/72    • Duration Min Stage 2 03/21/2021 1    • Duration Sec Stage 2 03/21/2021 0    • Stage 3 03/21/2021 3    • HR Stage 3 03/21/2021 107    • BP Stage 3 03/21/2021 152/73    • Duration Min Stage 3 03/21/2021 1    • Stage 4 03/21/2021 4    • HR Stage 4 03/21/2021 100    • BP Stage 4 03/21/2021 152/73    • Duration Min Stage 4 03/21/2021 1    • Baseline HR 03/21/2021 89    • Baseline BP 03/21/2021 158/73    • Peak HR 03/21/2021 122    • Percent Max Pred HR 03/21/2021 79.22    • Percent Target HR 03/21/2021 93    • Peak BP 03/21/2021 163/72    • Recovery HR 03/21/2021 90    • Recovery BP 03/21/2021 170/811    • Target HR (85%) 03/21/2021 131    • Max. Pred. HR (100%) 03/21/2021 154    • BH CV REST NUCLEAR ISOTO* 03/21/2021 7.7    • BH CV STRESS NUCLEAR ISO* 03/21/2021 21.6    • Nuc Stress EF 03/21/2021 57    • Troponin T 03/21/2021 <0.010    • Glucose 03/21/2021 89    • BUN 03/21/2021 27*   • Creatinine 03/21/2021 1.33*   • Sodium 03/21/2021 139    • Potassium 03/21/2021 4.2    • Chloride 03/21/2021 105    • CO2 03/21/2021 26.0    • Calcium 03/21/2021 9.2    • eGFR Non African Amer 03/21/2021 54*   • BUN/Creatinine Ratio 03/21/2021 20.3    • Anion Gap 03/21/2021 8.0    • Magnesium 03/21/2021 2.0    • Hemoglobin A1C 03/21/2021 6.2*   • Total Cholesterol 03/21/2021 99    • Triglycerides 03/21/2021 213*   • HDL Cholesterol 03/21/2021 35*   • LDL Cholesterol  03/21/2021 31    • VLDL Cholesterol 03/21/2021 33    • LDL/HDL Ratio 03/21/2021 0.61    • WBC 03/21/2021 9.00    • RBC 03/21/2021 4.45    • Hemoglobin 03/21/2021 13.8    • Hematocrit 03/21/2021 40.8    • MCV 03/21/2021 91.6    • MCH 03/21/2021 31.1    • MCHC 03/21/2021 33.9    • RDW 03/21/2021 13.6    • RDW-SD 03/21/2021 43.8    • MPV 03/21/2021 9.0    • Platelets 03/21/2021 221    • Neutrophil % 03/21/2021 61.7    • Lymphocyte % 03/21/2021 28.0    • Monocyte % 03/21/2021 7.3    • Eosinophil % 03/21/2021 2.1    • Basophil % 03/21/2021  0.9    • Neutrophils, Absolute 03/21/2021 5.60    • Lymphocytes, Absolute 03/21/2021 2.50    • Monocytes, Absolute 03/21/2021 0.70    • Eosinophils, Absolute 03/21/2021 0.20    • Basophils, Absolute 03/21/2021 0.10    • nRBC 03/21/2021 0.1    • COVID19 03/20/2021 Not Detected    • Glucose 03/21/2021 97    • Activated Clotting Time  03/21/2021 257*   • Glucose 03/21/2021 141*   • Glucose 03/22/2021 112*   • BUN 03/22/2021 21    • Creatinine 03/22/2021 1.21    • Sodium 03/22/2021 138    • Potassium 03/22/2021 4.0    • Chloride 03/22/2021 102    • CO2 03/22/2021 24.0    • Calcium 03/22/2021 9.4    • eGFR Non African Amer 03/22/2021 60*   • BUN/Creatinine Ratio 03/22/2021 17.4    • Anion Gap 03/22/2021 12.0    • Magnesium 03/22/2021 2.2    • WBC 03/22/2021 13.20*   • RBC 03/22/2021 4.64    • Hemoglobin 03/22/2021 14.3    • Hematocrit 03/22/2021 42.5    • MCV 03/22/2021 91.5    • MCH 03/22/2021 30.8    • MCHC 03/22/2021 33.6    • RDW 03/22/2021 13.8    • RDW-SD 03/22/2021 44.6    • MPV 03/22/2021 9.0    • Platelets 03/22/2021 218    • Neutrophil % 03/22/2021 81.7*   • Lymphocyte % 03/22/2021 10.5*   • Monocyte % 03/22/2021 6.1    • Eosinophil % 03/22/2021 1.3    • Basophil % 03/22/2021 0.4    • Neutrophils, Absolute 03/22/2021 10.80*   • Lymphocytes, Absolute 03/22/2021 1.40    • Monocytes, Absolute 03/22/2021 0.80    • Eosinophils, Absolute 03/22/2021 0.20    • Basophils, Absolute 03/22/2021 0.10    • nRBC 03/22/2021 0.3*   • QT Interval 03/22/2021 354    • Glucose 03/22/2021 110*   • Glucose 03/22/2021 174*              Invalid input(s): ALKPO4                        Invalid input(s): LDLCALC                Assessment:          Diagnosis Plan   1. Coronary artery disease involving native coronary artery of native heart without angina pectoris     2. Mixed hyperlipidemia     3. Essential hypertension     4. LIZETTE (obstructive sleep apnea)            Plan:         1. Coronary artery disease involving native coronary artery  of native heart without angina pectoris  Clinically silent post PCI and stenting.    2. Mixed hyperlipidemia  Well-controlled on medical therapy    3. Essential hypertension  Well-controlled on medical therapy    4. LIZETTE (obstructive sleep apnea)  Continue CPAP                 Jourdan Pillai MD  04/07/21  .

## 2021-04-12 ENCOUNTER — TELEPHONE (OUTPATIENT)
Dept: CARDIAC REHAB | Facility: HOSPITAL | Age: 66
End: 2021-04-12

## 2021-04-12 NOTE — TELEPHONE ENCOUNTER
Called pt to let him know about insurance verification. Said he would like to wait until he is closer to meeting his deductible.

## 2021-10-13 ENCOUNTER — OFFICE VISIT (OUTPATIENT)
Dept: CARDIOLOGY | Facility: CLINIC | Age: 66
End: 2021-10-13

## 2021-10-13 VITALS
RESPIRATION RATE: 20 BRPM | WEIGHT: 277 LBS | BODY MASS INDEX: 38.78 KG/M2 | DIASTOLIC BLOOD PRESSURE: 80 MMHG | HEART RATE: 64 BPM | HEIGHT: 71 IN | OXYGEN SATURATION: 97 % | SYSTOLIC BLOOD PRESSURE: 150 MMHG

## 2021-10-13 DIAGNOSIS — E08.00 DIABETES MELLITUS DUE TO UNDERLYING CONDITION WITH HYPEROSMOLARITY WITHOUT COMA, WITHOUT LONG-TERM CURRENT USE OF INSULIN (HCC): Chronic | ICD-10-CM

## 2021-10-13 DIAGNOSIS — N18.30 STAGE 3 CHRONIC KIDNEY DISEASE, UNSPECIFIED WHETHER STAGE 3A OR 3B CKD (HCC): Chronic | ICD-10-CM

## 2021-10-13 DIAGNOSIS — E78.2 MIXED HYPERLIPIDEMIA: Chronic | ICD-10-CM

## 2021-10-13 DIAGNOSIS — I25.10 CORONARY ARTERY DISEASE INVOLVING NATIVE CORONARY ARTERY OF NATIVE HEART WITHOUT ANGINA PECTORIS: Primary | Chronic | ICD-10-CM

## 2021-10-13 PROCEDURE — 99214 OFFICE O/P EST MOD 30 MIN: CPT | Performed by: INTERNAL MEDICINE

## 2021-10-13 RX ORDER — CLOPIDOGREL BISULFATE 75 MG/1
75 TABLET ORAL DAILY
Qty: 90 TABLET | Refills: 3 | Status: SHIPPED | OUTPATIENT
Start: 2021-10-13 | End: 2022-06-30 | Stop reason: SDUPTHER

## 2021-10-13 RX ORDER — NEBIVOLOL 10 MG/1
1 TABLET ORAL DAILY
COMMUNITY
Start: 2021-10-04 | End: 2022-08-01

## 2021-10-13 NOTE — PROGRESS NOTES
Subjective:     Encounter Date:10/13/2021      Patient ID: Jamil Self is a 65 y.o. male.    Chief Complaint:  No chief complaint on file.      HPI:  Jamil is a pleasant 65-year-old male patient with known coronary artery disease status post coronary artery bypass grafting in the past with LIMA to the LAD and SVG graft to the RCA and SVG graft to the marginal branch of the circumflex artery.  He has coronary artery risk factor significant for diabetes mellitus type 2, current tobacco use, atherogenic dyslipidemia and hypertension who presented to [facility] with chest pain.  Cardiac catheterization revealed severe graft disease in the SVG graft to the right coronary artery for which he underwent percutaneous coronary intervention involving drug-eluting stent placement to the SVG graft to the right coronary artery (4.0 x 33 mm Xience drug-eluting stent).  I personally reviewed his stress test from this hospitalization which showed perfusion abnormality in the inferolateral wall with an ejection fraction of 57%.    Patient returns today routinely. He has no complaints from a cardiovascular standpoint.    The following portions of the patient's history were reviewed and updated as appropriate: allergies, current medications, past family history, past medical history, past social history, past surgical history and problem list.    Problem List:  Patient Active Problem List   Diagnosis   • Essential hypertension   • Diabetes mellitus (HCC)   • Hyperlipidemia   • Tobacco abuse   • CAD (coronary artery disease)   • LIZETTE (obstructive sleep apnea)   • Obesity (BMI 30-39.9)   • CKD (chronic kidney disease) stage 3, GFR 30-59 ml/min (HCC)       Past Medical History:  No past medical history on file.    Past Surgical History:  Past Surgical History:   Procedure Laterality Date   • CARDIAC CATHETERIZATION N/A 3/21/2021    Procedure: Left Heart Cath;  Surgeon: Jourdan Pillai MD;  Location: [facility]  CATH INVASIVE LOCATION;  Service: Cardiology;  Laterality: N/A;   • CAROTID STENT         Social History:  Social History     Socioeconomic History   • Marital status:    Tobacco Use   • Smoking status: Current Every Day Smoker     Packs/day: 1.00     Types: Cigarettes   • Smokeless tobacco: Never Used   Substance and Sexual Activity   • Alcohol use: Never   • Drug use: Never       Allergies:  No Known Allergies        Review of Symptoms:  Constitutional: Patient afebrile no chills or unexpected weight changes  Respiratory: No cough, no wheezing or dyspnea  Cardiovascular: Today the patient complains of no chest pain, palpitations, dyspnea, orthopnea and no edema  Gastrointestinal: No nausea, vomiting, constipation or diarrhea.  No melena or dark stools    All other systems reviewed and are negative           Objective:         There were no vitals taken for this visit.      Physical exam  Constitutional: well-nourished, and appears stated age in no acute distress  PERRL: Conjunctiva clear, no pallor, anicteric  HENMT: normocephalic, normal dentition, no cyanosis or pallor  Neck:no bruits, or thrills and bilateral normal carotid upstroke. Normal jugular venous pressure  Cardiovascular: No parasternal heaves an non-displaced focal PMI. Normal rate and rhythm: no rub, gallop, murmur or click and normal S1 and S2; no lower or upper extremity edema.   Lungs: unlabored, no wheezing with no rales or rhonchi on auscultation.  Extremities: Warm, no clubbing, cyanosis. Full and equal peripheral pulses in extremities with no bruits appreciated.   Abdomen: soft, non-tender, non-distended  Musculoskeletal: no joint tenderness or swelling and no erythema  Skin: Warm and dry, non-erythematous   Neuro:alert and normal affect. Oriented to time, place and person.       In-Office Procedure(s):  Procedures    ASCVD RIsk Score::  The ASCVD Risk score (Artemio TONIE Jr., et al., 2013) failed to calculate for the following reasons:     The valid total cholesterol range is 130 to 320 mg/dL    Recent Radiology:  Imaging Results (Most Recent)     None          Lab Review:   No visits with results within 2 Month(s) from this visit.   Latest known visit with results is:   Admission on 03/20/2021, Discharged on 03/22/2021   Component Date Value   • QT Interval 03/20/2021 339    • Glucose 03/20/2021 141*   • BUN 03/20/2021 28*   • Creatinine 03/20/2021 1.43*   • Sodium 03/20/2021 140    • Potassium 03/20/2021 3.8    • Chloride 03/20/2021 103    • CO2 03/20/2021 27.0    • Calcium 03/20/2021 9.7    • eGFR Non  Amer 03/20/2021 49*   • BUN/Creatinine Ratio 03/20/2021 19.6    • Anion Gap 03/20/2021 10.0    • Troponin T 03/20/2021 <0.010    • WBC 03/20/2021 10.50    • RBC 03/20/2021 4.61    • Hemoglobin 03/20/2021 14.5    • Hematocrit 03/20/2021 42.4    • MCV 03/20/2021 92.0    • MCH 03/20/2021 31.4    • MCHC 03/20/2021 34.2    • RDW 03/20/2021 13.2    • RDW-SD 03/20/2021 41.6    • MPV 03/20/2021 9.1    • Platelets 03/20/2021 228    • Neutrophil % 03/20/2021 76.6*   • Lymphocyte % 03/20/2021 15.1*   • Monocyte % 03/20/2021 6.4    • Eosinophil % 03/20/2021 1.2    • Basophil % 03/20/2021 0.7    • Neutrophils, Absolute 03/20/2021 8.10*   • Lymphocytes, Absolute 03/20/2021 1.60    • Monocytes, Absolute 03/20/2021 0.70    • Eosinophils, Absolute 03/20/2021 0.10    • Basophils, Absolute 03/20/2021 0.10    • nRBC 03/20/2021 0.0    • BH CV STRESS PROTOCOL 1 03/21/2021 Pharmacologic    • Stage 1 03/21/2021 1    • HR Stage 1 03/21/2021 120    • BP Stage 1 03/21/2021 163/72    • Duration Min Stage 1 03/21/2021 1    • Stress Dose Regadenoson * 03/21/2021 0.4    • Stress Comments Stage 1 03/21/2021 10 sec bolus injection    • Stage 2 03/21/2021 2    • HR Stage 2 03/21/2021 115    • BP Stage 2 03/21/2021 163/72    • Duration Min Stage 2 03/21/2021 1    • Duration Sec Stage 2 03/21/2021 0    • Stage 3 03/21/2021 3    • HR Stage 3 03/21/2021 107    • BP Stage 3  03/21/2021 152/73    • Duration Min Stage 3 03/21/2021 1    • Stage 4 03/21/2021 4    • HR Stage 4 03/21/2021 100    • BP Stage 4 03/21/2021 152/73    • Duration Min Stage 4 03/21/2021 1    • Baseline HR 03/21/2021 89    • Baseline BP 03/21/2021 158/73    • Peak HR 03/21/2021 122    • Percent Max Pred HR 03/21/2021 79.22    • Percent Target HR 03/21/2021 93    • Peak BP 03/21/2021 163/72    • Recovery HR 03/21/2021 90    • Recovery BP 03/21/2021 170/811    • Target HR (85%) 03/21/2021 131    • Max. Pred. HR (100%) 03/21/2021 154    • BH CV REST NUCLEAR ISOTO* 03/21/2021 7.7    • BH CV STRESS NUCLEAR ISO* 03/21/2021 21.6    • Nuc Stress EF 03/21/2021 57    • Troponin T 03/21/2021 <0.010    • Glucose 03/21/2021 89    • BUN 03/21/2021 27*   • Creatinine 03/21/2021 1.33*   • Sodium 03/21/2021 139    • Potassium 03/21/2021 4.2    • Chloride 03/21/2021 105    • CO2 03/21/2021 26.0    • Calcium 03/21/2021 9.2    • eGFR Non African Amer 03/21/2021 54*   • BUN/Creatinine Ratio 03/21/2021 20.3    • Anion Gap 03/21/2021 8.0    • Magnesium 03/21/2021 2.0    • Hemoglobin A1C 03/21/2021 6.2*   • Total Cholesterol 03/21/2021 99    • Triglycerides 03/21/2021 213*   • HDL Cholesterol 03/21/2021 35*   • LDL Cholesterol  03/21/2021 31    • VLDL Cholesterol 03/21/2021 33    • LDL/HDL Ratio 03/21/2021 0.61    • WBC 03/21/2021 9.00    • RBC 03/21/2021 4.45    • Hemoglobin 03/21/2021 13.8    • Hematocrit 03/21/2021 40.8    • MCV 03/21/2021 91.6    • MCH 03/21/2021 31.1    • MCHC 03/21/2021 33.9    • RDW 03/21/2021 13.6    • RDW-SD 03/21/2021 43.8    • MPV 03/21/2021 9.0    • Platelets 03/21/2021 221    • Neutrophil % 03/21/2021 61.7    • Lymphocyte % 03/21/2021 28.0    • Monocyte % 03/21/2021 7.3    • Eosinophil % 03/21/2021 2.1    • Basophil % 03/21/2021 0.9    • Neutrophils, Absolute 03/21/2021 5.60    • Lymphocytes, Absolute 03/21/2021 2.50    • Monocytes, Absolute 03/21/2021 0.70    • Eosinophils, Absolute 03/21/2021 0.20    •  Basophils, Absolute 03/21/2021 0.10    • nRBC 03/21/2021 0.1    • COVID19 03/20/2021 Not Detected    • Glucose 03/21/2021 97    • Activated Clotting Time  03/21/2021 257*   • Glucose 03/21/2021 141*   • Glucose 03/22/2021 112*   • BUN 03/22/2021 21    • Creatinine 03/22/2021 1.21    • Sodium 03/22/2021 138    • Potassium 03/22/2021 4.0    • Chloride 03/22/2021 102    • CO2 03/22/2021 24.0    • Calcium 03/22/2021 9.4    • eGFR Non African Amer 03/22/2021 60*   • BUN/Creatinine Ratio 03/22/2021 17.4    • Anion Gap 03/22/2021 12.0    • Magnesium 03/22/2021 2.2    • WBC 03/22/2021 13.20*   • RBC 03/22/2021 4.64    • Hemoglobin 03/22/2021 14.3    • Hematocrit 03/22/2021 42.5    • MCV 03/22/2021 91.5    • MCH 03/22/2021 30.8    • MCHC 03/22/2021 33.6    • RDW 03/22/2021 13.8    • RDW-SD 03/22/2021 44.6    • MPV 03/22/2021 9.0    • Platelets 03/22/2021 218    • Neutrophil % 03/22/2021 81.7*   • Lymphocyte % 03/22/2021 10.5*   • Monocyte % 03/22/2021 6.1    • Eosinophil % 03/22/2021 1.3    • Basophil % 03/22/2021 0.4    • Neutrophils, Absolute 03/22/2021 10.80*   • Lymphocytes, Absolute 03/22/2021 1.40    • Monocytes, Absolute 03/22/2021 0.80    • Eosinophils, Absolute 03/22/2021 0.20    • Basophils, Absolute 03/22/2021 0.10    • nRBC 03/22/2021 0.3*   • QT Interval 03/22/2021 354    • Glucose 03/22/2021 110*   • Glucose 03/22/2021 174*              Invalid input(s): ALKPO4                        Invalid input(s): LDLCALC                Assessment:          Diagnosis Plan   1. Coronary artery disease involving native coronary artery of native heart without angina pectoris     2. Mixed hyperlipidemia     3. Diabetes mellitus due to underlying condition with hyperosmolarity without coma, without long-term current use of insulin ([facility])     4. Stage 3 chronic kidney disease, unspecified whether stage 3a or 3b CKD ([facility])            Plan:         1. Coronary artery disease involving native coronary artery of native heart without  angina pectoris  Clinically silent. Continue optimize medical therapy secondary prevention    2. Mixed hyperlipidemia  Well-controlled on medical and nutritional therapy    3. Diabetes mellitus due to underlying condition with hyperosmolarity without coma, without long-term current use of insulin (HCC)  Well-controlled on medical therapy    4. Stage 3 chronic kidney disease, unspecified whether stage 3a or 3b CKD (HCC)  Stable                 Jourdan Pillai MD  10/13/21  .

## 2021-10-19 ENCOUNTER — TELEPHONE (OUTPATIENT)
Dept: CARDIOLOGY | Facility: CLINIC | Age: 66
End: 2021-10-19

## 2021-10-19 NOTE — TELEPHONE ENCOUNTER
Freya Groves from [facility] pharmacy would like to know if patient should be on Brilinta or Plavix? States he received a new script for Plavix but patient recently filled Brilinta for a 90 day supply.    CB# [phone]

## 2021-12-21 ENCOUNTER — APPOINTMENT (OUTPATIENT)
Dept: GENERAL RADIOLOGY | Facility: HOSPITAL | Age: 66
End: 2021-12-21

## 2021-12-21 ENCOUNTER — HOSPITAL ENCOUNTER (INPATIENT)
Facility: HOSPITAL | Age: 66
LOS: 4 days | Discharge: HOME | End: 2021-12-26
Attending: EMERGENCY MEDICINE | Admitting: INTERNAL MEDICINE

## 2021-12-21 DIAGNOSIS — J96.01 ACUTE RESPIRATORY FAILURE WITH HYPOXEMIA: ICD-10-CM

## 2021-12-21 DIAGNOSIS — J20.6 ACUTE BRONCHITIS DUE TO RHINOVIRUS: Primary | ICD-10-CM

## 2021-12-21 DIAGNOSIS — Z20.822 LAB TEST NEGATIVE FOR COVID-19 VIRUS: ICD-10-CM

## 2021-12-21 DIAGNOSIS — R09.02 HYPOXIA: ICD-10-CM

## 2021-12-21 LAB
ALBUMIN SERPL-MCNC: 4.2 G/DL (ref 3.5–5.2)
ALBUMIN/GLOB SERPL: 1 G/DL
ALP SERPL-CCNC: 79 U/L (ref 39–117)
ALT SERPL W P-5'-P-CCNC: 24 U/L (ref 1–41)
ANION GAP SERPL CALCULATED.3IONS-SCNC: 11 MMOL/L (ref 5–15)
AST SERPL-CCNC: 14 U/L (ref 1–40)
B PARAPERT DNA SPEC QL NAA+PROBE: NOT DETECTED
B PERT DNA SPEC QL NAA+PROBE: NOT DETECTED
BASOPHILS # BLD AUTO: 0.1 10*3/MM3 (ref 0–0.2)
BASOPHILS NFR BLD AUTO: 0.5 % (ref 0–1.5)
BILIRUB SERPL-MCNC: 0.7 MG/DL (ref 0–1.2)
BUN SERPL-MCNC: 26 MG/DL (ref 8–23)
BUN/CREAT SERPL: 19.1 (ref 7–25)
C PNEUM DNA NPH QL NAA+NON-PROBE: NOT DETECTED
CALCIUM SPEC-SCNC: 9.5 MG/DL (ref 8.6–10.5)
CHLORIDE SERPL-SCNC: 95 MMOL/L (ref 98–107)
CO2 SERPL-SCNC: 26 MMOL/L (ref 22–29)
CREAT SERPL-MCNC: 1.36 MG/DL (ref 0.76–1.27)
DEPRECATED RDW RBC AUTO: 44.6 FL (ref 37–54)
EOSINOPHIL # BLD AUTO: 0 10*3/MM3 (ref 0–0.4)
EOSINOPHIL NFR BLD AUTO: 0.4 % (ref 0.3–6.2)
ERYTHROCYTE [DISTWIDTH] IN BLOOD BY AUTOMATED COUNT: 13.9 % (ref 12.3–15.4)
FLUAV SUBTYP SPEC NAA+PROBE: NOT DETECTED
FLUBV RNA ISLT QL NAA+PROBE: NOT DETECTED
GFR SERPL CREATININE-BSD FRML MDRD: 52 ML/MIN/1.73
GLOBULIN UR ELPH-MCNC: 4.1 GM/DL
GLUCOSE SERPL-MCNC: 110 MG/DL (ref 65–99)
HADV DNA SPEC NAA+PROBE: NOT DETECTED
HCOV 229E RNA SPEC QL NAA+PROBE: NOT DETECTED
HCOV HKU1 RNA SPEC QL NAA+PROBE: NOT DETECTED
HCOV NL63 RNA SPEC QL NAA+PROBE: NOT DETECTED
HCOV OC43 RNA SPEC QL NAA+PROBE: NOT DETECTED
HCT VFR BLD AUTO: 40.6 % (ref 37.5–51)
HGB BLD-MCNC: 13.6 G/DL (ref 13–17.7)
HMPV RNA NPH QL NAA+NON-PROBE: NOT DETECTED
HPIV1 RNA ISLT QL NAA+PROBE: NOT DETECTED
HPIV2 RNA SPEC QL NAA+PROBE: NOT DETECTED
HPIV3 RNA NPH QL NAA+PROBE: NOT DETECTED
HPIV4 P GENE NPH QL NAA+PROBE: NOT DETECTED
LYMPHOCYTES # BLD AUTO: 1.3 10*3/MM3 (ref 0.7–3.1)
LYMPHOCYTES NFR BLD AUTO: 11.1 % (ref 19.6–45.3)
M PNEUMO IGG SER IA-ACNC: NOT DETECTED
MCH RBC QN AUTO: 30.8 PG (ref 26.6–33)
MCHC RBC AUTO-ENTMCNC: 33.6 G/DL (ref 31.5–35.7)
MCV RBC AUTO: 91.7 FL (ref 79–97)
MONOCYTES # BLD AUTO: 1 10*3/MM3 (ref 0.1–0.9)
MONOCYTES NFR BLD AUTO: 9.1 % (ref 5–12)
NEUTROPHILS NFR BLD AUTO: 78.9 % (ref 42.7–76)
NEUTROPHILS NFR BLD AUTO: 9 10*3/MM3 (ref 1.7–7)
NRBC BLD AUTO-RTO: 0 /100 WBC (ref 0–0.2)
NT-PROBNP SERPL-MCNC: 296.2 PG/ML (ref 0–900)
PLATELET # BLD AUTO: 258 10*3/MM3 (ref 140–450)
PMV BLD AUTO: 8.7 FL (ref 6–12)
POTASSIUM SERPL-SCNC: 4.1 MMOL/L (ref 3.5–5.2)
PROCALCITONIN SERPL-MCNC: 0.18 NG/ML (ref 0–0.25)
PROT SERPL-MCNC: 8.3 G/DL (ref 6–8.5)
RBC # BLD AUTO: 4.42 10*6/MM3 (ref 4.14–5.8)
RHINOVIRUS RNA SPEC NAA+PROBE: DETECTED
RSV RNA NPH QL NAA+NON-PROBE: NOT DETECTED
SARS-COV-2 RNA NPH QL NAA+NON-PROBE: NOT DETECTED
SODIUM SERPL-SCNC: 132 MMOL/L (ref 136–145)
TROPONIN T SERPL-MCNC: <0.01 NG/ML (ref 0–0.03)
WBC NRBC COR # BLD: 11.4 10*3/MM3 (ref 3.4–10.8)

## 2021-12-21 PROCEDURE — 93005 ELECTROCARDIOGRAM TRACING: CPT

## 2021-12-21 PROCEDURE — 80061 LIPID PANEL: CPT | Performed by: NURSE PRACTITIONER

## 2021-12-21 PROCEDURE — 85025 COMPLETE CBC W/AUTO DIFF WBC: CPT | Performed by: EMERGENCY MEDICINE

## 2021-12-21 PROCEDURE — 80053 COMPREHEN METABOLIC PANEL: CPT | Performed by: EMERGENCY MEDICINE

## 2021-12-21 PROCEDURE — 84145 PROCALCITONIN (PCT): CPT | Performed by: EMERGENCY MEDICINE

## 2021-12-21 PROCEDURE — 93005 ELECTROCARDIOGRAM TRACING: CPT | Performed by: EMERGENCY MEDICINE

## 2021-12-21 PROCEDURE — 83880 ASSAY OF NATRIURETIC PEPTIDE: CPT | Performed by: EMERGENCY MEDICINE

## 2021-12-21 PROCEDURE — 99284 EMERGENCY DEPT VISIT MOD MDM: CPT

## 2021-12-21 PROCEDURE — 71045 X-RAY EXAM CHEST 1 VIEW: CPT

## 2021-12-21 PROCEDURE — 0202U NFCT DS 22 TRGT SARS-COV-2: CPT

## 2021-12-21 PROCEDURE — 84484 ASSAY OF TROPONIN QUANT: CPT | Performed by: EMERGENCY MEDICINE

## 2021-12-21 PROCEDURE — 83930 ASSAY OF BLOOD OSMOLALITY: CPT | Performed by: NURSE PRACTITIONER

## 2021-12-21 RX ORDER — ALBUTEROL SULFATE 2.5 MG/3ML
2.5 SOLUTION RESPIRATORY (INHALATION) ONCE
Status: COMPLETED | OUTPATIENT
Start: 2021-12-21 | End: 2021-12-22

## 2021-12-21 RX ORDER — METHYLPREDNISOLONE SODIUM SUCCINATE 125 MG/2ML
125 INJECTION, POWDER, LYOPHILIZED, FOR SOLUTION INTRAMUSCULAR; INTRAVENOUS ONCE
Status: COMPLETED | OUTPATIENT
Start: 2021-12-21 | End: 2021-12-22

## 2021-12-22 PROBLEM — J20.6 ACUTE BRONCHITIS DUE TO RHINOVIRUS: Status: ACTIVE | Noted: 2021-12-22

## 2021-12-22 PROBLEM — E11.9 CONTROLLED TYPE 2 DIABETES MELLITUS WITHOUT COMPLICATION, WITHOUT LONG-TERM CURRENT USE OF INSULIN (HCC): Status: ACTIVE | Noted: 2021-01-21

## 2021-12-22 PROBLEM — E87.1 HYPONATREMIA: Status: ACTIVE | Noted: 2021-12-22

## 2021-12-22 PROBLEM — Z95.1 HX OF CABG: Status: ACTIVE | Noted: 2021-12-22

## 2021-12-22 LAB
ANION GAP SERPL CALCULATED.3IONS-SCNC: 12 MMOL/L (ref 5–15)
BASOPHILS # BLD AUTO: 0.1 10*3/MM3 (ref 0–0.2)
BASOPHILS NFR BLD AUTO: 0.5 % (ref 0–1.5)
BUN SERPL-MCNC: 27 MG/DL (ref 8–23)
BUN/CREAT SERPL: 20.6 (ref 7–25)
CALCIUM SPEC-SCNC: 9.1 MG/DL (ref 8.6–10.5)
CHLORIDE SERPL-SCNC: 96 MMOL/L (ref 98–107)
CHOLEST SERPL-MCNC: 97 MG/DL (ref 0–200)
CO2 SERPL-SCNC: 24 MMOL/L (ref 22–29)
CREAT SERPL-MCNC: 1.31 MG/DL (ref 0.76–1.27)
DEPRECATED RDW RBC AUTO: 43.3 FL (ref 37–54)
EOSINOPHIL # BLD AUTO: 0 10*3/MM3 (ref 0–0.4)
EOSINOPHIL NFR BLD AUTO: 0 % (ref 0.3–6.2)
ERYTHROCYTE [DISTWIDTH] IN BLOOD BY AUTOMATED COUNT: 13.7 % (ref 12.3–15.4)
GFR SERPL CREATININE-BSD FRML MDRD: 55 ML/MIN/1.73
GLUCOSE BLDC GLUCOMTR-MCNC: 165 MG/DL (ref 70–105)
GLUCOSE BLDC GLUCOMTR-MCNC: 169 MG/DL (ref 70–105)
GLUCOSE BLDC GLUCOMTR-MCNC: 183 MG/DL (ref 70–105)
GLUCOSE BLDC GLUCOMTR-MCNC: 204 MG/DL (ref 70–105)
GLUCOSE SERPL-MCNC: 149 MG/DL (ref 65–99)
HBA1C MFR BLD: 5.8 % (ref 3.5–5.6)
HCT VFR BLD AUTO: 38.7 % (ref 37.5–51)
HDLC SERPL-MCNC: 39 MG/DL (ref 40–60)
HGB BLD-MCNC: 13 G/DL (ref 13–17.7)
HOLD SPECIMEN: NORMAL
LDLC SERPL CALC-MCNC: 40 MG/DL (ref 0–100)
LDLC/HDLC SERPL: 1.03 {RATIO}
LYMPHOCYTES # BLD AUTO: 0.5 10*3/MM3 (ref 0.7–3.1)
LYMPHOCYTES NFR BLD AUTO: 3.6 % (ref 19.6–45.3)
MCH RBC QN AUTO: 30.8 PG (ref 26.6–33)
MCHC RBC AUTO-ENTMCNC: 33.7 G/DL (ref 31.5–35.7)
MCV RBC AUTO: 91.6 FL (ref 79–97)
MONOCYTES # BLD AUTO: 0.2 10*3/MM3 (ref 0.1–0.9)
MONOCYTES NFR BLD AUTO: 1.4 % (ref 5–12)
NEUTROPHILS NFR BLD AUTO: 11.9 10*3/MM3 (ref 1.7–7)
NEUTROPHILS NFR BLD AUTO: 94.5 % (ref 42.7–76)
NRBC BLD AUTO-RTO: 0 /100 WBC (ref 0–0.2)
OSMOLALITY SERPL: 289 MOSM/KG (ref 280–301)
PLATELET # BLD AUTO: 234 10*3/MM3 (ref 140–450)
PMV BLD AUTO: 9.1 FL (ref 6–12)
POTASSIUM SERPL-SCNC: 4 MMOL/L (ref 3.5–5.2)
RBC # BLD AUTO: 4.23 10*6/MM3 (ref 4.14–5.8)
SODIUM SERPL-SCNC: 132 MMOL/L (ref 136–145)
TRIGL SERPL-MCNC: 89 MG/DL (ref 0–150)
TROPONIN T SERPL-MCNC: <0.01 NG/ML (ref 0–0.03)
VLDLC SERPL-MCNC: 18 MG/DL (ref 5–40)
WBC NRBC COR # BLD: 12.6 10*3/MM3 (ref 3.4–10.8)

## 2021-12-22 PROCEDURE — 25010000002 METHYLPREDNISOLONE PER 125 MG: Performed by: EMERGENCY MEDICINE

## 2021-12-22 PROCEDURE — 25010000002 FUROSEMIDE PER 20 MG: Performed by: INTERNAL MEDICINE

## 2021-12-22 PROCEDURE — 99223 1ST HOSP IP/OBS HIGH 75: CPT | Performed by: INTERNAL MEDICINE

## 2021-12-22 PROCEDURE — G0378 HOSPITAL OBSERVATION PER HR: HCPCS

## 2021-12-22 PROCEDURE — 25010000002 ENOXAPARIN PER 10 MG: Performed by: NURSE PRACTITIONER

## 2021-12-22 PROCEDURE — 94799 UNLISTED PULMONARY SVC/PX: CPT

## 2021-12-22 PROCEDURE — 84484 ASSAY OF TROPONIN QUANT: CPT | Performed by: NURSE PRACTITIONER

## 2021-12-22 PROCEDURE — 36415 COLL VENOUS BLD VENIPUNCTURE: CPT | Performed by: NURSE PRACTITIONER

## 2021-12-22 PROCEDURE — 63710000001 INSULIN LISPRO (HUMAN) PER 5 UNITS: Performed by: NURSE PRACTITIONER

## 2021-12-22 PROCEDURE — 94640 AIRWAY INHALATION TREATMENT: CPT

## 2021-12-22 PROCEDURE — 82962 GLUCOSE BLOOD TEST: CPT

## 2021-12-22 PROCEDURE — 80048 BASIC METABOLIC PNL TOTAL CA: CPT | Performed by: NURSE PRACTITIONER

## 2021-12-22 PROCEDURE — 85025 COMPLETE CBC W/AUTO DIFF WBC: CPT | Performed by: NURSE PRACTITIONER

## 2021-12-22 PROCEDURE — 83036 HEMOGLOBIN GLYCOSYLATED A1C: CPT | Performed by: NURSE PRACTITIONER

## 2021-12-22 RX ORDER — SODIUM CHLORIDE 0.9 % (FLUSH) 0.9 %
10 SYRINGE (ML) INJECTION EVERY 12 HOURS SCHEDULED
Status: DISCONTINUED | OUTPATIENT
Start: 2021-12-22 | End: 2021-12-26 | Stop reason: HOSPADM

## 2021-12-22 RX ORDER — ACETAMINOPHEN 650 MG/1
650 SUPPOSITORY RECTAL EVERY 4 HOURS PRN
Status: DISCONTINUED | OUTPATIENT
Start: 2021-12-22 | End: 2021-12-26 | Stop reason: HOSPADM

## 2021-12-22 RX ORDER — ONDANSETRON 4 MG/1
4 TABLET, FILM COATED ORAL EVERY 6 HOURS PRN
Status: DISCONTINUED | OUTPATIENT
Start: 2021-12-22 | End: 2021-12-26 | Stop reason: HOSPADM

## 2021-12-22 RX ORDER — PREDNISONE 20 MG/1
20 TABLET ORAL
Status: DISCONTINUED | OUTPATIENT
Start: 2021-12-23 | End: 2021-12-26 | Stop reason: HOSPADM

## 2021-12-22 RX ORDER — GUAIFENESIN/DEXTROMETHORPHAN 100-10MG/5
5 SYRUP ORAL EVERY 4 HOURS PRN
Status: DISCONTINUED | OUTPATIENT
Start: 2021-12-22 | End: 2021-12-26 | Stop reason: HOSPADM

## 2021-12-22 RX ORDER — CLOPIDOGREL BISULFATE 75 MG/1
75 TABLET ORAL DAILY
Status: DISCONTINUED | OUTPATIENT
Start: 2021-12-22 | End: 2021-12-26 | Stop reason: HOSPADM

## 2021-12-22 RX ORDER — POTASSIUM CHLORIDE 20 MEQ/1
40 TABLET, EXTENDED RELEASE ORAL AS NEEDED
Status: DISCONTINUED | OUTPATIENT
Start: 2021-12-22 | End: 2021-12-26 | Stop reason: HOSPADM

## 2021-12-22 RX ORDER — IPRATROPIUM BROMIDE AND ALBUTEROL SULFATE 2.5; .5 MG/3ML; MG/3ML
3 SOLUTION RESPIRATORY (INHALATION) EVERY 4 HOURS PRN
Status: DISCONTINUED | OUTPATIENT
Start: 2021-12-22 | End: 2021-12-22 | Stop reason: SDUPTHER

## 2021-12-22 RX ORDER — NICOTINE POLACRILEX 4 MG
15 LOZENGE BUCCAL
Status: DISCONTINUED | OUTPATIENT
Start: 2021-12-22 | End: 2021-12-26 | Stop reason: HOSPADM

## 2021-12-22 RX ORDER — OLANZAPINE 10 MG/2ML
1 INJECTION, POWDER, LYOPHILIZED, FOR SOLUTION INTRAMUSCULAR AS NEEDED
Status: DISCONTINUED | OUTPATIENT
Start: 2021-12-22 | End: 2021-12-26 | Stop reason: HOSPADM

## 2021-12-22 RX ORDER — ALUMINA, MAGNESIA, AND SIMETHICONE 2400; 2400; 240 MG/30ML; MG/30ML; MG/30ML
15 SUSPENSION ORAL EVERY 6 HOURS PRN
Status: DISCONTINUED | OUTPATIENT
Start: 2021-12-22 | End: 2021-12-26 | Stop reason: HOSPADM

## 2021-12-22 RX ORDER — IPRATROPIUM BROMIDE AND ALBUTEROL SULFATE 2.5; .5 MG/3ML; MG/3ML
3 SOLUTION RESPIRATORY (INHALATION) EVERY 4 HOURS PRN
Status: DISCONTINUED | OUTPATIENT
Start: 2021-12-22 | End: 2021-12-26 | Stop reason: HOSPADM

## 2021-12-22 RX ORDER — MAGNESIUM SULFATE HEPTAHYDRATE 40 MG/ML
2 INJECTION, SOLUTION INTRAVENOUS AS NEEDED
Status: DISCONTINUED | OUTPATIENT
Start: 2021-12-22 | End: 2021-12-26 | Stop reason: HOSPADM

## 2021-12-22 RX ORDER — POTASSIUM CHLORIDE 1.5 G/1.77G
40 POWDER, FOR SOLUTION ORAL AS NEEDED
Status: DISCONTINUED | OUTPATIENT
Start: 2021-12-22 | End: 2021-12-26 | Stop reason: HOSPADM

## 2021-12-22 RX ORDER — CHOLECALCIFEROL (VITAMIN D3) 125 MCG
5 CAPSULE ORAL NIGHTLY PRN
Status: DISCONTINUED | OUTPATIENT
Start: 2021-12-22 | End: 2021-12-26 | Stop reason: HOSPADM

## 2021-12-22 RX ORDER — ONDANSETRON 2 MG/ML
4 INJECTION INTRAMUSCULAR; INTRAVENOUS EVERY 6 HOURS PRN
Status: DISCONTINUED | OUTPATIENT
Start: 2021-12-22 | End: 2021-12-26 | Stop reason: HOSPADM

## 2021-12-22 RX ORDER — CALCIUM CARBONATE 200(500)MG
2 TABLET,CHEWABLE ORAL 2 TIMES DAILY PRN
Status: DISCONTINUED | OUTPATIENT
Start: 2021-12-22 | End: 2021-12-26 | Stop reason: HOSPADM

## 2021-12-22 RX ORDER — ALBUTEROL SULFATE 2.5 MG/3ML
2.5 SOLUTION RESPIRATORY (INHALATION)
Status: COMPLETED | OUTPATIENT
Start: 2021-12-22 | End: 2021-12-22

## 2021-12-22 RX ORDER — NITROGLYCERIN 0.4 MG/1
0.4 TABLET SUBLINGUAL
Status: DISCONTINUED | OUTPATIENT
Start: 2021-12-22 | End: 2021-12-22

## 2021-12-22 RX ORDER — METHYLPREDNISOLONE SODIUM SUCCINATE 125 MG/2ML
60 INJECTION, POWDER, LYOPHILIZED, FOR SOLUTION INTRAMUSCULAR; INTRAVENOUS EVERY 12 HOURS
Status: DISCONTINUED | OUTPATIENT
Start: 2021-12-22 | End: 2021-12-22

## 2021-12-22 RX ORDER — ASPIRIN 81 MG/1
81 TABLET ORAL DAILY
Status: DISCONTINUED | OUTPATIENT
Start: 2021-12-22 | End: 2021-12-26 | Stop reason: HOSPADM

## 2021-12-22 RX ORDER — ATORVASTATIN CALCIUM 20 MG/1
20 TABLET, FILM COATED ORAL NIGHTLY
Status: DISCONTINUED | OUTPATIENT
Start: 2021-12-22 | End: 2021-12-26 | Stop reason: HOSPADM

## 2021-12-22 RX ORDER — ACETAMINOPHEN 325 MG/1
650 TABLET ORAL EVERY 4 HOURS PRN
Status: DISCONTINUED | OUTPATIENT
Start: 2021-12-22 | End: 2021-12-26 | Stop reason: HOSPADM

## 2021-12-22 RX ORDER — AMLODIPINE BESYLATE 5 MG/1
10 TABLET ORAL DAILY
Status: DISCONTINUED | OUTPATIENT
Start: 2021-12-22 | End: 2021-12-26 | Stop reason: HOSPADM

## 2021-12-22 RX ORDER — DEXTROSE MONOHYDRATE 25 G/50ML
25 INJECTION, SOLUTION INTRAVENOUS
Status: DISCONTINUED | OUTPATIENT
Start: 2021-12-22 | End: 2021-12-26 | Stop reason: HOSPADM

## 2021-12-22 RX ORDER — INSULIN LISPRO 100 [IU]/ML
0-9 INJECTION, SOLUTION INTRAVENOUS; SUBCUTANEOUS AS NEEDED
Status: DISCONTINUED | OUTPATIENT
Start: 2021-12-22 | End: 2021-12-26 | Stop reason: HOSPADM

## 2021-12-22 RX ORDER — SODIUM CHLORIDE 9 MG/ML
100 INJECTION, SOLUTION INTRAVENOUS CONTINUOUS
Status: DISCONTINUED | OUTPATIENT
Start: 2021-12-22 | End: 2021-12-22

## 2021-12-22 RX ORDER — ACETAMINOPHEN 160 MG/5ML
650 SOLUTION ORAL EVERY 4 HOURS PRN
Status: DISCONTINUED | OUTPATIENT
Start: 2021-12-22 | End: 2021-12-26 | Stop reason: HOSPADM

## 2021-12-22 RX ORDER — INSULIN LISPRO 100 [IU]/ML
0-9 INJECTION, SOLUTION INTRAVENOUS; SUBCUTANEOUS
Status: DISCONTINUED | OUTPATIENT
Start: 2021-12-22 | End: 2021-12-26 | Stop reason: HOSPADM

## 2021-12-22 RX ORDER — MAGNESIUM SULFATE 1 G/100ML
1 INJECTION INTRAVENOUS AS NEEDED
Status: DISCONTINUED | OUTPATIENT
Start: 2021-12-22 | End: 2021-12-26 | Stop reason: HOSPADM

## 2021-12-22 RX ORDER — FUROSEMIDE 10 MG/ML
40 INJECTION INTRAMUSCULAR; INTRAVENOUS ONCE
Status: COMPLETED | OUTPATIENT
Start: 2021-12-22 | End: 2021-12-22

## 2021-12-22 RX ORDER — LOSARTAN POTASSIUM 50 MG/1
100 TABLET ORAL DAILY
Status: DISCONTINUED | OUTPATIENT
Start: 2021-12-22 | End: 2021-12-23

## 2021-12-22 RX ORDER — BENZONATATE 100 MG/1
100 CAPSULE ORAL 3 TIMES DAILY PRN
Status: DISCONTINUED | OUTPATIENT
Start: 2021-12-22 | End: 2021-12-26 | Stop reason: HOSPADM

## 2021-12-22 RX ORDER — NEBIVOLOL 10 MG/1
10 TABLET ORAL DAILY
Status: DISCONTINUED | OUTPATIENT
Start: 2021-12-22 | End: 2021-12-26 | Stop reason: HOSPADM

## 2021-12-22 RX ORDER — GLIPIZIDE 5 MG/1
5 TABLET ORAL
Status: DISCONTINUED | OUTPATIENT
Start: 2021-12-23 | End: 2021-12-26 | Stop reason: HOSPADM

## 2021-12-22 RX ORDER — SODIUM CHLORIDE 0.9 % (FLUSH) 0.9 %
10 SYRINGE (ML) INJECTION AS NEEDED
Status: DISCONTINUED | OUTPATIENT
Start: 2021-12-22 | End: 2021-12-26 | Stop reason: HOSPADM

## 2021-12-22 RX ADMIN — SODIUM CHLORIDE, PRESERVATIVE FREE 10 ML: 5 INJECTION INTRAVENOUS at 08:21

## 2021-12-22 RX ADMIN — INSULIN LISPRO 2 UNITS: 100 INJECTION, SOLUTION INTRAVENOUS; SUBCUTANEOUS at 16:30

## 2021-12-22 RX ADMIN — FUROSEMIDE 40 MG: 10 INJECTION, SOLUTION INTRAMUSCULAR; INTRAVENOUS at 17:10

## 2021-12-22 RX ADMIN — SODIUM CHLORIDE, PRESERVATIVE FREE 10 ML: 5 INJECTION INTRAVENOUS at 20:29

## 2021-12-22 RX ADMIN — ATORVASTATIN CALCIUM 20 MG: 20 TABLET, FILM COATED ORAL at 20:29

## 2021-12-22 RX ADMIN — INSULIN LISPRO 2 UNITS: 100 INJECTION, SOLUTION INTRAVENOUS; SUBCUTANEOUS at 08:20

## 2021-12-22 RX ADMIN — Medication 5 MG: at 20:29

## 2021-12-22 RX ADMIN — INSULIN LISPRO 2 UNITS: 100 INJECTION, SOLUTION INTRAVENOUS; SUBCUTANEOUS at 11:29

## 2021-12-22 RX ADMIN — METHYLPREDNISOLONE SODIUM SUCCINATE 125 MG: 125 INJECTION, POWDER, FOR SOLUTION INTRAMUSCULAR; INTRAVENOUS at 00:10

## 2021-12-22 RX ADMIN — ALBUTEROL SULFATE 2.5 MG: 2.5 SOLUTION RESPIRATORY (INHALATION) at 01:53

## 2021-12-22 RX ADMIN — IPRATROPIUM BROMIDE AND ALBUTEROL SULFATE 3 ML: 2.5; .5 SOLUTION RESPIRATORY (INHALATION) at 17:22

## 2021-12-22 RX ADMIN — NEBIVOLOL 10 MG: 10 TABLET ORAL at 09:46

## 2021-12-22 RX ADMIN — ALBUTEROL SULFATE 2.5 MG: 2.5 SOLUTION RESPIRATORY (INHALATION) at 00:33

## 2021-12-22 RX ADMIN — LOSARTAN POTASSIUM 100 MG: 50 TABLET, FILM COATED ORAL at 15:23

## 2021-12-22 RX ADMIN — Medication 81 MG: at 09:46

## 2021-12-22 RX ADMIN — ALBUTEROL SULFATE 2.5 MG: 2.5 SOLUTION RESPIRATORY (INHALATION) at 01:46

## 2021-12-22 RX ADMIN — AMLODIPINE BESYLATE 10 MG: 5 TABLET ORAL at 09:46

## 2021-12-22 RX ADMIN — ENOXAPARIN SODIUM 40 MG: 40 INJECTION SUBCUTANEOUS at 15:05

## 2021-12-22 RX ADMIN — SODIUM CHLORIDE 100 ML/HR: 9 INJECTION, SOLUTION INTRAVENOUS at 09:46

## 2021-12-22 RX ADMIN — CLOPIDOGREL BISULFATE 75 MG: 75 TABLET, FILM COATED ORAL at 09:46

## 2021-12-23 ENCOUNTER — APPOINTMENT (OUTPATIENT)
Dept: ULTRASOUND IMAGING | Facility: HOSPITAL | Age: 66
End: 2021-12-23

## 2021-12-23 LAB
ANION GAP SERPL CALCULATED.3IONS-SCNC: 14 MMOL/L (ref 5–15)
BASOPHILS # BLD AUTO: 0.2 10*3/MM3 (ref 0–0.2)
BASOPHILS NFR BLD AUTO: 1.2 % (ref 0–1.5)
BUN SERPL-MCNC: 40 MG/DL (ref 8–23)
BUN/CREAT SERPL: 26 (ref 7–25)
CALCIUM SPEC-SCNC: 9.1 MG/DL (ref 8.6–10.5)
CHLORIDE SERPL-SCNC: 102 MMOL/L (ref 98–107)
CO2 SERPL-SCNC: 24 MMOL/L (ref 22–29)
CREAT SERPL-MCNC: 1.54 MG/DL (ref 0.76–1.27)
DEPRECATED RDW RBC AUTO: 43.8 FL (ref 37–54)
EOSINOPHIL # BLD AUTO: 0 10*3/MM3 (ref 0–0.4)
EOSINOPHIL NFR BLD AUTO: 0.1 % (ref 0.3–6.2)
ERYTHROCYTE [DISTWIDTH] IN BLOOD BY AUTOMATED COUNT: 13.5 % (ref 12.3–15.4)
GFR SERPL CREATININE-BSD FRML MDRD: 45 ML/MIN/1.73
GLUCOSE BLDC GLUCOMTR-MCNC: 110 MG/DL (ref 70–105)
GLUCOSE BLDC GLUCOMTR-MCNC: 140 MG/DL (ref 70–105)
GLUCOSE BLDC GLUCOMTR-MCNC: 153 MG/DL (ref 70–105)
GLUCOSE BLDC GLUCOMTR-MCNC: 72 MG/DL (ref 70–105)
GLUCOSE SERPL-MCNC: 129 MG/DL (ref 65–99)
HCT VFR BLD AUTO: 39.3 % (ref 37.5–51)
HGB BLD-MCNC: 13 G/DL (ref 13–17.7)
LYMPHOCYTES # BLD AUTO: 1.3 10*3/MM3 (ref 0.7–3.1)
LYMPHOCYTES NFR BLD AUTO: 8.7 % (ref 19.6–45.3)
MCH RBC QN AUTO: 30.8 PG (ref 26.6–33)
MCHC RBC AUTO-ENTMCNC: 33.2 G/DL (ref 31.5–35.7)
MCV RBC AUTO: 92.8 FL (ref 79–97)
MONOCYTES # BLD AUTO: 1.1 10*3/MM3 (ref 0.1–0.9)
MONOCYTES NFR BLD AUTO: 7.2 % (ref 5–12)
NEUTROPHILS NFR BLD AUTO: 12.7 10*3/MM3 (ref 1.7–7)
NEUTROPHILS NFR BLD AUTO: 82.8 % (ref 42.7–76)
NRBC BLD AUTO-RTO: 0 /100 WBC (ref 0–0.2)
PLATELET # BLD AUTO: 267 10*3/MM3 (ref 140–450)
PMV BLD AUTO: 9.4 FL (ref 6–12)
POTASSIUM SERPL-SCNC: 4.1 MMOL/L (ref 3.5–5.2)
RBC # BLD AUTO: 4.23 10*6/MM3 (ref 4.14–5.8)
SODIUM SERPL-SCNC: 140 MMOL/L (ref 136–145)
WBC NRBC COR # BLD: 15.4 10*3/MM3 (ref 3.4–10.8)

## 2021-12-23 PROCEDURE — 94618 PULMONARY STRESS TESTING: CPT

## 2021-12-23 PROCEDURE — 63710000001 PREDNISONE PER 1 MG: Performed by: INTERNAL MEDICINE

## 2021-12-23 PROCEDURE — 85025 COMPLETE CBC W/AUTO DIFF WBC: CPT | Performed by: NURSE PRACTITIONER

## 2021-12-23 PROCEDURE — 25010000002 ENOXAPARIN PER 10 MG: Performed by: NURSE PRACTITIONER

## 2021-12-23 PROCEDURE — 94799 UNLISTED PULMONARY SVC/PX: CPT

## 2021-12-23 PROCEDURE — G0378 HOSPITAL OBSERVATION PER HR: HCPCS

## 2021-12-23 PROCEDURE — 76775 US EXAM ABDO BACK WALL LIM: CPT

## 2021-12-23 PROCEDURE — 80048 BASIC METABOLIC PNL TOTAL CA: CPT | Performed by: NURSE PRACTITIONER

## 2021-12-23 PROCEDURE — 82962 GLUCOSE BLOOD TEST: CPT

## 2021-12-23 PROCEDURE — 99233 SBSQ HOSP IP/OBS HIGH 50: CPT | Performed by: INTERNAL MEDICINE

## 2021-12-23 PROCEDURE — 36415 COLL VENOUS BLD VENIPUNCTURE: CPT | Performed by: NURSE PRACTITIONER

## 2021-12-23 RX ORDER — SODIUM CHLORIDE 9 MG/ML
75 INJECTION, SOLUTION INTRAVENOUS CONTINUOUS
Status: DISCONTINUED | OUTPATIENT
Start: 2021-12-23 | End: 2021-12-26 | Stop reason: HOSPADM

## 2021-12-23 RX ADMIN — GLIPIZIDE 5 MG: 5 TABLET ORAL at 09:12

## 2021-12-23 RX ADMIN — SODIUM CHLORIDE, PRESERVATIVE FREE 10 ML: 5 INJECTION INTRAVENOUS at 21:51

## 2021-12-23 RX ADMIN — Medication 81 MG: at 09:12

## 2021-12-23 RX ADMIN — CLOPIDOGREL BISULFATE 75 MG: 75 TABLET, FILM COATED ORAL at 09:12

## 2021-12-23 RX ADMIN — SODIUM CHLORIDE 75 ML/HR: 9 INJECTION, SOLUTION INTRAVENOUS at 12:08

## 2021-12-23 RX ADMIN — ATORVASTATIN CALCIUM 20 MG: 20 TABLET, FILM COATED ORAL at 21:51

## 2021-12-23 RX ADMIN — SODIUM CHLORIDE, PRESERVATIVE FREE 10 ML: 5 INJECTION INTRAVENOUS at 09:13

## 2021-12-23 RX ADMIN — NEBIVOLOL 10 MG: 10 TABLET ORAL at 09:12

## 2021-12-23 RX ADMIN — LOSARTAN POTASSIUM 100 MG: 50 TABLET, FILM COATED ORAL at 09:12

## 2021-12-23 RX ADMIN — ENOXAPARIN SODIUM 40 MG: 40 INJECTION SUBCUTANEOUS at 17:18

## 2021-12-23 RX ADMIN — AMLODIPINE BESYLATE 10 MG: 5 TABLET ORAL at 09:12

## 2021-12-23 RX ADMIN — PREDNISONE 20 MG: 20 TABLET ORAL at 09:12

## 2021-12-23 RX ADMIN — BENZONATATE 100 MG: 100 CAPSULE ORAL at 09:15

## 2021-12-24 LAB
ANION GAP SERPL CALCULATED.3IONS-SCNC: 10 MMOL/L (ref 5–15)
BUN SERPL-MCNC: 34 MG/DL (ref 8–23)
BUN/CREAT SERPL: 25.8 (ref 7–25)
CALCIUM SPEC-SCNC: 9 MG/DL (ref 8.6–10.5)
CHLORIDE SERPL-SCNC: 105 MMOL/L (ref 98–107)
CO2 SERPL-SCNC: 25 MMOL/L (ref 22–29)
CREAT SERPL-MCNC: 1.32 MG/DL (ref 0.76–1.27)
DEPRECATED RDW RBC AUTO: 45.5 FL (ref 37–54)
ERYTHROCYTE [DISTWIDTH] IN BLOOD BY AUTOMATED COUNT: 14 % (ref 12.3–15.4)
GFR SERPL CREATININE-BSD FRML MDRD: 54 ML/MIN/1.73
GLUCOSE BLDC GLUCOMTR-MCNC: 102 MG/DL (ref 70–105)
GLUCOSE BLDC GLUCOMTR-MCNC: 104 MG/DL (ref 70–105)
GLUCOSE BLDC GLUCOMTR-MCNC: 114 MG/DL (ref 70–105)
GLUCOSE BLDC GLUCOMTR-MCNC: 185 MG/DL (ref 70–105)
GLUCOSE SERPL-MCNC: 100 MG/DL (ref 65–99)
HCT VFR BLD AUTO: 38.1 % (ref 37.5–51)
HGB BLD-MCNC: 12.8 G/DL (ref 13–17.7)
LYMPHOCYTES # BLD MANUAL: 1.31 10*3/MM3 (ref 0.7–3.1)
LYMPHOCYTES NFR BLD MANUAL: 8 % (ref 5–12)
MCH RBC QN AUTO: 31.4 PG (ref 26.6–33)
MCHC RBC AUTO-ENTMCNC: 33.6 G/DL (ref 31.5–35.7)
MCV RBC AUTO: 93.4 FL (ref 79–97)
METAMYELOCYTES NFR BLD MANUAL: 1 % (ref 0–0)
MONOCYTES # BLD: 1.16 10*3/MM3 (ref 0.1–0.9)
NEUTROPHILS # BLD AUTO: 11.89 10*3/MM3 (ref 1.7–7)
NEUTROPHILS NFR BLD MANUAL: 73 % (ref 42.7–76)
NEUTS BAND NFR BLD MANUAL: 9 % (ref 0–5)
NEUTS VAC BLD QL SMEAR: ABNORMAL
OSMOLALITY UR: 731 MOSM/KG (ref 300–800)
PLAT MORPH BLD: NORMAL
PLATELET # BLD AUTO: 278 10*3/MM3 (ref 140–450)
PMV BLD AUTO: 9.5 FL (ref 6–12)
POTASSIUM SERPL-SCNC: 4.6 MMOL/L (ref 3.5–5.2)
QT INTERVAL: 378 MS
RBC # BLD AUTO: 4.08 10*6/MM3 (ref 4.14–5.8)
RBC MORPH BLD: NORMAL
SCAN SLIDE: NORMAL
SODIUM SERPL-SCNC: 140 MMOL/L (ref 136–145)
VARIANT LYMPHS NFR BLD MANUAL: 1 % (ref 0–5)
VARIANT LYMPHS NFR BLD MANUAL: 8 % (ref 19.6–45.3)
WBC NRBC COR # BLD: 14.5 10*3/MM3 (ref 3.4–10.8)

## 2021-12-24 PROCEDURE — 99232 SBSQ HOSP IP/OBS MODERATE 35: CPT | Performed by: INTERNAL MEDICINE

## 2021-12-24 PROCEDURE — 82962 GLUCOSE BLOOD TEST: CPT

## 2021-12-24 PROCEDURE — 63710000001 PREDNISONE PER 1 MG: Performed by: INTERNAL MEDICINE

## 2021-12-24 PROCEDURE — 36415 COLL VENOUS BLD VENIPUNCTURE: CPT | Performed by: NURSE PRACTITIONER

## 2021-12-24 PROCEDURE — 85025 COMPLETE CBC W/AUTO DIFF WBC: CPT | Performed by: NURSE PRACTITIONER

## 2021-12-24 PROCEDURE — 94799 UNLISTED PULMONARY SVC/PX: CPT

## 2021-12-24 PROCEDURE — 94760 N-INVAS EAR/PLS OXIMETRY 1: CPT

## 2021-12-24 PROCEDURE — 80048 BASIC METABOLIC PNL TOTAL CA: CPT | Performed by: NURSE PRACTITIONER

## 2021-12-24 PROCEDURE — 83935 ASSAY OF URINE OSMOLALITY: CPT | Performed by: NURSE PRACTITIONER

## 2021-12-24 PROCEDURE — 25010000002 ENOXAPARIN PER 10 MG: Performed by: NURSE PRACTITIONER

## 2021-12-24 PROCEDURE — 63710000001 INSULIN LISPRO (HUMAN) PER 5 UNITS: Performed by: NURSE PRACTITIONER

## 2021-12-24 PROCEDURE — 85007 BL SMEAR W/DIFF WBC COUNT: CPT | Performed by: NURSE PRACTITIONER

## 2021-12-24 RX ADMIN — Medication 81 MG: at 08:36

## 2021-12-24 RX ADMIN — IPRATROPIUM BROMIDE AND ALBUTEROL SULFATE 3 ML: 2.5; .5 SOLUTION RESPIRATORY (INHALATION) at 09:07

## 2021-12-24 RX ADMIN — ATORVASTATIN CALCIUM 20 MG: 20 TABLET, FILM COATED ORAL at 21:04

## 2021-12-24 RX ADMIN — INSULIN LISPRO 2 UNITS: 100 INJECTION, SOLUTION INTRAVENOUS; SUBCUTANEOUS at 16:31

## 2021-12-24 RX ADMIN — SODIUM CHLORIDE, PRESERVATIVE FREE 10 ML: 5 INJECTION INTRAVENOUS at 08:37

## 2021-12-24 RX ADMIN — CLOPIDOGREL BISULFATE 75 MG: 75 TABLET, FILM COATED ORAL at 08:36

## 2021-12-24 RX ADMIN — SODIUM CHLORIDE, PRESERVATIVE FREE 10 ML: 5 INJECTION INTRAVENOUS at 21:04

## 2021-12-24 RX ADMIN — SODIUM CHLORIDE 75 ML/HR: 9 INJECTION, SOLUTION INTRAVENOUS at 16:27

## 2021-12-24 RX ADMIN — IPRATROPIUM BROMIDE AND ALBUTEROL SULFATE 3 ML: 2.5; .5 SOLUTION RESPIRATORY (INHALATION) at 13:07

## 2021-12-24 RX ADMIN — Medication 5 MG: at 21:04

## 2021-12-24 RX ADMIN — NEBIVOLOL 10 MG: 10 TABLET ORAL at 08:36

## 2021-12-24 RX ADMIN — PREDNISONE 20 MG: 20 TABLET ORAL at 08:36

## 2021-12-24 RX ADMIN — ENOXAPARIN SODIUM 40 MG: 40 INJECTION SUBCUTANEOUS at 16:24

## 2021-12-24 RX ADMIN — SODIUM CHLORIDE 75 ML/HR: 9 INJECTION, SOLUTION INTRAVENOUS at 01:55

## 2021-12-24 RX ADMIN — AMLODIPINE BESYLATE 10 MG: 5 TABLET ORAL at 08:36

## 2021-12-24 RX ADMIN — GLIPIZIDE 5 MG: 5 TABLET ORAL at 08:36

## 2021-12-25 LAB
ANION GAP SERPL CALCULATED.3IONS-SCNC: 9 MMOL/L (ref 5–15)
BASOPHILS # BLD MANUAL: 0.11 10*3/MM3 (ref 0–0.2)
BASOPHILS NFR BLD MANUAL: 1 % (ref 0–1.5)
BUN SERPL-MCNC: 32 MG/DL (ref 8–23)
BUN/CREAT SERPL: 25.2 (ref 7–25)
CALCIUM SPEC-SCNC: 9.1 MG/DL (ref 8.6–10.5)
CHLORIDE SERPL-SCNC: 105 MMOL/L (ref 98–107)
CO2 SERPL-SCNC: 27 MMOL/L (ref 22–29)
CREAT SERPL-MCNC: 1.27 MG/DL (ref 0.76–1.27)
DEPRECATED RDW RBC AUTO: 44.6 FL (ref 37–54)
ERYTHROCYTE [DISTWIDTH] IN BLOOD BY AUTOMATED COUNT: 13.8 % (ref 12.3–15.4)
GFR SERPL CREATININE-BSD FRML MDRD: 57 ML/MIN/1.73
GLUCOSE BLDC GLUCOMTR-MCNC: 107 MG/DL (ref 70–105)
GLUCOSE BLDC GLUCOMTR-MCNC: 118 MG/DL (ref 70–105)
GLUCOSE BLDC GLUCOMTR-MCNC: 207 MG/DL (ref 70–105)
GLUCOSE BLDC GLUCOMTR-MCNC: 69 MG/DL (ref 70–105)
GLUCOSE SERPL-MCNC: 96 MG/DL (ref 65–99)
HCT VFR BLD AUTO: 35.4 % (ref 37.5–51)
HGB BLD-MCNC: 11.9 G/DL (ref 13–17.7)
LARGE PLATELETS: ABNORMAL
LYMPHOCYTES # BLD MANUAL: 2.09 10*3/MM3 (ref 0.7–3.1)
LYMPHOCYTES NFR BLD MANUAL: 7 % (ref 5–12)
MCH RBC QN AUTO: 31.1 PG (ref 26.6–33)
MCHC RBC AUTO-ENTMCNC: 33.5 G/DL (ref 31.5–35.7)
MCV RBC AUTO: 93 FL (ref 79–97)
MONOCYTES # BLD: 0.77 10*3/MM3 (ref 0.1–0.9)
NEUTROPHILS # BLD AUTO: 8.03 10*3/MM3 (ref 1.7–7)
NEUTROPHILS NFR BLD MANUAL: 67 % (ref 42.7–76)
NEUTS BAND NFR BLD MANUAL: 6 % (ref 0–5)
PLATELET # BLD AUTO: 268 10*3/MM3 (ref 140–450)
PMV BLD AUTO: 9.1 FL (ref 6–12)
POIKILOCYTOSIS BLD QL SMEAR: ABNORMAL
POTASSIUM SERPL-SCNC: 5.2 MMOL/L (ref 3.5–5.2)
RBC # BLD AUTO: 3.81 10*6/MM3 (ref 4.14–5.8)
SCAN SLIDE: NORMAL
SODIUM SERPL-SCNC: 141 MMOL/L (ref 136–145)
VARIANT LYMPHS NFR BLD MANUAL: 19 % (ref 19.6–45.3)
WBC MORPH BLD: NORMAL
WBC NRBC COR # BLD: 11 10*3/MM3 (ref 3.4–10.8)

## 2021-12-25 PROCEDURE — 63710000001 PREDNISONE PER 1 MG: Performed by: INTERNAL MEDICINE

## 2021-12-25 PROCEDURE — 85007 BL SMEAR W/DIFF WBC COUNT: CPT | Performed by: INTERNAL MEDICINE

## 2021-12-25 PROCEDURE — 82962 GLUCOSE BLOOD TEST: CPT

## 2021-12-25 PROCEDURE — 99232 SBSQ HOSP IP/OBS MODERATE 35: CPT | Performed by: INTERNAL MEDICINE

## 2021-12-25 PROCEDURE — 94799 UNLISTED PULMONARY SVC/PX: CPT

## 2021-12-25 PROCEDURE — 80048 BASIC METABOLIC PNL TOTAL CA: CPT | Performed by: INTERNAL MEDICINE

## 2021-12-25 PROCEDURE — 85025 COMPLETE CBC W/AUTO DIFF WBC: CPT | Performed by: INTERNAL MEDICINE

## 2021-12-25 PROCEDURE — 25010000002 ENOXAPARIN PER 10 MG: Performed by: NURSE PRACTITIONER

## 2021-12-25 RX ORDER — CALCIUM CARBONATE 200(500)MG
2 TABLET,CHEWABLE ORAL 2 TIMES DAILY PRN
Qty: 30 TABLET | Refills: 0 | Status: SHIPPED | OUTPATIENT
Start: 2021-12-25 | End: 2022-08-01

## 2021-12-25 RX ORDER — ASPIRIN 81 MG/1
81 TABLET ORAL DAILY
Qty: 30 TABLET | Refills: 0 | Status: SHIPPED | OUTPATIENT
Start: 2021-12-25

## 2021-12-25 RX ORDER — GLIPIZIDE 5 MG/1
5 TABLET ORAL
Qty: 30 TABLET | Refills: 0 | Status: SHIPPED | OUTPATIENT
Start: 2021-12-25 | End: 2022-09-06

## 2021-12-25 RX ORDER — BENZONATATE 100 MG/1
100 CAPSULE ORAL 3 TIMES DAILY PRN
Qty: 30 CAPSULE | Refills: 0 | Status: SHIPPED | OUTPATIENT
Start: 2021-12-25 | End: 2022-08-01

## 2021-12-25 RX ORDER — GUAIFENESIN DEXTROMETHORPHAN HYDROBROMIDE ORAL SOLUTION 10; 100 MG/5ML; MG/5ML
5 SOLUTION ORAL EVERY 4 HOURS PRN
Qty: 118 ML | Refills: 0 | Status: SHIPPED | OUTPATIENT
Start: 2021-12-25 | End: 2022-08-01

## 2021-12-25 RX ORDER — IPRATROPIUM/ALBUTEROL SULFATE 20-100 MCG
1 MIST INHALER (GRAM) INHALATION 4 TIMES DAILY PRN
Qty: 1 EACH | Refills: 0 | Status: SHIPPED | OUTPATIENT
Start: 2021-12-25 | End: 2022-08-01

## 2021-12-25 RX ADMIN — Medication 81 MG: at 09:15

## 2021-12-25 RX ADMIN — SODIUM CHLORIDE, PRESERVATIVE FREE 10 ML: 5 INJECTION INTRAVENOUS at 09:17

## 2021-12-25 RX ADMIN — CLOPIDOGREL BISULFATE 75 MG: 75 TABLET, FILM COATED ORAL at 09:15

## 2021-12-25 RX ADMIN — ATORVASTATIN CALCIUM 20 MG: 20 TABLET, FILM COATED ORAL at 21:57

## 2021-12-25 RX ADMIN — PREDNISONE 20 MG: 20 TABLET ORAL at 09:15

## 2021-12-25 RX ADMIN — NEBIVOLOL 10 MG: 10 TABLET ORAL at 09:15

## 2021-12-25 RX ADMIN — IPRATROPIUM BROMIDE AND ALBUTEROL SULFATE 3 ML: 2.5; .5 SOLUTION RESPIRATORY (INHALATION) at 19:19

## 2021-12-25 RX ADMIN — AMLODIPINE BESYLATE 10 MG: 5 TABLET ORAL at 09:15

## 2021-12-25 RX ADMIN — SODIUM CHLORIDE, PRESERVATIVE FREE 10 ML: 5 INJECTION INTRAVENOUS at 21:57

## 2021-12-25 RX ADMIN — GLIPIZIDE 5 MG: 5 TABLET ORAL at 09:15

## 2021-12-25 RX ADMIN — ENOXAPARIN SODIUM 40 MG: 40 INJECTION SUBCUTANEOUS at 15:41

## 2021-12-25 RX ADMIN — IPRATROPIUM BROMIDE AND ALBUTEROL SULFATE 3 ML: 2.5; .5 SOLUTION RESPIRATORY (INHALATION) at 11:03

## 2021-12-26 ENCOUNTER — READMISSION MANAGEMENT (OUTPATIENT)
Dept: CALL CENTER | Facility: HOSPITAL | Age: 66
End: 2021-12-26

## 2021-12-26 VITALS
BODY MASS INDEX: 35.73 KG/M2 | SYSTOLIC BLOOD PRESSURE: 131 MMHG | TEMPERATURE: 98.6 F | WEIGHT: 263.8 LBS | HEIGHT: 72 IN | DIASTOLIC BLOOD PRESSURE: 74 MMHG | RESPIRATION RATE: 18 BRPM | HEART RATE: 59 BPM | OXYGEN SATURATION: 90 %

## 2021-12-26 LAB — GLUCOSE BLDC GLUCOMTR-MCNC: 104 MG/DL (ref 70–105)

## 2021-12-26 PROCEDURE — 99239 HOSP IP/OBS DSCHRG MGMT >30: CPT | Performed by: INTERNAL MEDICINE

## 2021-12-26 PROCEDURE — 82962 GLUCOSE BLOOD TEST: CPT

## 2021-12-26 PROCEDURE — 63710000001 PREDNISONE PER 1 MG: Performed by: INTERNAL MEDICINE

## 2021-12-26 RX ADMIN — PREDNISONE 20 MG: 20 TABLET ORAL at 08:24

## 2021-12-26 RX ADMIN — CLOPIDOGREL BISULFATE 75 MG: 75 TABLET, FILM COATED ORAL at 08:24

## 2021-12-26 RX ADMIN — SODIUM CHLORIDE, PRESERVATIVE FREE 10 ML: 5 INJECTION INTRAVENOUS at 08:24

## 2021-12-26 RX ADMIN — GLIPIZIDE 5 MG: 5 TABLET ORAL at 08:24

## 2021-12-26 RX ADMIN — NEBIVOLOL 10 MG: 10 TABLET ORAL at 08:24

## 2021-12-26 RX ADMIN — AMLODIPINE BESYLATE 10 MG: 5 TABLET ORAL at 08:24

## 2021-12-26 RX ADMIN — Medication 81 MG: at 08:24

## 2021-12-27 NOTE — OUTREACH NOTE
Prep Survey      Responses   [facility]? [facility]   Is LACE score < 7 ? No   Emergency Room discharge w/ pulse ox? No   Eligibility Readm Mgmt   Discharge diagnosis Acute bronchitis due to Rhinovirus   Does the patient have one of the following disease processes/diagnoses(primary or secondary)? Other   Does the patient have Home health ordered? No   Is there a DME ordered? Yes   What DME was ordered? home O2- [facility]   Prep survey completed? Yes          Shara Montes RN

## 2022-01-05 ENCOUNTER — READMISSION MANAGEMENT (OUTPATIENT)
Dept: CALL CENTER | Facility: HOSPITAL | Age: 67
End: 2022-01-05

## 2022-01-05 NOTE — OUTREACH NOTE
Medical Week 2 Survey      Responses   [facility] patient discharged from? [facility]   Does the patient have one of the following disease processes/diagnoses(primary or secondary)? Other   Week 2 attempt successful? Yes   Call start time 1545   Call end time 1548   Meds reviewed with patient/caregiver? Yes   Is the patient having any side effects they believe may be caused by any medication additions or changes? No   Does the patient have all medications ordered at discharge? Yes   Is the patient taking all medications as directed (includes completed medication regime)? Yes   Does the patient have a primary care provider?  Yes   Does the patient have an appointment with their PCP within 7 days of discharge? Greater than 7 days   Comments regarding PCP PCP APPOINTMENT IS TUESDAY 1/11/22   What is preventing the patient from scheduling follow up appointments within 7 days of discharge? Earlier appointment not available   Nursing Interventions Verified appointment date/time/provider   Has the patient kept scheduled appointments due by today? N/A   Has home health visited the patient within 72 hours of discharge? N/A   What DME was ordered? home O2- [facility]   Has all DME been delivered? Yes   DME comments PATIENT IS NO LONGER NEEDING HOME O2   Psychosocial issues? No   Did the patient receive a copy of their discharge instructions? Yes   Nursing interventions Reviewed instructions with patient   What is the patient's perception of their health status since discharge? Improving   Is the patient/caregiver able to teach back signs and symptoms related to disease process for when to call PCP? Yes   Is the patient/caregiver able to teach back signs and symptoms related to disease process for when to call 911? Yes   Is the patient/caregiver able to teach back the hierarchy of who to call/visit for symptoms/problems? PCP, Specialist, Home health nurse, Urgent Care, ED, 911 Yes   If the patient is a current smoker, are they  able to teach back resources for cessation? Smoking cessation support groups   Week 2 Call Completed? Yes          Micaela Harper LPN

## 2022-01-11 ENCOUNTER — READMISSION MANAGEMENT (OUTPATIENT)
Dept: CALL CENTER | Facility: HOSPITAL | Age: 67
End: 2022-01-11

## 2022-01-11 NOTE — OUTREACH NOTE
"Medical Week 3 Survey      Responses   [facility] patient discharged from? [facility]   Does the patient have one of the following disease processes/diagnoses(primary or secondary)? Other   Week 3 attempt successful? Yes   Call start time 1742   Call end time 1746   Discharge diagnosis Acute bronchitis due to Rhinovirus   Is the patient taking all medications as directed (includes completed medication regime)? Yes   Does the patient have a primary care provider?  Yes   Has the patient kept scheduled appointments due by today? Yes   Psychosocial issues? No   What is the patient's perception of their health status since discharge? Improving   Is the patient/caregiver able to teach back signs and symptoms related to disease process for when to call PCP? Yes   Is the patient/caregiver able to teach back signs and symptoms related to disease process for when to call 911? Yes   Is the patient/caregiver able to teach back the hierarchy of who to call/visit for symptoms/problems? PCP, Specialist, Home health nurse, Urgent Care, ED, 911 Yes   Additional teach back comments States he is doing \"fine\".  Has not needed oxygen for the last 4 days and sats have been 95-97% on room air.   told him he could stop using oxygen.  Had televisit with PCP yesterday.    Week 3 Call Completed? Yes   Graduated Yes   Is the patient interested in additional calls from an ambulatory ?  NOTE:  applies to high risk patients requiring additional follow-up. No   Did the patient feel the follow up calls were helpful during their recovery period? Yes   Was the number of calls appropriate? Yes   Graduated/Revoked comments Denies questions or needs at this time          Brigette Willingham LPN  "

## 2022-06-30 ENCOUNTER — TELEPHONE (OUTPATIENT)
Dept: CARDIOLOGY | Facility: CLINIC | Age: 67
End: 2022-06-30

## 2022-06-30 DIAGNOSIS — I25.10 CORONARY ARTERY DISEASE INVOLVING NATIVE CORONARY ARTERY OF NATIVE HEART WITHOUT ANGINA PECTORIS: Chronic | ICD-10-CM

## 2022-06-30 RX ORDER — CLOPIDOGREL BISULFATE 75 MG/1
75 TABLET ORAL DAILY
Qty: 90 TABLET | Refills: 1 | Status: SHIPPED | OUTPATIENT
Start: 2022-06-30 | End: 2022-09-12 | Stop reason: SDUPTHER

## 2022-06-30 RX ORDER — TICAGRELOR 90 MG/1
TABLET ORAL
Qty: 180 TABLET | Refills: 3 | OUTPATIENT
Start: 2022-06-30

## 2022-06-30 NOTE — TELEPHONE ENCOUNTER
Dana was d/c'd on 10/13/2021 and Plavix was started. LM with wife. Rx sent in for Plavix just in case. Haley

## 2022-06-30 NOTE — TELEPHONE ENCOUNTER
Patient needs refill:    - Brilinta 90 MG   Please send to [facility] pharmacy on file.     Patient is completely out of medication.

## 2022-08-01 ENCOUNTER — OFFICE VISIT (OUTPATIENT)
Dept: CARDIOLOGY | Facility: CLINIC | Age: 67
End: 2022-08-01

## 2022-08-01 VITALS
RESPIRATION RATE: 18 BRPM | HEIGHT: 72 IN | DIASTOLIC BLOOD PRESSURE: 80 MMHG | WEIGHT: 265 LBS | BODY MASS INDEX: 35.89 KG/M2 | SYSTOLIC BLOOD PRESSURE: 156 MMHG | HEART RATE: 98 BPM

## 2022-08-01 DIAGNOSIS — R60.0 BILATERAL LOWER EXTREMITY EDEMA: Primary | ICD-10-CM

## 2022-08-01 PROCEDURE — 99214 OFFICE O/P EST MOD 30 MIN: CPT | Performed by: INTERNAL MEDICINE

## 2022-08-01 PROCEDURE — 93000 ELECTROCARDIOGRAM COMPLETE: CPT | Performed by: INTERNAL MEDICINE

## 2022-08-01 RX ORDER — CARVEDILOL 6.25 MG/1
6.25 TABLET ORAL 2 TIMES DAILY
Qty: 60 TABLET | Refills: 5 | Status: SHIPPED | OUTPATIENT
Start: 2022-08-01 | End: 2022-08-30

## 2022-08-02 NOTE — PROGRESS NOTES
"Cardiology Clinic Note  Dev Lawton MD, PhD    Subjective:     Encounter Date:08/01/2022      Patient ID: Jamil Self is a 66 y.o. male.    Chief Complaint:  Chief Complaint   Patient presents with   • Establish Care       HPI:  I the pleasure to see this 66-year-old gentleman today with history of MI acute coronary syndrome, diabetes hypertension hyperlipidemia struct of sleep apnea left heart cath was in March of last year with intervention SVG to RCA, remote bypass in the 1990s.  Currently still smokes 1 pack/day was counseled to quit.  Still complains of uncontrolled hypertension, heart rates are in the 90s, EKG today reveals sinus rhythm to sinus tachycardia poor R wave progression anteriorly nonspecific ST-T wave abnormality.  He denies any chest pain but complains of lower extremity edema and some degree of dyspnea on exertion.  His last cardiology follow-up was in October of last year.  History of bypass surgery LIMA to LAD, SVG to RCA and marginal branch, he received 4.0 x 33 Xience drug-eluting stent to the SVG graft to the RCA, EF preserved overall at 57% historically.  No additional anginal chest pain unexplained syncope or palpitations    Review of systems otherwise negative x14 point review of systems except was mentioned above      Historical data copied forward from previous encounters in EMR including the history, exam, and assessment/plan has been reviewed and is unchanged unless noted otherwise.    Cardiac medicines reviewed with risk, benefits, and necessity of each discussed.    Risk and benefit of cardiac testing reviewed including death heart attack stroke pain bleeding infection need for vascular /cardiovascular surgery were discussed and the patient     Objective:         /80 (BP Location: Left arm, Patient Position: Sitting)   Pulse 98   Resp 18   Ht 182.9 cm (72\")   Wt 120 kg (265 lb)   BMI 35.94 kg/m²       GENERAL: Alert and oriented x3, afebrile. Vital signs stable, " appeared comfortable, nondistressed, and appears stated age. Generalized weakness. Responds to questions appropriately.   HEENT: Normocephalic, atraumatic. Pupils equal, round and reactive to light. Extraocular movements intact bilaterally. Trachea midline.  Mucous membranes are moist.   NECK: Supple. No JVD. Trachea midline, no LAD  CHEST: Clear to auscultation bilaterally anteriorly; no rale, rhonchi, or wheezes  HEART: Regular rate and rhythm. No rubs, murmurs or gallops.   ABDOMEN: Soft, nontender, nondistended. Bowel sounds are otherwise positive.   EXTREMITIES: No clubbing, cyanosis,Moves all extremities  SKIN: Warm and dry. No ecchymoses, petechiae or rashes.  Edema below the knees significantly  MUSCULOSKELETAL: No bony abnormalities. Range of motion normal. No crepitus. No joint swelling or erythema.   NEUROLOGIC: Cranial nerves II-XII intact bilaterally. No focal neurologic deficits. Mini-Mental status exam was deferred.   LYMPHATICS: No lymphadenopathy.       Assessment:         Diagnoses and all orders for this visit:    1. Bilateral lower extremity edema (Primary)  -     Venous w Reflux Lower Extremity - Bilateral CAR; Future    Other orders  -     carvedilol (COREG) 6.25 MG tablet; Take 1 tablet by mouth 2 (Two) Times a Day.  Dispense: 60 tablet; Refill: 5           Plan:         ASSESSMENT AND PLAN:   History of acute coronary syndrome  Atherogenic dyslipidemia  CAD  Unstable angina  Status post PCI SVG to RCA  Lower extremity edema  Varicosities     Antiplatelet therapy with aspirin and Plavix  High intensity statin therapy  Stop Bystolic and add Coreg twice daily with uncontrolled blood pressure and heart rates in the 90s  With lower extremity edema will consider change from amlodipine to ARB losartan  Continue diabetes medicines  Afterload reduction with amlodipine, off losartan    Get bilateral lower extremity ultrasounds with venous reflux studies with lower extremity edema       30-day  telehealth visit    The pleasure to be involved in this patient's cardiovascular care.  Please call with any questions or concerns  Dev Lawton MD, PhD    Most recent EKG as reviewed and interpreted by me:    ECG 12 Lead    Date/Time: 8/1/2022 4:44 PM  Performed by: Dev Lawton MD  Authorized by: Dev Lawton MD   Comparison: not compared with previous ECG   Rhythm: sinus rhythm  Rate: normal  QRS axis: normal  Other findings: non-specific ST-T wave changes and poor R wave progression    Clinical impression: abnormal EKG             Most recent echo as reviewed and interpreted by me:      Most recent stress test as reviewed and interpreted by me:  Results for orders placed during the hospital encounter of 03/20/21    Stress Test With Myocardial Perfusion One Day    Interpretation Summary  · Left ventricular ejection fraction is normal. (Calculated EF = 57%).  · Myocardial perfusion imaging indicates a medium-sized, moderate-to-severe area of ischemia located in the inferior wall.  · Impressions are consistent with an intermediate risk study.  · There is no prior study available for comparison. Abnormal nuclear imaging Normal resting study with normal perfusion Inferolateral reversibility moderate to severe diminished counts, moderate size, concern for inferolateral ischemia Preserved EF 57%.  · Findings consistent with an abnormal ECG stress test.    Procedure: Supervision of Lexiscan Nuclear Stress Test    Informed consent was obtained. Baseline EKG shows SR with T wave inversion lateral limb leads. Patient received 0.4 mg IV lexiscan per protocol and immediately followed by cardiolite injection with nuclear images to follow. EKG during lexiscan showed mild ST depression lateral limb leads. Patient c/o chest pain rated 6/10 though patient appeared more uncomfortable than his stated rating.  This persisted 6 minutes into recovery and therefore 1 SL nitro was given.  Patient's chest pain  lowered to 3/10 and ST depression on EKG resolved. Please correlate with nuclear images for test results.    Stress testing was supervised by Anh ELISE.    Abnormal study  ST depression in 1 and aVL, abnormal stress ECG  Nuclear images have inferolateral reversibility suggestive of ischemia  Preserved EF 57%    Abnormal study      Most recent cardiac catheterization as reviewed interpreted by me:  Results for orders placed during the hospital encounter of 03/20/21    Cardiac Catheterization/Vascular Study    Narrative  Date of procedure: 3/21/2021  Estimated blood loss: 10 cc  Specimens removed: None    Indication for procedure:    1.  Unstable angina  2.  Positive stress test showing inferior wall ischemia  3.  Multiple coronary artery risk factors with known CAD status post CABG with LIMA to the LAD SVG to right coronary artery and SVG to obtuse marginal branch of left circumflex artery.    Procedures performed:    1.  Native coronary arteriography  2.  Left Heart catheterization  3.  Left ventriculography  4.  Selective angiography of the LIMA to the LAD  5.  Selective angiography of the SVG graft  6.  Percutaneous coronary intervention involving drug-eluting stent placement to the SVG graft to the right coronary artery (4.0 x 33 mm Xience drug-eluting stent)    Description of procedure:    Patient had the risks and benefits of the procedure explained to them in detail after which informed consent was obtained.  Patient was then brought to the cardiac catheterization lab and placed on the table in supine position.  Next the right groin was prepped and draped in sterile fashion.  Next using modified Seldinger technique and a 21-gauge micro puncture needle, the femoral artery was cannulated without difficulty and a 6 Lithuanian sheath was inserted and flushed with heparinized saline.  Next using diagnostic 6 Lithuanian JR4 and JL4 catheters, each advanced over an 0.035 guidewire to the level the aortic root and  then used to selectively engage their respective coronary ostia, multiple cineangiographic images were obtained all the appropriate projections using hand-injection of nonionic contrast material.  Before removal of the JR4 catheter the JR4 catheter used to cross the aortic valve into the left ventricle for left heart catheterization left ventriculography.  The catheter was then pulled back and removed.    The obvious culprit given the stress test was identified as the 90% stenosis in the mid SVG graft to the right coronary artery.  Therefore using a 6 Chinese multipurpose guid and a run-through wire we intubated and wired the vessel respectively placing the wire into the distal vessel direct stenting was performed using 4.0 x 33 mm Xience drug-eluting stent postdilated to greater than 4.5 mm with a 4.5 mm NC trek balloon at 20 justin along the stented segment.  We achieved an excellent angiographic result with preserved RYLIE-3 flow (RYLIE-3 prior RYLIE-3 post) with 0% residual stenosis in the stented segment.  The guide catheter and wire were then removed.    Patient tolerated the procedure well there were no complications.    During the case, conscious sedation monitoring was 60 min.    At the end of the case a 6 Chinese Angio-Seal device was deployed in the right groin for right groin hemostasis    Findings    Left heart catheterization:    1.  Opening aortic pressure was 134/63 mmHg the mean pressure of 92 mmHg  2.  The left ventricular end-diastolic pressure at end expiration was 30 mmHg  3.  There is no gradient across aortic valve on pullback    Left ventriculography    The overall estimated left ventricular ejection fraction was 50%    Native coronary arteriography: Right dominant    1.  Left main coronary arteries a large-caliber vessel gives rise left anterior descending left circumflex flex arteries.  There is a distal tapering 70% lesion into the circumflex artery.  2.  Left anterior descending artery is a  large-caliber vessel that gives rise to large caliber diagonal branches as it courses along the anterior interventricular groove and wraps around the apex.  There is an ostial 80% stenosis after which the LAD enlarges and is occluded at its midportion chronically totally prior to giving off a large diagonal branch which is moderately diffusely diseased.  3.  The left circumflex arteries a large-caliber vessel gives rise to large caliber bifurcating obtuse marginal branches.  The ostial proximal circumflex itself has a 70 to 80% stenosis after which there is a first and second obtuse marginal branch the first of which has not been revascularized the second of which has a dead and limb from the SVG graft to the OM with a diffuse 90% stenosis to this vessel.  There are left to left collaterals reconstituting this vessel and attempting to reconstitute this vessel distally.  4.  The right coronary arteries a large-caliber vessel that is severely diffusely diseased before becoming chronically totally occluded at its midportion and distal portions.    The aorto SVG graft to the obtuse marginal branch is stump occluded at its ostium    The aorto SVG graft to the right coronary artery is widely patent at its ostium but has a 90% narrowing in its midportion after which it is widely patent throughout the remaining graft into the anastomosis after which there is a concentric 40% lesion and otherwise no significant atherosclerotic disease present.    The LIMA to the LAD is widely patent in its ostium throughout its course and at its anastomosis with the native vessel; however after the anastomosis in the midportion of the vessel there is a diffuse 60 to 65% diffuse stenosis in a vessel that is 1.5 to 2 mm in diameter.    Conclusions:    1.  Severely elevated left heart filling pressures with preserved LV systolic function  2.  Normal epicardial anatomy with three-vessel coronary artery disease  3.  Widely patent LIMA to the LAD  after which there is borderline severe disease in the LAD albeit in a small caliber vessel.  4.  Stump occlusion of the SVG to the obtuse marginal branch which leaves the circumflex artery on revascularized at this time.  The patient did not have myocardial ischemia in this distribution.  Therefore we will treat this conservatively unless symptoms arise.  5.  Patent SVG to right coronary artery with an intervening critical lesion as described above.  6.  Successful PCI and stenting to the SVG to the right coronary artery    Recommendations:    Optimize medical therapy for secondary prevention of ischemic heart disease.    The following portions of the patient's history were reviewed and updated as appropriate: allergies, current medications, past family history, past medical history, past social history, past surgical history and problem list.      ROS:  14 point review of systems negative except as mentioned above    Current Outpatient Medications:   •  amLODIPine (NORVASC) 10 MG tablet, Take 10 mg by mouth Daily., Disp: , Rfl:   •  aspirin 81 MG EC tablet, Take 1 tablet by mouth Daily., Disp: 30 tablet, Rfl: 0  •  atorvastatin (LIPITOR) 20 MG tablet, Take 20 mg by mouth Daily., Disp: , Rfl:   •  clopidogrel (PLAVIX) 75 MG tablet, Take 1 tablet by mouth Daily., Disp: 90 tablet, Rfl: 1  •  glipizide (GLUCOTROL) 5 MG tablet, Take 1 tablet by mouth Every Morning Before Breakfast for 30 days., Disp: 30 tablet, Rfl: 0  •  metFORMIN (GLUCOPHAGE) 1000 MG tablet, Take 500 mg by mouth 2 (Two) Times a Day With Meals., Disp: , Rfl:   •  carvedilol (COREG) 6.25 MG tablet, Take 1 tablet by mouth 2 (Two) Times a Day., Disp: 60 tablet, Rfl: 5    Problem List:  Patient Active Problem List   Diagnosis   • Essential hypertension   • Diabetes mellitus (HCC)   • Hyperlipidemia   • Tobacco abuse   • CAD (coronary artery disease)   • LIZETTE (obstructive sleep apnea)   • Obesity (BMI 30-39.9)   • CKD (chronic kidney disease) stage 3, GFR  30-59 ml/min (HCC)   • Acute bronchitis due to Rhinovirus   • Controlled type 2 diabetes mellitus without complication, without long-term current use of insulin (HCC)   • Dyslipidemia   • Hx of CABG   • Tobacco dependence   • Hyponatremia     Past Medical History:  Past Medical History:   Diagnosis Date   • Coronary artery disease    • Hyperlipidemia    • Hypertension    • LIZETTE (obstructive sleep apnea)      Past Surgical History:  Past Surgical History:   Procedure Laterality Date   • CARDIAC CATHETERIZATION N/A 3/21/2021    Procedure: Left Heart Cath;  Surgeon: Jourdan Pillai MD;  [facility] CATH INVASIVE [facility];  Service: Cardiology;  Laterality: N/A;   • CAROTID STENT       Social History:  Social History     Socioeconomic History   • Marital status:    Tobacco Use   • Smoking status: Current Every Day Smoker     Packs/day: 1.00     Types: Cigarettes   • Smokeless tobacco: Never Used   Substance and Sexual Activity   • Alcohol use: Never   • Drug use: Never   • Sexual activity: Defer     Allergies:  No Known Allergies  Immunizations:    There is no immunization history on file for this patient.         In-Office Procedure(s):  No orders to display        ASCVD RIsk Score::  The ASCVD Risk score (Toivola TONIE Jr., et al., 2013) failed to calculate for the following reasons:    The valid total cholesterol range is 130 to 320 mg/dL    Imaging:    Results for orders placed during the hospital encounter of 12/21/21    XR Chest 1 View    Narrative  DATE OF EXAM:  12/21/2021 11:16 PM    PROCEDURE:  XR CHEST 1 VW-    INDICATIONS:  soa    COMPARISON:  3/20/2021    TECHNIQUE:  Portable chest radiograph.    FINDINGS:  Postsurgical changes of sternotomy and CABG noted. The patient is  slightly rotated to the right. No focal consolidation, pneumothorax, or  large pleural effusion. Osseous structures grossly intact.    Impression  No acute process.    Electronically Signed By-Vitaliy Zafar MD On:12/22/2021  7:07 AM  This report was finalized on [id number] by  Vitaliy Zafar MD.       Results for orders placed during the hospital encounter of 12/21/21    US Renal Bilateral    Narrative  Examination: US RENAL BILATERAL-    Date of Exam: 12/23/2021 10:54 AM    Indication: Acute kidney injury; J20.6-Acute bronchitis due to  Rhinovirus; J96.01-Acute respiratory failure with hypoxia;  Z20.822-Contact with and (suspected) exposure to covid-19.    Comparison: None available.    Technique: Grayscale and color Doppler ultrasound evaluation of the  kidneys and urinary bladder was performed    Findings:  The right kidney measures 12.1 x 7.1 x 5.3 cm and the left kidney  measures 10.4 x 4.6 x 4.6 cm. Kidney echogenicity and vascularity appear  within normal limits. There is no solid kidney mass.  No echogenic  shadowing stone.  No hydronephrosis.      Limited visualization of the urinary bladder is unremarkable.    Impression  Unremarkable renal ultrasound with no evidence of hydronephrosis.    Electronically Signed By-Ezequiel Hedrick MD On:12/23/2021 11:47 AM  This report was finalized on [id number] by  Ezequiel Hedrick MD.          Lab Review:   No visits with results within 6 Month(s) from this visit.   Latest known visit with results is:   No results displayed because visit has over 200 results.        Recent labs reviewed and interpreted for clinical significance and application            Level of Care:           Dev Lawton MD  08/02/22  .

## 2022-08-08 ENCOUNTER — HOSPITAL ENCOUNTER (OUTPATIENT)
Dept: CARDIOLOGY | Facility: HOSPITAL | Age: 67
Discharge: HOME | End: 2022-08-08
Admitting: INTERNAL MEDICINE

## 2022-08-08 DIAGNOSIS — R60.0 BILATERAL LOWER EXTREMITY EDEMA: ICD-10-CM

## 2022-08-08 LAB
BH CV LOW VAS RIGHT COMMON FEM NOT VIS SCRIPTING: 1
BH CV LOW VAS RIGHT GSV DIST CALF VESSEL: 1
BH CV LOWER VASCULAR RIGHT COMMON FEMORAL AUGMENT: NORMAL
BH CV LOWER VASCULAR RIGHT COMMON FEMORAL COMPETENT: NORMAL
BH CV LOWER VASCULAR RIGHT COMMON FEMORAL COMPRESS: NORMAL
BH CV LOWER VASCULAR RIGHT COMMON FEMORAL PHASIC: NORMAL
BH CV LOWER VASCULAR RIGHT COMMON FEMORAL SPONT: NORMAL
BH CV LOWER VASCULAR RIGHT DISTAL FEMORAL COMPRESS: NORMAL
BH CV LOWER VASCULAR RIGHT GASTRONEMIUS COMPRESS: NORMAL
BH CV LOWER VASCULAR RIGHT MID FEMORAL AUGMENT: NORMAL
BH CV LOWER VASCULAR RIGHT MID FEMORAL COMPETENT: NORMAL
BH CV LOWER VASCULAR RIGHT MID FEMORAL COMPRESS: NORMAL
BH CV LOWER VASCULAR RIGHT MID FEMORAL PHASIC: NORMAL
BH CV LOWER VASCULAR RIGHT MID FEMORAL SPONT: NORMAL
BH CV LOWER VASCULAR RIGHT POPLITEAL AUGMENT: NORMAL
BH CV LOWER VASCULAR RIGHT POPLITEAL COMPETENT: NORMAL
BH CV LOWER VASCULAR RIGHT POPLITEAL COMPRESS: NORMAL
BH CV LOWER VASCULAR RIGHT POPLITEAL PHASIC: NORMAL
BH CV LOWER VASCULAR RIGHT POPLITEAL SPONT: NORMAL
BH CV LOWER VASCULAR RIGHT PROXIMAL FEMORAL COMPRESS: NORMAL
BH CV RIGHT LOWER VAS COMMON FEMORAL REFLUX COLOR FLOW TIME: 5.56 SEC
BH CV RIGHT LOWER VAS GSV KNEE REFLUX TIME: 6.05 SEC
BH CV RIGHT LOWER VAS GSV KNEE TRANS DIAMETER: 0.36 CM
BH CV RIGHT LOWER VAS GSV MID THIGH TRANS DIAMETER: 0.45 CM
BH CV RIGHT LOWER VAS GSV PROX THIGH REFLUX TIME: 1.12 SEC
BH CV RIGHT LOWER VAS GSV PROX THIGH TRANS DIAMETER: 0.41 CM
BH CV RIGHT LOWER VAS SAPHENOFEM JUNCTION REFLUX TIME: 6.14 SEC
BH CV RIGHT LOWER VAS SAPHENOFEM JUNCTION TRANSVERSE DIAMETER: 0.36 CM
BH CV RIGHT LOWER VAS SSV PROX CALF TRANS DIAMETER: 0.17 CM
MAXIMAL PREDICTED HEART RATE: 153 BPM
STRESS TARGET HR: 130 BPM

## 2022-08-08 PROCEDURE — 93971 EXTREMITY STUDY: CPT

## 2022-08-30 RX ORDER — CARVEDILOL 6.25 MG/1
TABLET ORAL
Qty: 60 TABLET | Refills: 5 | Status: SHIPPED | OUTPATIENT
Start: 2022-08-30

## 2022-09-02 PROBLEM — I25.10 CORONARY ARTERY DISEASE INVOLVING NATIVE CORONARY ARTERY OF NATIVE HEART WITHOUT ANGINA PECTORIS: Status: ACTIVE | Noted: 2021-03-20

## 2022-09-02 PROBLEM — E78.2 MIXED HYPERLIPIDEMIA: Status: ACTIVE | Noted: 2021-03-20

## 2022-09-06 ENCOUNTER — OFFICE VISIT (OUTPATIENT)
Dept: CARDIOLOGY | Facility: CLINIC | Age: 67
End: 2022-09-06

## 2022-09-06 VITALS
HEIGHT: 72 IN | DIASTOLIC BLOOD PRESSURE: 78 MMHG | SYSTOLIC BLOOD PRESSURE: 151 MMHG | BODY MASS INDEX: 35.89 KG/M2 | WEIGHT: 265 LBS

## 2022-09-06 DIAGNOSIS — I25.10 CORONARY ARTERY DISEASE INVOLVING NATIVE CORONARY ARTERY OF NATIVE HEART WITHOUT ANGINA PECTORIS: Primary | ICD-10-CM

## 2022-09-06 DIAGNOSIS — E11.9 CONTROLLED TYPE 2 DIABETES MELLITUS WITHOUT COMPLICATION, WITHOUT LONG-TERM CURRENT USE OF INSULIN: ICD-10-CM

## 2022-09-06 DIAGNOSIS — E78.2 MIXED HYPERLIPIDEMIA: ICD-10-CM

## 2022-09-06 DIAGNOSIS — N18.30 STAGE 3 CHRONIC KIDNEY DISEASE, UNSPECIFIED WHETHER STAGE 3A OR 3B CKD: ICD-10-CM

## 2022-09-06 DIAGNOSIS — I10 ESSENTIAL HYPERTENSION: Chronic | ICD-10-CM

## 2022-09-06 DIAGNOSIS — I87.2 VENOUS INSUFFICIENCY: ICD-10-CM

## 2022-09-06 PROCEDURE — 99214 OFFICE O/P EST MOD 30 MIN: CPT | Performed by: INTERNAL MEDICINE

## 2022-09-06 RX ORDER — GLIPIZIDE 5 MG/1
5 TABLET, FILM COATED, EXTENDED RELEASE ORAL DAILY
COMMUNITY
End: 2022-09-12

## 2022-09-06 RX ORDER — LOSARTAN POTASSIUM 25 MG/1
25 TABLET ORAL DAILY
Qty: 90 TABLET | Refills: 1 | Status: SHIPPED | OUTPATIENT
Start: 2022-09-06 | End: 2022-09-20

## 2022-09-06 NOTE — PROGRESS NOTES
Cardiology Clinic Note  Dev Lawton MD, PhD    Subjective:     Encounter Date:09/06/2022      Patient ID: Jamil Self is a 66 y.o. male.    Chief Complaint:  Chief Complaint   Patient presents with   • Coronary Artery Disease   • Hypertension   • Hyperlipidemia   • Follow-up   • Diabetes       HPI:  Verbal consent for telehealth obtained today  Unable to complete visit using a video connection to the patient. A phone visit was used to complete this visits. Total time of discussion was 18 minutes with additional time and charting coordination of care for total encounter time greater than 25 minutes.    Previously  I the pleasure to see this 66-year-old gentleman with history of MI acute coronary syndrome, diabetes hypertension hyperlipidemia obstructive sleep apnea left heart cath was in March of last year with intervention SVG to RCA, remote bypass in the 1990s.  Currently still smokes was counseled to quit.  Still complains of uncontrolled hypertension, heart rates are in the 90s, EKG today reveals sinus rhythm to sinus tachycardia poor R wave progression anteriorly nonspecific ST-T wave abnormality.  He denies any chest pain but complains of lower extremity edema and some degree of dyspnea on exertion.  His last cardiology follow-up was in October of last year.  History of bypass surgery LIMA to LAD, SVG to RCA and marginal branch, he received 4.0 x 33 Xience drug-eluting stent to the SVG graft to the RCA, EF preserved overall at 57% historically.  No additional anginal chest pain unexplained syncope or palpitations    After last clinic we ordered venous reflux studies which she has significant venous reflux of the right lower extremity.  His blood pressure remains uncontrolled.  His creatinine was elevated previously but not does not see nephrology.  No other complaints otherwise    Plan today, repeat BMP now to evaluate for ARB therapy  Start losartan 25 daily  BMP repeat in a week to evaluate for  "changes in potassium and renal function  Refer to vascular surgery for venous insufficiency right lower extremity which is progressive  Referral to nephrology with history of CKD on top of diabetes and hypertension     Review of systems otherwise negative x14 point review of systems except was mentioned above        Historical data copied forward from previous encounters in EMR including the history, exam, and assessment/plan has been reviewed and is unchanged unless noted otherwise.     Cardiac medicines reviewed with risk, benefits, and necessity of each discussed.     Risk and benefit of cardiac testing reviewed including death heart attack stroke pain bleeding infection need for vascular /cardiovascular surgery were discussed and the patient      Objective:         Objective       On previous encounter  /80 (BP Location: Left arm, Patient Position: Sitting)   Pulse 98   Resp 18   Ht 182.9 cm (72\")   Wt 120 kg (265 lb)   BMI 35.94 kg/m²         GENERAL: Alert and oriented x3, afebrile. Vital signs stable, appeared comfortable, nondistressed, and appears stated age. Generalized weakness. Responds to questions appropriately.   HEENT: Normocephalic, atraumatic. Pupils equal, round and reactive to light. Extraocular movements intact bilaterally. Trachea midline.  Mucous membranes are moist.   NECK: Supple. No JVD. Trachea midline, no LAD  CHEST: Clear to auscultation bilaterally anteriorly; no rale, rhonchi, or wheezes  HEART: Regular rate and rhythm. No rubs, murmurs or gallops.   ABDOMEN: Soft, nontender, nondistended. Bowel sounds are otherwise positive.   EXTREMITIES: No clubbing, cyanosis,Moves all extremities  SKIN: Warm and dry. No ecchymoses, petechiae or rashes.  Edema below the knees significantly  MUSCULOSKELETAL: No bony abnormalities. Range of motion normal. No crepitus. No joint swelling or erythema.   NEUROLOGIC: Cranial nerves II-XII intact bilaterally. No focal neurologic deficits. " "Mini-Mental status exam was deferred.   LYMPHATICS: No lymphadenopathy.      No exam today  Assessment:         Assessment          Diagnoses and all orders for this visit:     1. Bilateral lower extremity edema (Primary)  -     Venous w Reflux Lower Extremity - Bilateral CAR; Future  Abnormal  Refer to vascular surgery for right lower extremity venous reflux disease significantly    Uncontrolled hypertension  Losartan 25 daily  BMP today as well as in 10 days for recheck  Refer to Dr. Jeffers for CKD in the setting of diabetes and hypertension              Plan:         ASSESSMENT AND PLAN:   History of acute coronary syndrome  Atherogenic dyslipidemia  CAD  Unstable angina  Status post PCI SVG to RCA  Lower extremity edema  Varicosities     Antiplatelet therapy with aspirin and Plavix  High intensity statin therapy  Coreg twice daily  Losartan 25 daily, remains on amlodipine, consider decreasing with lower extremity edema  Continue diabetes medicines  Afterload reduction with amlodipine, evaluate for restarting losartan at a low dose with BMP now and in 10 days     Get bilateral lower extremity ultrasounds with venous reflux studies with lower extremity edema  Abnormal reflux studies, refer to vascular surgery        7 to 10-day telehealth visit     The pleasure to be involved in this patient's cardiovascular care.  Please call with any questions or concerns  Dev Lawton MD, PhD      Historical data copied forward from previous encounters in EMR including the history, exam, and assessment/plan has been reviewed and is unchanged unless noted otherwise.    Cardiac medicines reviewed with risk, benefits, and necessity of each discussed.    Risk and benefit of cardiac testing reviewed including death heart attack stroke pain bleeding infection need for vascular /cardiovascular surgery were discussed and the patient     Objective:         /78   Ht 182.9 cm (72.01\")   Wt 120 kg (265 lb)   BMI 35.93 kg/m² "     Physical Exam    Assessment:         Diagnoses and all orders for this visit:    1. Coronary artery disease involving native coronary artery of native heart without angina pectoris (Primary)    2. Controlled type 2 diabetes mellitus without complication, without long-term current use of insulin (HCC)    3. Essential hypertension    4. Mixed hyperlipidemia  -     Basic Metabolic Panel; Future  -     Basic Metabolic Panel; Future    5. Venous insufficiency  -     Ambulatory Referral to Vascular Surgery    6. Stage 3 chronic kidney disease, unspecified whether stage 3a or 3b CKD (HCC)  -     Ambulatory Referral to Nephrology           Plan:              The pleasure to be involved in this patient's cardiovascular care.  Please call with any questions or concerns  Dev Lawton MD, PhD    Most recent EKG as reviewed and interpreted by me:  Procedures     Most recent echo as reviewed and interpreted by me:      Most recent stress test as reviewed and interpreted by me:  Results for orders placed during the hospital encounter of 03/20/21    Stress Test With Myocardial Perfusion One Day    Interpretation Summary  · Left ventricular ejection fraction is normal. (Calculated EF = 57%).  · Myocardial perfusion imaging indicates a medium-sized, moderate-to-severe area of ischemia located in the inferior wall.  · Impressions are consistent with an intermediate risk study.  · There is no prior study available for comparison. Abnormal nuclear imaging Normal resting study with normal perfusion Inferolateral reversibility moderate to severe diminished counts, moderate size, concern for inferolateral ischemia Preserved EF 57%.  · Findings consistent with an abnormal ECG stress test.    Procedure: Supervision of Lexiscan Nuclear Stress Test    Informed consent was obtained. Baseline EKG shows SR with T wave inversion lateral limb leads. Patient received 0.4 mg IV lexiscan per protocol and immediately followed by cardiolite  injection with nuclear images to follow. EKG during lexiscan showed mild ST depression lateral limb leads. Patient c/o chest pain rated 6/10 though patient appeared more uncomfortable than his stated rating.  This persisted 6 minutes into recovery and therefore 1 SL nitro was given.  Patient's chest pain lowered to 3/10 and ST depression on EKG resolved. Please correlate with nuclear images for test results.    Stress testing was supervised by Anh ELISE.    Abnormal study  ST depression in 1 and aVL, abnormal stress ECG  Nuclear images have inferolateral reversibility suggestive of ischemia  Preserved EF 57%    Abnormal study      Most recent cardiac catheterization as reviewed interpreted by me:  Results for orders placed during the hospital encounter of 03/20/21    Cardiac Catheterization/Vascular Study    Narrative  Date of procedure: 3/21/2021  Estimated blood loss: 10 cc  Specimens removed: None    Indication for procedure:    1.  Unstable angina  2.  Positive stress test showing inferior wall ischemia  3.  Multiple coronary artery risk factors with known CAD status post CABG with LIMA to the LAD SVG to right coronary artery and SVG to obtuse marginal branch of left circumflex artery.    Procedures performed:    1.  Native coronary arteriography  2.  Left Heart catheterization  3.  Left ventriculography  4.  Selective angiography of the LIMA to the LAD  5.  Selective angiography of the SVG graft  6.  Percutaneous coronary intervention involving drug-eluting stent placement to the SVG graft to the right coronary artery (4.0 x 33 mm Xience drug-eluting stent)    Description of procedure:    Patient had the risks and benefits of the procedure explained to them in detail after which informed consent was obtained.  Patient was then brought to the cardiac catheterization lab and placed on the table in supine position.  Next the right groin was prepped and draped in sterile fashion.  Next using modified  Seldinger technique and a 21-gauge micro puncture needle, the femoral artery was cannulated without difficulty and a 6 Lebanese sheath was inserted and flushed with heparinized saline.  Next using diagnostic 6 Lebanese JR4 and JL4 catheters, each advanced over an 0.035 guidewire to the level the aortic root and then used to selectively engage their respective coronary ostia, multiple cineangiographic images were obtained all the appropriate projections using hand-injection of nonionic contrast material.  Before removal of the JR4 catheter the JR4 catheter used to cross the aortic valve into the left ventricle for left heart catheterization left ventriculography.  The catheter was then pulled back and removed.    The obvious culprit given the stress test was identified as the 90% stenosis in the mid SVG graft to the right coronary artery.  Therefore using a 6 Lebanese multipurpose guid and a run-through wire we intubated and wired the vessel respectively placing the wire into the distal vessel direct stenting was performed using 4.0 x 33 mm Xience drug-eluting stent postdilated to greater than 4.5 mm with a 4.5 mm NC trek balloon at 20 justin along the stented segment.  We achieved an excellent angiographic result with preserved RYLIE-3 flow (RYLIE-3 prior RYLIE-3 post) with 0% residual stenosis in the stented segment.  The guide catheter and wire were then removed.    Patient tolerated the procedure well there were no complications.    During the case, conscious sedation monitoring was 60 min.    At the end of the case a 6 Lebanese Angio-Seal device was deployed in the right groin for right groin hemostasis    Findings    Left heart catheterization:    1.  Opening aortic pressure was 134/63 mmHg the mean pressure of 92 mmHg  2.  The left ventricular end-diastolic pressure at end expiration was 30 mmHg  3.  There is no gradient across aortic valve on pullback    Left ventriculography    The overall estimated left ventricular  ejection fraction was 50%    Native coronary arteriography: Right dominant    1.  Left main coronary arteries a large-caliber vessel gives rise left anterior descending left circumflex flex arteries.  There is a distal tapering 70% lesion into the circumflex artery.  2.  Left anterior descending artery is a large-caliber vessel that gives rise to large caliber diagonal branches as it courses along the anterior interventricular groove and wraps around the apex.  There is an ostial 80% stenosis after which the LAD enlarges and is occluded at its midportion chronically totally prior to giving off a large diagonal branch which is moderately diffusely diseased.  3.  The left circumflex arteries a large-caliber vessel gives rise to large caliber bifurcating obtuse marginal branches.  The ostial proximal circumflex itself has a 70 to 80% stenosis after which there is a first and second obtuse marginal branch the first of which has not been revascularized the second of which has a dead and limb from the SVG graft to the OM with a diffuse 90% stenosis to this vessel.  There are left to left collaterals reconstituting this vessel and attempting to reconstitute this vessel distally.  4.  The right coronary arteries a large-caliber vessel that is severely diffusely diseased before becoming chronically totally occluded at its midportion and distal portions.    The aorto SVG graft to the obtuse marginal branch is stump occluded at its ostium    The aorto SVG graft to the right coronary artery is widely patent at its ostium but has a 90% narrowing in its midportion after which it is widely patent throughout the remaining graft into the anastomosis after which there is a concentric 40% lesion and otherwise no significant atherosclerotic disease present.    The LIMA to the LAD is widely patent in its ostium throughout its course and at its anastomosis with the native vessel; however after the anastomosis in the midportion of the  vessel there is a diffuse 60 to 65% diffuse stenosis in a vessel that is 1.5 to 2 mm in diameter.    Conclusions:    1.  Severely elevated left heart filling pressures with preserved LV systolic function  2.  Normal epicardial anatomy with three-vessel coronary artery disease  3.  Widely patent LIMA to the LAD after which there is borderline severe disease in the LAD albeit in a small caliber vessel.  4.  Stump occlusion of the SVG to the obtuse marginal branch which leaves the circumflex artery on revascularized at this time.  The patient did not have myocardial ischemia in this distribution.  Therefore we will treat this conservatively unless symptoms arise.  5.  Patent SVG to right coronary artery with an intervening critical lesion as described above.  6.  Successful PCI and stenting to the SVG to the right coronary artery    Recommendations:    Optimize medical therapy for secondary prevention of ischemic heart disease.    The following portions of the patient's history were reviewed and updated as appropriate: allergies, current medications, past family history, past medical history, past social history, past surgical history and problem list.      ROS:  14 point review of systems negative except as mentioned above    Current Outpatient Medications:   •  amLODIPine (NORVASC) 10 MG tablet, Take 10 mg by mouth Daily., Disp: , Rfl:   •  aspirin 81 MG EC tablet, Take 1 tablet by mouth Daily., Disp: 30 tablet, Rfl: 0  •  atorvastatin (LIPITOR) 20 MG tablet, Take 20 mg by mouth Daily., Disp: , Rfl:   •  carvedilol (COREG) 6.25 MG tablet, TAKE 1 TABLET BY MOUTH TWICE A DAY, Disp: 60 tablet, Rfl: 5  •  clopidogrel (PLAVIX) 75 MG tablet, Take 1 tablet by mouth Daily., Disp: 90 tablet, Rfl: 1  •  glipizide (GLUCOTROL XL) 5 MG ER tablet, Take 5 mg by mouth Daily., Disp: , Rfl:   •  metFORMIN (GLUCOPHAGE) 1000 MG tablet, Take 500 mg by mouth 2 (Two) Times a Day With Meals., Disp: , Rfl:   •  losartan (Cozaar) 25 MG  tablet, Take 1 tablet by mouth Daily., Disp: 90 tablet, Rfl: 1    Problem List:  Patient Active Problem List   Diagnosis   • Essential hypertension   • Diabetes mellitus (HCC)   • Mixed hyperlipidemia   • Tobacco abuse   • Coronary artery disease involving native coronary artery of native heart without angina pectoris   • LIZETTE (obstructive sleep apnea)   • Obesity (BMI 30-39.9)   • CKD (chronic kidney disease) stage 3, GFR 30-59 ml/min (HCC)   • Acute bronchitis due to Rhinovirus   • Controlled type 2 diabetes mellitus without complication, without long-term current use of insulin (HCC)   • Dyslipidemia   • Hx of CABG   • Tobacco dependence   • Hyponatremia     Past Medical History:  Past Medical History:   Diagnosis Date   • Coronary artery disease    • Diabetes mellitus (HCC)    • Hyperlipidemia    • Hypertension    • LIZETTE (obstructive sleep apnea)      Past Surgical History:  Past Surgical History:   Procedure Laterality Date   • CARDIAC CATHETERIZATION N/A 03/21/2021    Procedure: Left Heart Cath;  Surgeon: Jourdan Pillai MD;  Location: [facility] CATH INVASIVE LOC[facility];  Service: Cardiology;  Laterality: N/A;   • CAROTID STENT     • CORONARY STENT PLACEMENT       Social History:  Social History     Socioeconomic History   • Marital status:    Tobacco Use   • Smoking status: Current Every Day Smoker     Packs/day: 1.00     Types: Cigarettes   • Smokeless tobacco: Never Used   Vaping Use   • Vaping Use: Never used   Substance and Sexual Activity   • Alcohol use: Never   • Drug use: Never   • Sexual activity: Defer     Allergies:  No Known Allergies  Immunizations:    There is no immunization history on file for this patient.         In-Office Procedure(s):  No orders to display        ASCVD RIsk Score::  The ASCVD Risk score (Artemiofadumo SCHILLING Jr., et al., 2013) failed to calculate for the following reasons:    The valid total cholesterol range is 130 to 320 mg/dL    Imaging:    Results for orders placed  during the hospital encounter of 12/21/21    XR Chest 1 View    Narrative  DATE OF EXAM:  12/21/2021 11:16 PM    PROCEDURE:  XR CHEST 1 VW-    INDICATIONS:  soa    COMPARISON:  3/20/2021    TECHNIQUE:  Portable chest radiograph.    FINDINGS:  Postsurgical changes of sternotomy and CABG noted. The patient is  slightly rotated to the right. No focal consolidation, pneumothorax, or  large pleural effusion. Osseous structures grossly intact.    Impression  No acute process.    Electronically Signed By-Vitaliy Zafar MD On:12/22/2021 7:07 AM  This report was finalized on [id number] by  Vitaliy Zafar MD.       Results for orders placed during the hospital encounter of 12/21/21    US Renal Bilateral    Narrative  Examination: US RENAL BILATERAL-    Date of Exam: 12/23/2021 10:54 AM    Indication: Acute kidney injury; J20.6-Acute bronchitis due to  Rhinovirus; J96.01-Acute respiratory failure with hypoxia;  Z20.822-Contact with and (suspected) exposure to covid-19.    Comparison: None available.    Technique: Grayscale and color Doppler ultrasound evaluation of the  kidneys and urinary bladder was performed    Findings:  The right kidney measures 12.1 x 7.1 x 5.3 cm and the left kidney  measures 10.4 x 4.6 x 4.6 cm. Kidney echogenicity and vascularity appear  within normal limits. There is no solid kidney mass.  No echogenic  shadowing stone.  No hydronephrosis.      Limited visualization of the urinary bladder is unremarkable.    Impression  Unremarkable renal ultrasound with no evidence of hydronephrosis.    Electronically Signed By-Ezequiel Hedrick MD On:12/23/2021 11:47 AM  This report was finalized on [id number] by  Ezequiel Hedrick MD.          Lab Review:   Hospital Outpatient Visit on 08/08/2022   Component Date Value   • Target HR (85%) 08/08/2022 130    • Max. Pred. HR (100%) 08/08/2022 153    • Right Common Femoral Spo* 08/08/2022 Y    • Right Common Femoral Pha* 08/08/2022 Y    • Right Common Femoral Aug*  08/08/2022 Y    • Right Common Femoral Com* 08/08/2022 N    • Right Common Femoral Com* 08/08/2022 C    • Right Proximal Femoral C* 08/08/2022 C    • Right Mid Femoral Spont 08/08/2022 Y    • Right Mid Femoral Phasic 08/08/2022 Y    • Right Mid Femoral Augment 08/08/2022 Y    • Right Mid Femoral Compet* 08/08/2022 Y    • Right Mid Femoral Compre* 08/08/2022 C    • Right Distal Femoral Com* 08/08/2022 C    • Right Popliteal Spont 08/08/2022 Y    • Right Popliteal Phasic 08/08/2022 Y    • Right Popliteal Augment 08/08/2022 Y    • Right Popliteal Competent 08/08/2022 Y    • Right Popliteal Compress 08/08/2022 C    • Right Gastronemius Compr* 08/08/2022 C    • BH CV LOW VAS RIGHT COMM* 08/08/2022 1    • Rt. SFJ chinchilla diam 08/08/2022 0.36    • CFV reflux dur 08/08/2022 5.56    • Rt. SFJ reflux time 08/08/2022 6.14    • BH CV RIGHT LOWER VAS GS* 08/08/2022 0.41    • BH CV RIGHT LOWER VAS GS* 08/08/2022 1.12    • BH CV RIGHT LOWER VAS GS* 08/08/2022 0.45    • BH CV RIGHT LOWER VAS GS* 08/08/2022 0.36    • BH CV RIGHT LOWER VAS GS* 08/08/2022 6.05    • BH CV RIGHT LOWER VAS SS* 08/08/2022 0.17    • BH CV LOW VAS RIGHT GSV * 08/08/2022 1      Recent labs reviewed and interpreted for clinical significance and application            Level of Care:           Dev Lawton MD  09/06/22  .

## 2022-09-07 ENCOUNTER — LAB (OUTPATIENT)
Dept: LAB | Facility: HOSPITAL | Age: 67
End: 2022-09-07

## 2022-09-07 DIAGNOSIS — E78.2 MIXED HYPERLIPIDEMIA: ICD-10-CM

## 2022-09-07 LAB
ANION GAP SERPL CALCULATED.3IONS-SCNC: 8.3 MMOL/L (ref 5–15)
BUN SERPL-MCNC: 15 MG/DL (ref 8–23)
BUN/CREAT SERPL: 11.5 (ref 7–25)
CALCIUM SPEC-SCNC: 9.2 MG/DL (ref 8.6–10.5)
CHLORIDE SERPL-SCNC: 102 MMOL/L (ref 98–107)
CO2 SERPL-SCNC: 26.7 MMOL/L (ref 22–29)
CREAT SERPL-MCNC: 1.31 MG/DL (ref 0.76–1.27)
EGFRCR SERPLBLD CKD-EPI 2021: 59.7 ML/MIN/1.73
GLUCOSE SERPL-MCNC: 113 MG/DL (ref 65–99)
POTASSIUM SERPL-SCNC: 4.7 MMOL/L (ref 3.5–5.2)
SODIUM SERPL-SCNC: 137 MMOL/L (ref 136–145)

## 2022-09-07 PROCEDURE — 36415 COLL VENOUS BLD VENIPUNCTURE: CPT

## 2022-09-07 PROCEDURE — 80048 BASIC METABOLIC PNL TOTAL CA: CPT

## 2022-09-07 RX ORDER — AMLODIPINE BESYLATE 10 MG/1
1 TABLET ORAL DAILY
COMMUNITY
Start: 2022-09-07

## 2022-09-07 RX ORDER — NEBIVOLOL 10 MG/1
1 TABLET ORAL
COMMUNITY
Start: 2022-09-07 | End: 2022-09-12

## 2022-09-07 RX ORDER — LOSARTAN POTASSIUM 100 MG/1
1 TABLET ORAL DAILY
COMMUNITY
Start: 2022-09-07 | End: 2022-09-12

## 2022-09-08 DIAGNOSIS — E78.2 MIXED HYPERLIPIDEMIA: Primary | ICD-10-CM

## 2022-09-12 ENCOUNTER — OFFICE VISIT (OUTPATIENT)
Dept: CARDIOLOGY | Facility: CLINIC | Age: 67
End: 2022-09-12

## 2022-09-12 VITALS — HEIGHT: 72 IN | WEIGHT: 265 LBS | BODY MASS INDEX: 35.89 KG/M2

## 2022-09-12 DIAGNOSIS — E78.2 MIXED HYPERLIPIDEMIA: ICD-10-CM

## 2022-09-12 DIAGNOSIS — I10 ESSENTIAL HYPERTENSION: Chronic | ICD-10-CM

## 2022-09-12 DIAGNOSIS — I25.10 CORONARY ARTERY DISEASE INVOLVING NATIVE CORONARY ARTERY OF NATIVE HEART WITHOUT ANGINA PECTORIS: Primary | ICD-10-CM

## 2022-09-12 DIAGNOSIS — E11.9 CONTROLLED TYPE 2 DIABETES MELLITUS WITHOUT COMPLICATION, WITHOUT LONG-TERM CURRENT USE OF INSULIN: ICD-10-CM

## 2022-09-12 DIAGNOSIS — I25.10 CORONARY ARTERY DISEASE INVOLVING NATIVE CORONARY ARTERY OF NATIVE HEART WITHOUT ANGINA PECTORIS: Chronic | ICD-10-CM

## 2022-09-12 PROCEDURE — 99443 PR PHYS/QHP TELEPHONE EVALUATION 21-30 MIN: CPT | Performed by: INTERNAL MEDICINE

## 2022-09-12 RX ORDER — CLOPIDOGREL BISULFATE 75 MG/1
75 TABLET ORAL DAILY
Qty: 90 TABLET | Refills: 1 | Status: SHIPPED | OUTPATIENT
Start: 2022-09-12

## 2022-09-12 NOTE — PROGRESS NOTES
Cardiology Clinic Note  Dev Lawton MD, PhD    Subjective:     Encounter Date:09/12/2022      Patient ID: Jamil Self is a 66 y.o. male.    Chief Complaint:  Chief Complaint   Patient presents with   • Coronary Artery Disease   • Hypertension   • Hyperlipidemia   • Diabetes   • Follow-up   • telephone visit       HPI:    Verbal consent for telehealth obtained today  Unable to complete visit using a video connection to the patient. A phone visit was used to complete this visits. Total time of discussion was 18 minutes with additional time and charting coordination of care for total encounter time greater than 25 minutes.     Previously  I the pleasure to see this 66-year-old gentleman with history of MI acute coronary syndrome, diabetes hypertension hyperlipidemia obstructive sleep apnea left heart cath was in March of last year with intervention SVG to RCA, remote bypass in the 1990s.  Currently still smokes was counseled to quit.  Still complains of uncontrolled hypertension, heart rates are in the 90s, EKG today reveals sinus rhythm to sinus tachycardia poor R wave progression anteriorly nonspecific ST-T wave abnormality.  He denies any chest pain but complains of lower extremity edema and some degree of dyspnea on exertion.  His last cardiology follow-up was in October of last year.  History of bypass surgery LIMA to LAD, SVG to RCA and marginal branch, he received 4.0 x 33 Xience drug-eluting stent to the SVG graft to the RCA, EF preserved overall at 57% historically.  No additional anginal chest pain unexplained syncope or palpitations     After last clinic we ordered venous reflux studies which she has significant venous reflux of the right lower extremity.  His blood pressure remains uncontrolled.  His creatinine was elevated previously but not does not see nephrology.  No other complaints otherwise     Plan today, repeat BMP now to evaluate for ARB therapy  Start losartan 25 daily  BMP repeat in a  week to evaluate for changes in potassium and renal function  Refer to vascular surgery for venous insufficiency right lower extremity which is progressive  Referral to nephrology with history of CKD on top of diabetes and hypertension     Review of systems otherwise negative x14 point review of systems except was mentioned above        Historical data copied forward from previous encounters in EMR including the history, exam, and assessment/plan has been reviewed and is unchanged unless noted otherwise.     Cardiac medicines reviewed with risk, benefits, and necessity of each discussed.     Risk and benefit of cardiac testing reviewed including death heart attack stroke pain bleeding infection need for vascular /cardiovascular surgery were discussed and the patient      Objective:         Objective       160 systolic  HR 60s       No exam today  Assessment:         Assessment          Diagnoses and all orders for this visit:     1. Bilateral lower extremity edema (Primary)  -     Venous w Reflux Lower Extremity - Bilateral CAR; Future  Abnormal  Refer to vascular surgery for right lower extremity venous reflux disease significantly     Uncontrolled hypertension 160 systolic  Losartan increased to 50 mg po daily  Recheck BMP with creat  Refer to Dr. Jeffers for CKD in the setting of diabetes and hypertension              Plan:         ASSESSMENT AND PLAN:   History of acute coronary syndrome  Atherogenic dyslipidemia  CAD  Unstable angina  Status post PCI SVG to RCA  Lower extremity edema  Varicosities     Antiplatelet therapy with aspirin and Plavix  High intensity statin therapy  Coreg twice daily  Losartan cont 25 daily, remains on amlodipine, consider decreasing with lower extremity edema  Continue diabetes medicines  Afterload reduction with amlodipine, evaluate increases in losartan with remaining uncontrolled blood pressure still at 150 systolic  Refill Plavix     Abnormal reflux studies  Deep venous reflux  "in the right femoral vein, right saphenofemoral junction and great saphenous vein  Abnormal reflux studies, refer to vascular surgery for evaluation of ablation        1 week follow-up     The pleasure to be involved in this patient's cardiovascular care.  Please call with any questions or concerns  Dev Lawton MD, PhD        Historical data copied forward from previous encounters in EMR including the history, exam, and assessment/plan has been reviewed and is unchanged unless noted otherwise.    Cardiac medicines reviewed with risk, benefits, and necessity of each discussed.    Risk and benefit of cardiac testing reviewed including death heart attack stroke pain bleeding infection need for vascular /cardiovascular surgery were discussed and the patient     Objective:         Ht 182.9 cm (72.01\")   Wt 120 kg (265 lb)   BMI 35.93 kg/m²     Physical Exam    Assessment:         Diagnoses and all orders for this visit:    1. Coronary artery disease involving native coronary artery of native heart without angina pectoris (Primary)    2. Essential hypertension    3. Mixed hyperlipidemia    4. Controlled type 2 diabetes mellitus without complication, without long-term current use of insulin (HCC)           Plan:              The pleasure to be involved in this patient's cardiovascular care.  Please call with any questions or concerns  Dev Lawton MD, PhD    Most recent EKG as reviewed and interpreted by me:  Procedures     Most recent echo as reviewed and interpreted by me:      Most recent stress test as reviewed and interpreted by me:  Results for orders placed during the hospital encounter of 03/20/21    Stress Test With Myocardial Perfusion One Day    Interpretation Summary  · Left ventricular ejection fraction is normal. (Calculated EF = 57%).  · Myocardial perfusion imaging indicates a medium-sized, moderate-to-severe area of ischemia located in the inferior wall.  · Impressions are consistent with an " intermediate risk study.  · There is no prior study available for comparison. Abnormal nuclear imaging Normal resting study with normal perfusion Inferolateral reversibility moderate to severe diminished counts, moderate size, concern for inferolateral ischemia Preserved EF 57%.  · Findings consistent with an abnormal ECG stress test.    Procedure: Supervision of Lexiscan Nuclear Stress Test    Informed consent was obtained. Baseline EKG shows SR with T wave inversion lateral limb leads. Patient received 0.4 mg IV lexiscan per protocol and immediately followed by cardiolite injection with nuclear images to follow. EKG during lexiscan showed mild ST depression lateral limb leads. Patient c/o chest pain rated 6/10 though patient appeared more uncomfortable than his stated rating.  This persisted 6 minutes into recovery and therefore 1 SL nitro was given.  Patient's chest pain lowered to 3/10 and ST depression on EKG resolved. Please correlate with nuclear images for test results.    Stress testing was supervised by Anh ELISE.    Abnormal study  ST depression in 1 and aVL, abnormal stress ECG  Nuclear images have inferolateral reversibility suggestive of ischemia  Preserved EF 57%    Abnormal study      Most recent cardiac catheterization as reviewed interpreted by me:  Results for orders placed during the hospital encounter of 03/20/21    Cardiac Catheterization/Vascular Study    Narrative  Date of procedure: 3/21/2021  Estimated blood loss: 10 cc  Specimens removed: None    Indication for procedure:    1.  Unstable angina  2.  Positive stress test showing inferior wall ischemia  3.  Multiple coronary artery risk factors with known CAD status post CABG with LIMA to the LAD SVG to right coronary artery and SVG to obtuse marginal branch of left circumflex artery.    Procedures performed:    1.  Native coronary arteriography  2.  Left Heart catheterization  3.  Left ventriculography  4.  Selective angiography  of the LIMA to the LAD  5.  Selective angiography of the SVG graft  6.  Percutaneous coronary intervention involving drug-eluting stent placement to the SVG graft to the right coronary artery (4.0 x 33 mm Xience drug-eluting stent)    Description of procedure:    Patient had the risks and benefits of the procedure explained to them in detail after which informed consent was obtained.  Patient was then brought to the cardiac catheterization lab and placed on the table in supine position.  Next the right groin was prepped and draped in sterile fashion.  Next using modified Seldinger technique and a 21-gauge micro puncture needle, the femoral artery was cannulated without difficulty and a 6 Danish sheath was inserted and flushed with heparinized saline.  Next using diagnostic 6 Danish JR4 and JL4 catheters, each advanced over an 0.035 guidewire to the level the aortic root and then used to selectively engage their respective coronary ostia, multiple cineangiographic images were obtained all the appropriate projections using hand-injection of nonionic contrast material.  Before removal of the JR4 catheter the JR4 catheter used to cross the aortic valve into the left ventricle for left heart catheterization left ventriculography.  The catheter was then pulled back and removed.    The obvious culprit given the stress test was identified as the 90% stenosis in the mid SVG graft to the right coronary artery.  Therefore using a 6 Danish multipurpose guid and a run-through wire we intubated and wired the vessel respectively placing the wire into the distal vessel direct stenting was performed using 4.0 x 33 mm Xience drug-eluting stent postdilated to greater than 4.5 mm with a 4.5 mm NC trek balloon at 20 justin along the stented segment.  We achieved an excellent angiographic result with preserved RYLIE-3 flow (RYLIE-3 prior RYLIE-3 post) with 0% residual stenosis in the stented segment.  The guide catheter and wire were then  removed.    Patient tolerated the procedure well there were no complications.    During the case, conscious sedation monitoring was 60 min.    At the end of the case a 6 Irish Angio-Seal device was deployed in the right groin for right groin hemostasis    Findings    Left heart catheterization:    1.  Opening aortic pressure was 134/63 mmHg the mean pressure of 92 mmHg  2.  The left ventricular end-diastolic pressure at end expiration was 30 mmHg  3.  There is no gradient across aortic valve on pullback    Left ventriculography    The overall estimated left ventricular ejection fraction was 50%    Native coronary arteriography: Right dominant    1.  Left main coronary arteries a large-caliber vessel gives rise left anterior descending left circumflex flex arteries.  There is a distal tapering 70% lesion into the circumflex artery.  2.  Left anterior descending artery is a large-caliber vessel that gives rise to large caliber diagonal branches as it courses along the anterior interventricular groove and wraps around the apex.  There is an ostial 80% stenosis after which the LAD enlarges and is occluded at its midportion chronically totally prior to giving off a large diagonal branch which is moderately diffusely diseased.  3.  The left circumflex arteries a large-caliber vessel gives rise to large caliber bifurcating obtuse marginal branches.  The ostial proximal circumflex itself has a 70 to 80% stenosis after which there is a first and second obtuse marginal branch the first of which has not been revascularized the second of which has a dead and limb from the SVG graft to the OM with a diffuse 90% stenosis to this vessel.  There are left to left collaterals reconstituting this vessel and attempting to reconstitute this vessel distally.  4.  The right coronary arteries a large-caliber vessel that is severely diffusely diseased before becoming chronically totally occluded at its midportion and distal  portions.    The aorto SVG graft to the obtuse marginal branch is stump occluded at its ostium    The aorto SVG graft to the right coronary artery is widely patent at its ostium but has a 90% narrowing in its midportion after which it is widely patent throughout the remaining graft into the anastomosis after which there is a concentric 40% lesion and otherwise no significant atherosclerotic disease present.    The LIMA to the LAD is widely patent in its ostium throughout its course and at its anastomosis with the native vessel; however after the anastomosis in the midportion of the vessel there is a diffuse 60 to 65% diffuse stenosis in a vessel that is 1.5 to 2 mm in diameter.    Conclusions:    1.  Severely elevated left heart filling pressures with preserved LV systolic function  2.  Normal epicardial anatomy with three-vessel coronary artery disease  3.  Widely patent LIMA to the LAD after which there is borderline severe disease in the LAD albeit in a small caliber vessel.  4.  Stump occlusion of the SVG to the obtuse marginal branch which leaves the circumflex artery on revascularized at this time.  The patient did not have myocardial ischemia in this distribution.  Therefore we will treat this conservatively unless symptoms arise.  5.  Patent SVG to right coronary artery with an intervening critical lesion as described above.  6.  Successful PCI and stenting to the SVG to the right coronary artery    Recommendations:    Optimize medical therapy for secondary prevention of ischemic heart disease.    The following portions of the patient's history were reviewed and updated as appropriate: allergies, current medications, past family history, past medical history, past social history, past surgical history and problem list.      ROS:  14 point review of systems negative except as mentioned above    Current Outpatient Medications:   •  amLODIPine (NORVASC) 10 MG tablet, Take 1 tablet by mouth Daily., Disp: , Rfl:    •  aspirin 81 MG EC tablet, Take 1 tablet by mouth Daily., Disp: 30 tablet, Rfl: 0  •  atorvastatin (LIPITOR) 20 MG tablet, Take 20 mg by mouth Daily., Disp: , Rfl:   •  carvedilol (COREG) 6.25 MG tablet, TAKE 1 TABLET BY MOUTH TWICE A DAY, Disp: 60 tablet, Rfl: 5  •  clopidogrel (PLAVIX) 75 MG tablet, Take 1 tablet by mouth Daily., Disp: 90 tablet, Rfl: 1  •  losartan (Cozaar) 25 MG tablet, Take 1 tablet by mouth Daily., Disp: 90 tablet, Rfl: 1  •  metFORMIN (GLUCOPHAGE) 1000 MG tablet, Take 500 mg by mouth 2 (Two) Times a Day With Meals., Disp: , Rfl:     Problem List:  Patient Active Problem List   Diagnosis   • Essential hypertension   • Diabetes mellitus (HCC)   • Mixed hyperlipidemia   • Tobacco abuse   • Coronary artery disease involving native coronary artery of native heart without angina pectoris   • LIZETTE (obstructive sleep apnea)   • Obesity (BMI 30-39.9)   • CKD (chronic kidney disease) stage 3, GFR 30-59 ml/min (HCC)   • Acute bronchitis due to Rhinovirus   • Controlled type 2 diabetes mellitus without complication, without long-term current use of insulin (HCC)   • Dyslipidemia   • Hx of CABG   • Tobacco dependence   • Hyponatremia     Past Medical History:  Past Medical History:   Diagnosis Date   • Coronary artery disease    • Diabetes mellitus (HCC)    • Hyperlipidemia    • Hypertension    • LIZETTE (obstructive sleep apnea)      Past Surgical History:  Past Surgical History:   Procedure Laterality Date   • CARDIAC CATHETERIZATION N/A 03/21/2021    Procedure: Left Heart Cath;  Surgeon: Jourdan Pillai MD;  Location: [facility] INVASIVE LOCATION;  Service: Cardiology;  Laterality: N/A;   • CAROTID STENT     • CORONARY STENT PLACEMENT       Social History:  Social History     Socioeconomic History   • Marital status:    Tobacco Use   • Smoking status: Current Every Day Smoker     Packs/day: 1.00     Types: Cigarettes   • Smokeless tobacco: Never Used   Vaping Use   • Vaping Use: Never  used   Substance and Sexual Activity   • Alcohol use: Never   • Drug use: Never   • Sexual activity: Defer     Allergies:  No Known Allergies  Immunizations:    There is no immunization history on file for this patient.         In-Office Procedure(s):  No orders to display        ASCVD RIsk Score::  The ASCVD Risk score ([place] SCHILLING Jr., et al., 2013) failed to calculate for the following reasons:    The valid total cholesterol range is 130 to 320 mg/dL    Imaging:    Results for orders placed during the hospital encounter of 12/21/21    XR Chest 1 View    Narrative  DATE OF EXAM:  12/21/2021 11:16 PM    PROCEDURE:  XR CHEST 1 VW-    INDICATIONS:  soa    COMPARISON:  3/20/2021    TECHNIQUE:  Portable chest radiograph.    FINDINGS:  Postsurgical changes of sternotomy and CABG noted. The patient is  slightly rotated to the right. No focal consolidation, pneumothorax, or  large pleural effusion. Osseous structures grossly intact.    Impression  No acute process.    Electronically Signed By-Vitaliy Zafar MD On:12/22/2021 7:07 AM  This report was finalized on [id number] by  Vitaliy Zafar MD.       Results for orders placed during the hospital encounter of 12/21/21    US Renal Bilateral    Narrative  Examination: US RENAL BILATERAL-    Date of Exam: 12/23/2021 10:54 AM    Indication: Acute kidney injury; J20.6-Acute bronchitis due to  Rhinovirus; J96.01-Acute respiratory failure with hypoxia;  Z20.822-Contact with and (suspected) exposure to covid-19.    Comparison: None available.    Technique: Grayscale and color Doppler ultrasound evaluation of the  kidneys and urinary bladder was performed    Findings:  The right kidney measures 12.1 x 7.1 x 5.3 cm and the left kidney  measures 10.4 x 4.6 x 4.6 cm. Kidney echogenicity and vascularity appear  within normal limits. There is no solid kidney mass.  No echogenic  shadowing stone.  No hydronephrosis.      Limited visualization of the urinary bladder is  unremarkable.    Impression  Unremarkable renal ultrasound with no evidence of hydronephrosis.    Electronically Signed By-Ezequiel Hedrick MD On:12/23/2021 11:47 AM  This report was finalized on [id number] by  Ezequiel Hedrick MD.          Lab Review:   Lab on 09/07/2022   Component Date Value   • Glucose 09/07/2022 113 (A)   • BUN 09/07/2022 15    • Creatinine 09/07/2022 1.31 (A)   • Sodium 09/07/2022 137    • Potassium 09/07/2022 4.7    • Chloride 09/07/2022 102    • CO2 09/07/2022 26.7    • Calcium 09/07/2022 9.2    • BUN/Creatinine Ratio 09/07/2022 11.5    • Anion Gap 09/07/2022 8.3    • eGFR 09/07/2022 59.7 (A)   Hospital Outpatient Visit on 08/08/2022   Component Date Value   • Target HR (85%) 08/08/2022 130    • Max. Pred. HR (100%) 08/08/2022 153    • Right Common Femoral Spo* 08/08/2022 Y    • Right Common Femoral Pha* 08/08/2022 Y    • Right Common Femoral Aug* 08/08/2022 Y    • Right Common Femoral Com* 08/08/2022 N    • Right Common Femoral Com* 08/08/2022 C    • Right Proximal Femoral C* 08/08/2022 C    • Right Mid Femoral Spont 08/08/2022 Y    • Right Mid Femoral Phasic 08/08/2022 Y    • Right Mid Femoral Augment 08/08/2022 Y    • Right Mid Femoral Compet* 08/08/2022 Y    • Right Mid Femoral Compre* 08/08/2022 C    • Right Distal Femoral Com* 08/08/2022 C    • Right Popliteal Spont 08/08/2022 Y    • Right Popliteal Phasic 08/08/2022 Y    • Right Popliteal Augment 08/08/2022 Y    • Right Popliteal Competent 08/08/2022 Y    • Right Popliteal Compress 08/08/2022 C    • Right Gastronemius Compr* 08/08/2022 C    • BH CV LOW VAS RIGHT COMM* 08/08/2022 1    • Rt. SFJ chinchilla diam 08/08/2022 0.36    • CFV reflux dur 08/08/2022 5.56    • Rt. SFJ reflux time 08/08/2022 6.14    • BH CV RIGHT LOWER VAS GS* 08/08/2022 0.41    • BH CV RIGHT LOWER VAS GS* 08/08/2022 1.12    • BH CV RIGHT LOWER VAS GS* 08/08/2022 0.45    • BH CV RIGHT LOWER VAS GS* 08/08/2022 0.36    • BH CV RIGHT LOWER VAS GS* 08/08/2022 6.05    • BH CV  RIGHT LOWER VAS SS* 08/08/2022 0.17    • BH CV LOW VAS RIGHT GSV * 08/08/2022 1      Recent labs reviewed and interpreted for clinical significance and application            Level of Care:           Dev Lawton MD  09/12/22  .

## 2022-09-14 ENCOUNTER — LAB (OUTPATIENT)
Dept: LAB | Facility: HOSPITAL | Age: 67
End: 2022-09-14

## 2022-09-14 DIAGNOSIS — E78.2 MIXED HYPERLIPIDEMIA: ICD-10-CM

## 2022-09-14 PROCEDURE — 36415 COLL VENOUS BLD VENIPUNCTURE: CPT

## 2022-09-14 PROCEDURE — 80048 BASIC METABOLIC PNL TOTAL CA: CPT

## 2022-09-15 LAB
ANION GAP SERPL CALCULATED.3IONS-SCNC: 6 MMOL/L (ref 5–15)
BUN SERPL-MCNC: 19 MG/DL (ref 8–23)
BUN/CREAT SERPL: 13.8 (ref 7–25)
CALCIUM SPEC-SCNC: 9.6 MG/DL (ref 8.6–10.5)
CHLORIDE SERPL-SCNC: 102 MMOL/L (ref 98–107)
CO2 SERPL-SCNC: 33 MMOL/L (ref 22–29)
CREAT SERPL-MCNC: 1.38 MG/DL (ref 0.76–1.27)
EGFRCR SERPLBLD CKD-EPI 2021: 56 ML/MIN/1.73
GLUCOSE SERPL-MCNC: 117 MG/DL (ref 65–99)
POTASSIUM SERPL-SCNC: 4.4 MMOL/L (ref 3.5–5.2)
SODIUM SERPL-SCNC: 141 MMOL/L (ref 136–145)

## 2022-09-16 ENCOUNTER — TELEPHONE (OUTPATIENT)
Dept: CARDIOLOGY | Facility: CLINIC | Age: 67
End: 2022-09-16

## 2022-09-16 NOTE — TELEPHONE ENCOUNTER
Patient lab work looked ok keep follow up appt to discuss in detail      Hub can release this information

## 2022-09-20 ENCOUNTER — OFFICE VISIT (OUTPATIENT)
Dept: CARDIOLOGY | Facility: CLINIC | Age: 67
End: 2022-09-20

## 2022-09-20 VITALS
SYSTOLIC BLOOD PRESSURE: 150 MMHG | BODY MASS INDEX: 37.25 KG/M2 | HEIGHT: 72 IN | DIASTOLIC BLOOD PRESSURE: 80 MMHG | WEIGHT: 275 LBS | HEART RATE: 58 BPM | RESPIRATION RATE: 20 BRPM

## 2022-09-20 DIAGNOSIS — I87.2 VENOUS INSUFFICIENCY: Primary | ICD-10-CM

## 2022-09-20 DIAGNOSIS — I10 ESSENTIAL HYPERTENSION: ICD-10-CM

## 2022-09-20 PROCEDURE — 99214 OFFICE O/P EST MOD 30 MIN: CPT | Performed by: INTERNAL MEDICINE

## 2022-09-20 RX ORDER — NEBIVOLOL 10 MG/1
10 TABLET ORAL DAILY
COMMUNITY
End: 2022-12-02

## 2022-09-20 RX ORDER — LOSARTAN POTASSIUM 100 MG/1
100 TABLET ORAL DAILY
Qty: 90 TABLET | Refills: 3 | Status: SHIPPED | OUTPATIENT
Start: 2022-09-20

## 2022-09-20 NOTE — PROGRESS NOTES
Cardiology Clinic Note  Dev Lawton MD, PhD    Subjective:     Encounter Date:09/20/2022      Patient ID: Jamil Self is a 66 y.o. male.    Chief Complaint:  Chief Complaint   Patient presents with   • Follow-up       HPI:      Previously  I the pleasure to see this 66-year-old gentleman with history of MI acute coronary syndrome, diabetes hypertension hyperlipidemia obstructive sleep apnea left heart cath was in March of last year with intervention SVG to RCA, remote bypass in the 1990s.  Currently still smokes was counseled to quit.  Still complains of uncontrolled hypertension, heart rates are in the 90s, EKG today reveals sinus rhythm to sinus tachycardia poor R wave progression anteriorly nonspecific ST-T wave abnormality.  He denies any chest pain but complains of lower extremity edema and some degree of dyspnea on exertion.  His last cardiology follow-up was in October of last year.  History of bypass surgery LIMA to LAD, SVG to RCA and marginal branch, he received 4.0 x 33 Xience drug-eluting stent to the SVG graft to the RCA, EF preserved overall at 57% historically.  No additional anginal chest pain unexplained syncope or palpitations     Venous reflux studies on follow-up revealed right lower extremity venous reflux, he continues to have some dyspnea exertion and control blood pressures.  He has no chest pain or shortness of breath that is new or worsening with chronic debility, NYHA class II status CCS class I.  No recurrent anginal chest pain       Review of systems otherwise negative x14 point review of systems except was mentioned above        Historical data copied forward from previous encounters in EMR including the history, exam, and assessment/plan has been reviewed and is unchanged unless noted otherwise.     Cardiac medicines reviewed with risk, benefits, and necessity of each discussed.     Risk and benefit of cardiac testing reviewed including death heart attack stroke pain bleeding  infection need for vascular /cardiovascular surgery were discussed and the patient      Objective:         Objective       155 systolic today, heart rate 70s  Regular rate and rhythm no rubs gallops heave or lift  No clubbing cyanosis with 2+ edema right lower extremity 1+ edema left lower extremity  To be soft nontender nondistended  Clear to auscultation  Radial pulses 2+ equal bilaterally       Assessment:         Assessment          Diagnoses and all orders for this visit:     1. Bilateral lower extremity edema (Primary)  -     Venous w Reflux Lower Extremity - Bilateral CAR; Future  Abnormal  Refer to vascular surgery for right lower extremity venous reflux disease significantly     Uncontrolled hypertension 160 systolic  Losartan increased to 100  Recheck BMP with creat 2 weeks  Refer to Dr. Jeffers for CKD in the setting of diabetes and hypertension, will see soon in the next 2 to 4 weeks              Plan:         ASSESSMENT AND PLAN:   History of acute coronary syndrome  Atherogenic dyslipidemia  CAD  Unstable angina  Status post PCI SVG to RCA  Lower extremity edema  Varicosities     Antiplatelet therapy with aspirin and Plavix  High intensity statin therapy  Coreg twice daily  Losartan cont 25 daily, remains on amlodipine dose previously cut in half to diminish edema which remains present right greater than left with venous insufficiency as documented  Continue diabetes medicines  Afterload reduction with amlodipine, evaluate increases in losartan increased to 100 daily  Refill Plavix     Abnormal reflux studies  Deep venous reflux in the right femoral vein, right saphenofemoral junction and great saphenous vein  Abnormal reflux studies, refer to vascular surgery for evaluation of ablation        Return to clinic in 30 days  BMP in 2 weeks  Increase losartan to 100 daily  Continue other medicines at this time    Discontinue nebivolol with duplicate carvedilol and position which she is actively  "taken     The pleasure to be involved in this patient's cardiovascular care.  Please call with any questions or concerns  Dev Lawton MD, PhD      Historical data copied forward from previous encounters in EMR including the history, exam, and assessment/plan has been reviewed and is unchanged unless noted otherwise.    Cardiac medicines reviewed with risk, benefits, and necessity of each discussed.    Risk and benefit of cardiac testing reviewed including death heart attack stroke pain bleeding infection need for vascular /cardiovascular surgery were discussed and the patient     Objective:         /80   Pulse 58   Resp 20   Ht 182.9 cm (72\")   Wt 125 kg (275 lb)   BMI 37.30 kg/m²     Physical Exam    Assessment:         Diagnoses and all orders for this visit:    1. Venous insufficiency (Primary)  -     Ambulatory Referral to Vascular Surgery    2. Essential hypertension  -     losartan (Cozaar) 100 MG tablet; Take 1 tablet by mouth Daily.  Dispense: 90 tablet; Refill: 3  -     Basic Metabolic Panel; Future           Plan:              The pleasure to be involved in this patient's cardiovascular care.  Please call with any questions or concerns  Dev Lawton MD, PhD    Most recent EKG as reviewed and interpreted by me:  Procedures     Most recent echo as reviewed and interpreted by me:      Most recent stress test as reviewed and interpreted by me:  Results for orders placed during the hospital encounter of 03/20/21    Stress Test With Myocardial Perfusion One Day    Interpretation Summary  · Left ventricular ejection fraction is normal. (Calculated EF = 57%).  · Myocardial perfusion imaging indicates a medium-sized, moderate-to-severe area of ischemia located in the inferior wall.  · Impressions are consistent with an intermediate risk study.  · There is no prior study available for comparison. Abnormal nuclear imaging Normal resting study with normal perfusion Inferolateral reversibility moderate " to severe diminished counts, moderate size, concern for inferolateral ischemia Preserved EF 57%.  · Findings consistent with an abnormal ECG stress test.    Procedure: Supervision of Lexiscan Nuclear Stress Test    Informed consent was obtained. Baseline EKG shows SR with T wave inversion lateral limb leads. Patient received 0.4 mg IV lexiscan per protocol and immediately followed by cardiolite injection with nuclear images to follow. EKG during lexiscan showed mild ST depression lateral limb leads. Patient c/o chest pain rated 6/10 though patient appeared more uncomfortable than his stated rating.  This persisted 6 minutes into recovery and therefore 1 SL nitro was given.  Patient's chest pain lowered to 3/10 and ST depression on EKG resolved. Please correlate with nuclear images for test results.    Stress testing was supervised by Anh ELISE.    Abnormal study  ST depression in 1 and aVL, abnormal stress ECG  Nuclear images have inferolateral reversibility suggestive of ischemia  Preserved EF 57%    Abnormal study      Most recent cardiac catheterization as reviewed interpreted by me:  Results for orders placed during the hospital encounter of 03/20/21    Cardiac Catheterization/Vascular Study    Narrative  Date of procedure: 3/21/2021  Estimated blood loss: 10 cc  Specimens removed: None    Indication for procedure:    1.  Unstable angina  2.  Positive stress test showing inferior wall ischemia  3.  Multiple coronary artery risk factors with known CAD status post CABG with LIMA to the LAD SVG to right coronary artery and SVG to obtuse marginal branch of left circumflex artery.    Procedures performed:    1.  Native coronary arteriography  2.  Left Heart catheterization  3.  Left ventriculography  4.  Selective angiography of the LIMA to the LAD  5.  Selective angiography of the SVG graft  6.  Percutaneous coronary intervention involving drug-eluting stent placement to the SVG graft to the right coronary  artery (4.0 x 33 mm Xience drug-eluting stent)    Description of procedure:    Patient had the risks and benefits of the procedure explained to them in detail after which informed consent was obtained.  Patient was then brought to the cardiac catheterization lab and placed on the table in supine position.  Next the right groin was prepped and draped in sterile fashion.  Next using modified Seldinger technique and a 21-gauge micro puncture needle, the femoral artery was cannulated without difficulty and a 6 Jordanian sheath was inserted and flushed with heparinized saline.  Next using diagnostic 6 Jordanian JR4 and JL4 catheters, each advanced over an 0.035 guidewire to the level the aortic root and then used to selectively engage their respective coronary ostia, multiple cineangiographic images were obtained all the appropriate projections using hand-injection of nonionic contrast material.  Before removal of the JR4 catheter the JR4 catheter used to cross the aortic valve into the left ventricle for left heart catheterization left ventriculography.  The catheter was then pulled back and removed.    The obvious culprit given the stress test was identified as the 90% stenosis in the mid SVG graft to the right coronary artery.  Therefore using a 6 Jordanian multipurpose guid and a run-through wire we intubated and wired the vessel respectively placing the wire into the distal vessel direct stenting was performed using 4.0 x 33 mm Xience drug-eluting stent postdilated to greater than 4.5 mm with a 4.5 mm NC trek balloon at 20 justin along the stented segment.  We achieved an excellent angiographic result with preserved RYLIE-3 flow (RYLIE-3 prior RYLIE-3 post) with 0% residual stenosis in the stented segment.  The guide catheter and wire were then removed.    Patient tolerated the procedure well there were no complications.    During the case, conscious sedation monitoring was 60 min.    At the end of the case a 6 Jordanian Angio-Seal  device was deployed in the right groin for right groin hemostasis    Findings    Left heart catheterization:    1.  Opening aortic pressure was 134/63 mmHg the mean pressure of 92 mmHg  2.  The left ventricular end-diastolic pressure at end expiration was 30 mmHg  3.  There is no gradient across aortic valve on pullback    Left ventriculography    The overall estimated left ventricular ejection fraction was 50%    Native coronary arteriography: Right dominant    1.  Left main coronary arteries a large-caliber vessel gives rise left anterior descending left circumflex flex arteries.  There is a distal tapering 70% lesion into the circumflex artery.  2.  Left anterior descending artery is a large-caliber vessel that gives rise to large caliber diagonal branches as it courses along the anterior interventricular groove and wraps around the apex.  There is an ostial 80% stenosis after which the LAD enlarges and is occluded at its midportion chronically totally prior to giving off a large diagonal branch which is moderately diffusely diseased.  3.  The left circumflex arteries a large-caliber vessel gives rise to large caliber bifurcating obtuse marginal branches.  The ostial proximal circumflex itself has a 70 to 80% stenosis after which there is a first and second obtuse marginal branch the first of which has not been revascularized the second of which has a dead and limb from the SVG graft to the OM with a diffuse 90% stenosis to this vessel.  There are left to left collaterals reconstituting this vessel and attempting to reconstitute this vessel distally.  4.  The right coronary arteries a large-caliber vessel that is severely diffusely diseased before becoming chronically totally occluded at its midportion and distal portions.    The aorto SVG graft to the obtuse marginal branch is stump occluded at its ostium    The aorto SVG graft to the right coronary artery is widely patent at its ostium but has a 90% narrowing  in its midportion after which it is widely patent throughout the remaining graft into the anastomosis after which there is a concentric 40% lesion and otherwise no significant atherosclerotic disease present.    The LIMA to the LAD is widely patent in its ostium throughout its course and at its anastomosis with the native vessel; however after the anastomosis in the midportion of the vessel there is a diffuse 60 to 65% diffuse stenosis in a vessel that is 1.5 to 2 mm in diameter.    Conclusions:    1.  Severely elevated left heart filling pressures with preserved LV systolic function  2.  Normal epicardial anatomy with three-vessel coronary artery disease  3.  Widely patent LIMA to the LAD after which there is borderline severe disease in the LAD albeit in a small caliber vessel.  4.  Stump occlusion of the SVG to the obtuse marginal branch which leaves the circumflex artery on revascularized at this time.  The patient did not have myocardial ischemia in this distribution.  Therefore we will treat this conservatively unless symptoms arise.  5.  Patent SVG to right coronary artery with an intervening critical lesion as described above.  6.  Successful PCI and stenting to the SVG to the right coronary artery    Recommendations:    Optimize medical therapy for secondary prevention of ischemic heart disease.    The following portions of the patient's history were reviewed and updated as appropriate: allergies, current medications, past family history, past medical history, past social history, past surgical history and problem list.      ROS:  14 point review of systems negative except as mentioned above    Current Outpatient Medications:   •  amLODIPine (NORVASC) 10 MG tablet, Take 1 tablet by mouth Daily., Disp: , Rfl:   •  aspirin 81 MG EC tablet, Take 1 tablet by mouth Daily., Disp: 30 tablet, Rfl: 0  •  atorvastatin (LIPITOR) 20 MG tablet, Take 20 mg by mouth Daily., Disp: , Rfl:   •  carvedilol (COREG) 6.25 MG  tablet, TAKE 1 TABLET BY MOUTH TWICE A DAY, Disp: 60 tablet, Rfl: 5  •  clopidogrel (PLAVIX) 75 MG tablet, Take 1 tablet by mouth Daily., Disp: 90 tablet, Rfl: 1  •  losartan (Cozaar) 100 MG tablet, Take 1 tablet by mouth Daily., Disp: 90 tablet, Rfl: 3  •  metFORMIN (GLUCOPHAGE) 1000 MG tablet, Take 500 mg by mouth 2 (Two) Times a Day With Meals., Disp: , Rfl:   •  nebivolol (BYSTOLIC) 10 MG tablet, Take 10 mg by mouth Daily., Disp: , Rfl:     Problem List:  Patient Active Problem List   Diagnosis   • Essential hypertension   • Diabetes mellitus (HCC)   • Mixed hyperlipidemia   • Tobacco abuse   • Coronary artery disease involving native coronary artery of native heart without angina pectoris   • LIZETTE (obstructive sleep apnea)   • Obesity (BMI 30-39.9)   • CKD (chronic kidney disease) stage 3, GFR 30-59 ml/min (HCC)   • Acute bronchitis due to Rhinovirus   • Controlled type 2 diabetes mellitus without complication, without long-term current use of insulin (HCC)   • Dyslipidemia   • Hx of CABG   • Tobacco dependence   • Hyponatremia     Past Medical History:  Past Medical History:   Diagnosis Date   • Coronary artery disease    • Diabetes mellitus (HCC)    • Hyperlipidemia    • Hypertension    • LIZETTE (obstructive sleep apnea)      Past Surgical History:  Past Surgical History:   Procedure Laterality Date   • CARDIAC CATHETERIZATION N/A 03/21/2021    Procedure: Left Heart Cath;  Surgeon: Jourdan Pillai MD;  Location: [facility] CATH INVASIVE LOCATION;  Service: Cardiology;  Laterality: N/A;   • CAROTID STENT     • CORONARY STENT PLACEMENT       Social History:  Social History     Socioeconomic History   • Marital status:    Tobacco Use   • Smoking status: Current Every Day Smoker     Packs/day: 1.00     Types: Cigarettes   • Smokeless tobacco: Never Used   Vaping Use   • Vaping Use: Never used   Substance and Sexual Activity   • Alcohol use: Never   • Drug use: Never   • Sexual activity: Defer      Allergies:  No Known Allergies  Immunizations:    There is no immunization history on file for this patient.         In-Office Procedure(s):  No orders to display        ASCVD RIsk Score::  The ASCVD Risk score ([place] TONIE Moncada., et al., 2013) failed to calculate for the following reasons:    The valid total cholesterol range is 130 to 320 mg/dL    Imaging:    Results for orders placed during the hospital encounter of 12/21/21    XR Chest 1 View    Narrative  DATE OF EXAM:  12/21/2021 11:16 PM    PROCEDURE:  XR CHEST 1 VW-    INDICATIONS:  soa    COMPARISON:  3/20/2021    TECHNIQUE:  Portable chest radiograph.    FINDINGS:  Postsurgical changes of sternotomy and CABG noted. The patient is  slightly rotated to the right. No focal consolidation, pneumothorax, or  large pleural effusion. Osseous structures grossly intact.    Impression  No acute process.    Electronically Signed By-Vitaliy Zafar MD On:12/22/2021 7:07 AM  This report was finalized on [id number] by  Vitaliy Zafar MD.       Results for orders placed during the hospital encounter of 12/21/21    US Renal Bilateral    Narrative  Examination: US RENAL BILATERAL-    Date of Exam: 12/23/2021 10:54 AM    Indication: Acute kidney injury; J20.6-Acute bronchitis due to  Rhinovirus; J96.01-Acute respiratory failure with hypoxia;  Z20.822-Contact with and (suspected) exposure to covid-19.    Comparison: None available.    Technique: Grayscale and color Doppler ultrasound evaluation of the  kidneys and urinary bladder was performed    Findings:  The right kidney measures 12.1 x 7.1 x 5.3 cm and the left kidney  measures 10.4 x 4.6 x 4.6 cm. Kidney echogenicity and vascularity appear  within normal limits. There is no solid kidney mass.  No echogenic  shadowing stone.  No hydronephrosis.      Limited visualization of the urinary bladder is unremarkable.    Impression  Unremarkable renal ultrasound with no evidence of hydronephrosis.    Electronically Signed By-Ezequiel  MD Floridalma On:12/23/2021 11:47 AM  This report was finalized on [id number] by  Ezequiel Hedrick MD.          Lab Review:   Lab on 09/07/2022   Component Date Value   • Glucose 09/14/2022 117 (A)   • BUN 09/14/2022 19    • Creatinine 09/14/2022 1.38 (A)   • Sodium 09/14/2022 141    • Potassium 09/14/2022 4.4    • Chloride 09/14/2022 102    • CO2 09/14/2022 33.0 (A)   • Calcium 09/14/2022 9.6    • BUN/Creatinine Ratio 09/14/2022 13.8    • Anion Gap 09/14/2022 6.0    • eGFR 09/14/2022 56.0 (A)   • Glucose 09/07/2022 113 (A)   • BUN 09/07/2022 15    • Creatinine 09/07/2022 1.31 (A)   • Sodium 09/07/2022 137    • Potassium 09/07/2022 4.7    • Chloride 09/07/2022 102    • CO2 09/07/2022 26.7    • Calcium 09/07/2022 9.2    • BUN/Creatinine Ratio 09/07/2022 11.5    • Anion Gap 09/07/2022 8.3    • eGFR 09/07/2022 59.7 (A)   Hospital Outpatient Visit on 08/08/2022   Component Date Value   • Target HR (85%) 08/08/2022 130    • Max. Pred. HR (100%) 08/08/2022 153    • Right Common Femoral Spo* 08/08/2022 Y    • Right Common Femoral Pha* 08/08/2022 Y    • Right Common Femoral Aug* 08/08/2022 Y    • Right Common Femoral Com* 08/08/2022 N    • Right Common Femoral Com* 08/08/2022 C    • Right Proximal Femoral C* 08/08/2022 C    • Right Mid Femoral Spont 08/08/2022 Y    • Right Mid Femoral Phasic 08/08/2022 Y    • Right Mid Femoral Augment 08/08/2022 Y    • Right Mid Femoral Compet* 08/08/2022 Y    • Right Mid Femoral Compre* 08/08/2022 C    • Right Distal Femoral Com* 08/08/2022 C    • Right Popliteal Spont 08/08/2022 Y    • Right Popliteal Phasic 08/08/2022 Y    • Right Popliteal Augment 08/08/2022 Y    • Right Popliteal Competent 08/08/2022 Y    • Right Popliteal Compress 08/08/2022 C    • Right Gastronemius Compr* 08/08/2022 C    • BH CV LOW VAS RIGHT COMM* 08/08/2022 1    • Rt. SFJ chinchilla diam 08/08/2022 0.36    • CFV reflux dur 08/08/2022 5.56    • Rt. SFJ reflux time 08/08/2022 6.14    • BH CV RIGHT LOWER VAS GS* 08/08/2022  0.41    • BH CV RIGHT LOWER VAS GS* 08/08/2022 1.12    • BH CV RIGHT LOWER VAS GS* 08/08/2022 0.45    • BH CV RIGHT LOWER VAS GS* 08/08/2022 0.36    • BH CV RIGHT LOWER VAS GS* 08/08/2022 6.05    • BH CV RIGHT LOWER VAS SS* 08/08/2022 0.17    • BH CV LOW VAS RIGHT GSV * 08/08/2022 1      Recent labs reviewed and interpreted for clinical significance and application            Level of Care:           Dev Lawton MD  09/20/22  .

## 2022-11-28 ENCOUNTER — TELEPHONE (OUTPATIENT)
Dept: CARDIOLOGY | Facility: CLINIC | Age: 67
End: 2022-11-28

## 2022-11-28 NOTE — TELEPHONE ENCOUNTER
Caller: Jamil Self    Relationship: Self    Best call back number: [phone]    What is the best time to reach you: TODAY ANYTIME    Who are you requesting to speak with (clinical staff, provider,  specific staff member): CLINICAL        What was the call regarding: CHEST PAINS AND TIGHTENESS ON AND OFF for past week, FEEL LIKE IT DID BEFORE HE RECEIVED STENTS. HAS APPT 12/06/2022. DOES HE NEED TO BE SEEN SOONER?    Do you require a callback: YES

## 2022-12-02 ENCOUNTER — OFFICE VISIT (OUTPATIENT)
Dept: CARDIOLOGY | Facility: CLINIC | Age: 67
End: 2022-12-02

## 2022-12-02 VITALS
DIASTOLIC BLOOD PRESSURE: 91 MMHG | OXYGEN SATURATION: 99 % | SYSTOLIC BLOOD PRESSURE: 172 MMHG | WEIGHT: 279.8 LBS | BODY MASS INDEX: 37.9 KG/M2 | HEART RATE: 78 BPM | HEIGHT: 72 IN

## 2022-12-02 DIAGNOSIS — I25.10 CORONARY ARTERY DISEASE INVOLVING NATIVE CORONARY ARTERY OF NATIVE HEART WITHOUT ANGINA PECTORIS: ICD-10-CM

## 2022-12-02 DIAGNOSIS — I10 ESSENTIAL HYPERTENSION: Chronic | ICD-10-CM

## 2022-12-02 DIAGNOSIS — Z95.1 HX OF CABG: ICD-10-CM

## 2022-12-02 DIAGNOSIS — E78.5 DYSLIPIDEMIA: ICD-10-CM

## 2022-12-02 DIAGNOSIS — R07.89 OTHER CHEST PAIN: Primary | ICD-10-CM

## 2022-12-02 PROCEDURE — 99214 OFFICE O/P EST MOD 30 MIN: CPT | Performed by: NURSE PRACTITIONER

## 2022-12-02 PROCEDURE — 93000 ELECTROCARDIOGRAM COMPLETE: CPT | Performed by: NURSE PRACTITIONER

## 2022-12-02 RX ORDER — ISOSORBIDE MONONITRATE 30 MG/1
30 TABLET, EXTENDED RELEASE ORAL DAILY
Qty: 90 TABLET | Refills: 3 | Status: SHIPPED | OUTPATIENT
Start: 2022-12-02

## 2022-12-27 NOTE — PROGRESS NOTES
Cardiology Office Follow Up Visit      Primary Care Provider:  Desiree Almaguer APRN    Reason for f/u:     Chest pain, atypical      Subjective     CC:    Chest pain, atypical    History of Present Illness       Jamil Self is a 67 y.o. male who is a patient of Dr. Lawton. Pmh includes CAD s/p prior CABG ( LIMA to LAD, SVG to RCA and marginal branch), he underwent drug-eluting stent to the SVG graft to the RCA, EF preserved overall at 57% historically, HTN, HLD, DM, obesity, LIZETTE, tobacco use.    He presents today with complaints of chest discomfort while coughing. He has been sick recently with a viral upper respiratory infection. He has occasional cough and noted discomfort when coughing. He reports when he underwent stenting in 3/2021 his pain was left sided chest pressure which he is not experiencing currently.      We reviewed his medication list. He is currently on coreg and bystolic but is unsure which one he is taking. Per prior record review he should be on coreg.  He is not on nitrates. This discomfort he is experiencing is not exertional. He has no fevers, chills, nausea, vomiting or shortness of breath.         ASSESSMENT/PLAN:      Diagnoses and all orders for this visit:    1. Other chest pain (Primary)    2. Coronary artery disease involving native coronary artery of native heart without angina pectoris    3. Hx of CABG    4. Essential hypertension    5. Dyslipidemia    Other orders  -     isosorbide mononitrate (IMDUR) 30 MG 24 hr tablet; Take 1 tablet by mouth Daily.  Dispense: 90 tablet; Refill: 3        MEDICAL DECISION MAKING:  Mr. Self presents today with chest discomfort when coughing. He is getting over a viral respiratory illness. He is not describing anginal type pain. Pain is not exertional and is different from his prior pain he experienced with his prior stent.   We did review medications. He is on ASA / Plavix statin. He should be taking coreg 6.25mg BID and not bystolic. I will  add Imdur to his regimen. ECG is wihtout acute ischemic changes.    We will see him back at his next scheduled appointment in 1 mo. If he experiences any additional discomfort after his upper respiratory illness resolves, we will consider a stress test.          Past Medical History:   Diagnosis Date   • Coronary artery disease    • Diabetes mellitus (HCC)    • Hyperlipidemia    • Hypertension    • LIZETTE (obstructive sleep apnea)        Past Surgical History:   Procedure Laterality Date   • CARDIAC CATHETERIZATION N/A 03/21/2021    Procedure: Left Heart Cath;  Surgeon: Jourdan Pillai MD;  Location: [facility] CATH INVASIVE LOCATION;  Service: Cardiology;  Laterality: N/A;   • CAROTID STENT     • CORONARY STENT PLACEMENT           Current Outpatient Medications:   •  amLODIPine (NORVASC) 10 MG tablet, Take 1 tablet by mouth Daily., Disp: , Rfl:   •  aspirin 81 MG EC tablet, Take 1 tablet by mouth Daily., Disp: 30 tablet, Rfl: 0  •  atorvastatin (LIPITOR) 20 MG tablet, Take 20 mg by mouth Daily., Disp: , Rfl:   •  carvedilol (COREG) 6.25 MG tablet, TAKE 1 TABLET BY MOUTH TWICE A DAY, Disp: 60 tablet, Rfl: 5  •  clopidogrel (PLAVIX) 75 MG tablet, Take 1 tablet by mouth Daily., Disp: 90 tablet, Rfl: 1  •  losartan (Cozaar) 100 MG tablet, Take 1 tablet by mouth Daily., Disp: 90 tablet, Rfl: 3  •  metFORMIN (GLUCOPHAGE) 1000 MG tablet, Take 500 mg by mouth 2 (Two) Times a Day With Meals., Disp: , Rfl:   •  isosorbide mononitrate (IMDUR) 30 MG 24 hr tablet, Take 1 tablet by mouth Daily., Disp: 90 tablet, Rfl: 3    Social History     Socioeconomic History   • Marital status:    Tobacco Use   • Smoking status: Every Day     Packs/day: 1.00     Types: Cigarettes   • Smokeless tobacco: Never   Vaping Use   • Vaping Use: Never used   Substance and Sexual Activity   • Alcohol use: Never   • Drug use: Never   • Sexual activity: Defer       Family History   Problem Relation Age of Onset   • No Known Problems Mother   "  • No Known Problems Father        The following portions of the patient's history were reviewed and updated as appropriate: allergies, current medications, past family history, past medical history, past social history, past surgical history and problem list.    Review of Systems   Constitutional: Negative for chills, diaphoresis and malaise/fatigue.   Cardiovascular: Positive for chest pain. Negative for dyspnea on exertion, irregular heartbeat, leg swelling, near-syncope, orthopnea, palpitations, paroxysmal nocturnal dyspnea and syncope.   Respiratory: Positive for cough and sputum production. Negative for shortness of breath and sleep disturbances due to breathing.    Gastrointestinal: Negative for change in bowel habit.   Genitourinary: Negative for urgency.   Neurological: Negative for dizziness and headaches.   Psychiatric/Behavioral: Negative for altered mental status.       Pertinent items are noted in HPI, all other systems reviewed and negative    /91   Pulse 78   Ht 182.9 cm (72\")   Wt 127 kg (279 lb 12.8 oz)   SpO2 99%   BMI 37.95 kg/m² .  Objective     Constitutional:       Appearance: Not in distress.   Neck:      Vascular: JVD normal.   Pulmonary:      Effort: Pulmonary effort is normal.      Breath sounds: Normal breath sounds.   Cardiovascular:      Normal rate. Regular rhythm.   Pulses:     Intact distal pulses.   Edema:     Peripheral edema absent.   Abdominal:      General: Bowel sounds are normal.      Palpations: Abdomen is soft.   Musculoskeletal: Normal range of motion. Skin:     General: Skin is warm and dry.   Neurological:      General: No focal deficit present.      Mental Status: Oriented to person, place and time.             ECG 12 Lead    Date/Time: 12/2/2022 9:20 AM  Performed by: Herminia Olmos APRN  Authorized by: Herminia Olmos APRN   Comparison: compared with previous ECG from 8/1/2022  Similar to previous ECG  Rhythm: sinus rhythm  Rate: normal  BPM: 78  Other " findings: non-specific ST-T wave changes            EKG ordered by and reviewed by me in office             Jamil Self  reports that he has been smoking cigarettes. He has been smoking an average of 1 pack per day. He has never used smokeless tobacco.. I have educated him on the risk of diseases from using tobacco products such as cancer, COPD and heart disease.     I advised him to quit and he is not willing to quit.    I spent >10 minutes counseling the patient.

## 2023-01-06 ENCOUNTER — OFFICE VISIT (OUTPATIENT)
Dept: CARDIOLOGY | Facility: CLINIC | Age: 68
End: 2023-01-06
Payer: COMMERCIAL

## 2023-01-06 VITALS
HEIGHT: 72 IN | WEIGHT: 281 LBS | BODY MASS INDEX: 38.06 KG/M2 | HEART RATE: 68 BPM | DIASTOLIC BLOOD PRESSURE: 100 MMHG | SYSTOLIC BLOOD PRESSURE: 186 MMHG | OXYGEN SATURATION: 98 %

## 2023-01-06 DIAGNOSIS — E78.2 MIXED HYPERLIPIDEMIA: ICD-10-CM

## 2023-01-06 DIAGNOSIS — Z95.1 HX OF CABG: ICD-10-CM

## 2023-01-06 DIAGNOSIS — I10 ESSENTIAL HYPERTENSION: Chronic | ICD-10-CM

## 2023-01-06 DIAGNOSIS — N18.30 STAGE 3 CHRONIC KIDNEY DISEASE, UNSPECIFIED WHETHER STAGE 3A OR 3B CKD: ICD-10-CM

## 2023-01-06 DIAGNOSIS — Z09 FOLLOW-UP EXAM: Primary | ICD-10-CM

## 2023-01-06 DIAGNOSIS — I25.10 CORONARY ARTERY DISEASE INVOLVING NATIVE CORONARY ARTERY OF NATIVE HEART WITHOUT ANGINA PECTORIS: ICD-10-CM

## 2023-01-06 PROCEDURE — 93000 ELECTROCARDIOGRAM COMPLETE: CPT | Performed by: NURSE PRACTITIONER

## 2023-01-06 PROCEDURE — 99214 OFFICE O/P EST MOD 30 MIN: CPT | Performed by: NURSE PRACTITIONER

## 2023-01-06 RX ORDER — HYDRALAZINE HYDROCHLORIDE 25 MG/1
25 TABLET, FILM COATED ORAL 2 TIMES DAILY
Qty: 60 TABLET | Refills: 3 | Status: SHIPPED | OUTPATIENT
Start: 2023-01-06 | End: 2023-01-30

## 2023-01-10 DIAGNOSIS — R07.9 CHEST PAIN, UNSPECIFIED TYPE: Primary | ICD-10-CM

## 2023-01-10 NOTE — PROGRESS NOTES
Cardiology Office Follow Up Visit      Primary Care Provider:  Desiree Almaguer APRN    Reason for f/u:     1 month follow up      Subjective     CC:    1 month follow up, chest discomfort     History of Present Illness     Jamil Self is a 67 y.o. male who is a patient of Dr. Lawton. Pmh includes CAD s/p prior CABG ( LIMA to LAD, SVG to RCA and marginal branch), he underwent drug-eluting stent to the SVG graft to the RCA, EF preserved overall at 57% historically, HTN, HLD, DM, obesity, LIZETTE, tobacco use.     He presented in December with complaints of chest discomfort while coughing. He had been sick with a viral upper respiratory infection. He had occasional cough and noted discomfort when coughing. He reports when he underwent stenting in 3/2021 his pain was left sided chest pressure which he was not experiencing.  He was initiated on imdur and was to f/u in 1 mo.     Mr. Self presents today for 1 mo follow up. He still reports some chest tightness at times. His blood pressure is elevated. He is also worried because his kidney numbers are up.  He occasionally experiences dyspnea on exertion as well.     ASSESSMENT/PLAN:      Diagnoses and all orders for this visit:    1. Follow-up exam (Primary)    2. Coronary artery disease involving native coronary artery of native heart without angina pectoris    3. Hx of CABG    4. Essential hypertension    5. Mixed hyperlipidemia    6. Stage 3 chronic kidney disease, unspecified whether stage 3a or 3b CKD ([facility])  -     Ambulatory Referral to Nephrology    Other orders  -     Stress Test With Myocardial Perfusion One Day; Standing  -     hydrALAZINE (APRESOLINE) 25 MG tablet; Take 1 tablet by mouth 2 (Two) Times a Day for 90 days.  Dispense: 60 tablet; Refill: 3  -     Stress Test With Myocardial Perfusion One Day      MEDICAL DECISION MAKING:  Mr. Self presents for 1 mo follow up. He still has some chest tightness and dyspnea on exertion. His blood pressure is  elevated today I will start hydrazine.  I will place a nephrology referral for his CKD.  We will proceed with lexican stress test given his exertional symptoms.     We will see him back in 1 mo. Should symptoms worsen he should present to ER.     Past Medical History:   Diagnosis Date   • Coronary artery disease    • Diabetes mellitus (HCC)    • Hyperlipidemia    • Hypertension    • LIZETTE (obstructive sleep apnea)        Past Surgical History:   Procedure Laterality Date   • CARDIAC CATHETERIZATION N/A 03/21/2021    Procedure: Left Heart Cath;  Surgeon: Jourdan Pillai MD;  Location: [facility] CATH INVASIVE LOCATION;  Service: Cardiology;  Laterality: N/A;   • CAROTID STENT     • CORONARY STENT PLACEMENT           Current Outpatient Medications:   •  amLODIPine (NORVASC) 10 MG tablet, Take 1 tablet by mouth Daily., Disp: , Rfl:   •  aspirin 81 MG EC tablet, Take 1 tablet by mouth Daily., Disp: 30 tablet, Rfl: 0  •  atorvastatin (LIPITOR) 20 MG tablet, Take 20 mg by mouth Daily., Disp: , Rfl:   •  carvedilol (COREG) 6.25 MG tablet, TAKE 1 TABLET BY MOUTH TWICE A DAY, Disp: 60 tablet, Rfl: 5  •  clopidogrel (PLAVIX) 75 MG tablet, Take 1 tablet by mouth Daily., Disp: 90 tablet, Rfl: 1  •  isosorbide mononitrate (IMDUR) 30 MG 24 hr tablet, Take 1 tablet by mouth Daily., Disp: 90 tablet, Rfl: 3  •  losartan (Cozaar) 100 MG tablet, Take 1 tablet by mouth Daily., Disp: 90 tablet, Rfl: 3  •  metFORMIN (GLUCOPHAGE) 1000 MG tablet, Take 500 mg by mouth 2 (Two) Times a Day With Meals., Disp: , Rfl:   •  hydrALAZINE (APRESOLINE) 25 MG tablet, Take 1 tablet by mouth 2 (Two) Times a Day for 90 days., Disp: 60 tablet, Rfl: 3    Social History     Socioeconomic History   • Marital status:    Tobacco Use   • Smoking status: Every Day     Packs/day: 1.00     Types: Cigarettes   • Smokeless tobacco: Never   Vaping Use   • Vaping Use: Never used   Substance and Sexual Activity   • Alcohol use: Never   • Drug use: Never   •  Sexual activity: Defer       Family History   Problem Relation Age of Onset   • No Known Problems Mother    • No Known Problems Father        The following portions of the patient's history were reviewed and updated as appropriate: allergies, current medications, past family history, past medical history, past social history, past surgical history and problem list.    Review of Systems   Constitutional: Negative for chills, diaphoresis and malaise/fatigue.   Cardiovascular: Positive for chest pain and dyspnea on exertion. Negative for irregular heartbeat, leg swelling, near-syncope, orthopnea, palpitations, paroxysmal nocturnal dyspnea and syncope.   Respiratory: Negative for cough, shortness of breath, sleep disturbances due to breathing and sputum production.    Gastrointestinal: Negative for change in bowel habit.   Genitourinary: Negative for urgency.   Neurological: Negative for dizziness and headaches.   Psychiatric/Behavioral: Negative for altered mental status.       Pertinent items are noted in HPI, all other systems reviewed and negative    BP (!) 186/100   Pulse 68   Ht 182.9 cm (72\")   Wt 127 kg (281 lb)   SpO2 98%   BMI 38.11 kg/m² .  Objective     Constitutional:       Appearance: Not in distress.   Neck:      Vascular: JVD normal.   Pulmonary:      Effort: Pulmonary effort is normal.      Breath sounds: Normal breath sounds.   Cardiovascular:      Normal rate. Regular rhythm.   Pulses:     Intact distal pulses.   Edema:     Peripheral edema absent.   Abdominal:      General: Bowel sounds are normal.      Palpations: Abdomen is soft.   Musculoskeletal: Normal range of motion. Skin:     General: Skin is warm and dry.   Neurological:      General: No focal deficit present.      Mental Status: Oriented to person, place and time.             ECG 12 Lead    Date/Time: 1/6/2023 10:53 AM  Performed by: Herminia Olmos APRN  Authorized by: Herminia Olmos APRN   Comparison: compared with previous ECG from  12/2/2022  Similar to previous ECG  Rhythm: sinus rhythm  Rate: normal  BPM: 68  Other findings: non-specific ST-T wave changes            EKG ordered by and reviewed by me in office      Jamilmini Self  reports that he has been smoking cigarettes. He has been smoking an average of 1 pack per day. He has never used smokeless tobacco.. I have educated him on the risk of diseases from using tobacco products such as cancer, COPD and heart disease.     I advised him to quit and he is not willing to quit.    I spent >10 minutes counseling the patient.

## 2023-01-20 ENCOUNTER — HOSPITAL ENCOUNTER (OUTPATIENT)
Dept: NUCLEAR MEDICINE | Facility: HOSPITAL | Age: 68
Discharge: HOME | End: 2023-01-20
Payer: COMMERCIAL

## 2023-01-20 DIAGNOSIS — R07.9 CHEST PAIN, UNSPECIFIED TYPE: ICD-10-CM

## 2023-01-20 PROCEDURE — 25010000002 REGADENOSON 0.4 MG/5ML SOLUTION: Performed by: NURSE PRACTITIONER

## 2023-01-20 PROCEDURE — 93017 CV STRESS TEST TRACING ONLY: CPT

## 2023-01-20 PROCEDURE — 78452 HT MUSCLE IMAGE SPECT MULT: CPT | Performed by: INTERNAL MEDICINE

## 2023-01-20 PROCEDURE — A9502 TC99M TETROFOSMIN: HCPCS | Performed by: NURSE PRACTITIONER

## 2023-01-20 PROCEDURE — 0 TECHNETIUM TETROFOSMIN KIT: Performed by: NURSE PRACTITIONER

## 2023-01-20 PROCEDURE — 78452 HT MUSCLE IMAGE SPECT MULT: CPT

## 2023-01-20 PROCEDURE — 93018 CV STRESS TEST I&R ONLY: CPT | Performed by: INTERNAL MEDICINE

## 2023-01-20 PROCEDURE — 93016 CV STRESS TEST SUPVJ ONLY: CPT | Performed by: INTERNAL MEDICINE

## 2023-01-20 RX ADMIN — TETROFOSMIN 1 DOSE: 1.38 INJECTION, POWDER, LYOPHILIZED, FOR SOLUTION INTRAVENOUS at 09:41

## 2023-01-20 RX ADMIN — TETROFOSMIN 1 DOSE: 1.38 INJECTION, POWDER, LYOPHILIZED, FOR SOLUTION INTRAVENOUS at 11:13

## 2023-01-20 RX ADMIN — REGADENOSON 0.4 MG: 0.08 INJECTION, SOLUTION INTRAVENOUS at 11:13

## 2023-01-22 LAB
BH CV REST NUCLEAR ISOTOPE DOSE: 11 MCI
BH CV STRESS BP STAGE 1: NORMAL
BH CV STRESS BP STAGE 2: NORMAL
BH CV STRESS BP STAGE 3: NORMAL
BH CV STRESS BP STAGE 4: NORMAL
BH CV STRESS COMMENTS STAGE 1: NORMAL
BH CV STRESS COMMENTS STAGE 2: NORMAL
BH CV STRESS DOSE REGADENOSON STAGE 1: 0.4
BH CV STRESS DURATION MIN STAGE 1: 0
BH CV STRESS DURATION MIN STAGE 2: 4
BH CV STRESS DURATION SEC STAGE 1: 10
BH CV STRESS DURATION SEC STAGE 2: 0
BH CV STRESS HR STAGE 1: 79
BH CV STRESS HR STAGE 2: 89
BH CV STRESS HR STAGE 3: 76
BH CV STRESS HR STAGE 4: 71
BH CV STRESS NUCLEAR ISOTOPE DOSE: 33 MCI
BH CV STRESS PROTOCOL 1: NORMAL
BH CV STRESS RECOVERY BP: NORMAL MMHG
BH CV STRESS RECOVERY HR: 68 BPM
BH CV STRESS STAGE 1: 1
BH CV STRESS STAGE 2: 2
BH CV STRESS STAGE 3: 3
BH CV STRESS STAGE 4: 4
LV EF NUC BP: 66 %
MAXIMAL PREDICTED HEART RATE: 152 BPM
PERCENT MAX PREDICTED HR: 58.55 %
STRESS BASELINE BP: NORMAL MMHG
STRESS BASELINE HR: 65 BPM
STRESS PERCENT HR: 69 %
STRESS POST PEAK BP: NORMAL MMHG
STRESS POST PEAK HR: 89 BPM
STRESS TARGET HR: 129 BPM

## 2023-01-23 ENCOUNTER — TELEPHONE (OUTPATIENT)
Dept: CARDIOLOGY | Facility: CLINIC | Age: 68
End: 2023-01-23
Payer: COMMERCIAL

## 2023-01-23 DIAGNOSIS — I20.0 UNSTABLE ANGINA: ICD-10-CM

## 2023-01-23 DIAGNOSIS — R94.39 ABNORMAL STRESS TEST: Primary | ICD-10-CM

## 2023-01-24 PROBLEM — I20.0 UNSTABLE ANGINA: Status: ACTIVE | Noted: 2023-01-24

## 2023-01-24 PROBLEM — R94.39 ABNORMAL STRESS TEST: Status: ACTIVE | Noted: 2023-01-24

## 2023-01-27 ENCOUNTER — LAB (OUTPATIENT)
Dept: LAB | Facility: HOSPITAL | Age: 68
End: 2023-01-27
Payer: COMMERCIAL

## 2023-01-27 ENCOUNTER — HOSPITAL ENCOUNTER (OUTPATIENT)
Facility: HOSPITAL | Age: 68
Discharge: HOME | End: 2023-01-28
Attending: INTERNAL MEDICINE | Admitting: INTERNAL MEDICINE
Payer: COMMERCIAL

## 2023-01-27 DIAGNOSIS — I20.0 UNSTABLE ANGINA: ICD-10-CM

## 2023-01-27 DIAGNOSIS — R94.39 ABNORMAL STRESS TEST: Primary | ICD-10-CM

## 2023-01-27 LAB
ACT BLD: 311 SECONDS (ref 89–137)
ACT BLD: 317 SECONDS (ref 89–137)
ALBUMIN SERPL-MCNC: 4.7 G/DL (ref 3.5–5.2)
ALBUMIN/GLOB SERPL: 1.4 G/DL
ALP SERPL-CCNC: 83 U/L (ref 39–117)
ALT SERPL W P-5'-P-CCNC: 11 U/L (ref 1–41)
ANION GAP SERPL CALCULATED.3IONS-SCNC: 10 MMOL/L (ref 5–15)
AST SERPL-CCNC: 12 U/L (ref 1–40)
BILIRUB SERPL-MCNC: 0.5 MG/DL (ref 0–1.2)
BUN SERPL-MCNC: 16 MG/DL (ref 8–23)
BUN/CREAT SERPL: 11.9 (ref 7–25)
CALCIUM SPEC-SCNC: 9.7 MG/DL (ref 8.6–10.5)
CHLORIDE SERPL-SCNC: 102 MMOL/L (ref 98–107)
CO2 SERPL-SCNC: 26 MMOL/L (ref 22–29)
CREAT SERPL-MCNC: 1.34 MG/DL (ref 0.76–1.27)
DEPRECATED RDW RBC AUTO: 45.1 FL (ref 37–54)
EGFRCR SERPLBLD CKD-EPI 2021: 57.7 ML/MIN/1.73
ERYTHROCYTE [DISTWIDTH] IN BLOOD BY AUTOMATED COUNT: 13.3 % (ref 12.3–15.4)
GLOBULIN UR ELPH-MCNC: 3.3 GM/DL
GLUCOSE BLDC GLUCOMTR-MCNC: 156 MG/DL (ref 70–105)
GLUCOSE BLDC GLUCOMTR-MCNC: 87 MG/DL (ref 70–105)
GLUCOSE SERPL-MCNC: 104 MG/DL (ref 65–99)
HCT VFR BLD AUTO: 42.8 % (ref 37.5–51)
HGB BLD-MCNC: 14.6 G/DL (ref 13–17.7)
INR PPP: 0.94 (ref 0.93–1.1)
MAGNESIUM SERPL-MCNC: 2.1 MG/DL (ref 1.6–2.4)
MCH RBC QN AUTO: 31.4 PG (ref 26.6–33)
MCHC RBC AUTO-ENTMCNC: 34.1 G/DL (ref 31.5–35.7)
MCV RBC AUTO: 92 FL (ref 79–97)
PLATELET # BLD AUTO: 227 10*3/MM3 (ref 140–450)
PMV BLD AUTO: 8.9 FL (ref 6–12)
POTASSIUM SERPL-SCNC: 5 MMOL/L (ref 3.5–5.2)
PROT SERPL-MCNC: 8 G/DL (ref 6–8.5)
PROTHROMBIN TIME: 9.7 SECONDS (ref 9.6–11.7)
RBC # BLD AUTO: 4.65 10*6/MM3 (ref 4.14–5.8)
SODIUM SERPL-SCNC: 138 MMOL/L (ref 136–145)
WBC NRBC COR # BLD: 9.6 10*3/MM3 (ref 3.4–10.8)

## 2023-01-27 PROCEDURE — C1894 INTRO/SHEATH, NON-LASER: HCPCS | Performed by: INTERNAL MEDICINE

## 2023-01-27 PROCEDURE — 25010000002 HEPARIN (PORCINE) PER 1000 UNITS: Performed by: INTERNAL MEDICINE

## 2023-01-27 PROCEDURE — 92937 PRQ TRLUML REVSC CAB GRF 1: CPT | Performed by: INTERNAL MEDICINE

## 2023-01-27 PROCEDURE — 85347 COAGULATION TIME ACTIVATED: CPT

## 2023-01-27 PROCEDURE — 93459 L HRT ART/GRFT ANGIO: CPT | Performed by: INTERNAL MEDICINE

## 2023-01-27 PROCEDURE — 0 IOPAMIDOL PER 1 ML: Performed by: INTERNAL MEDICINE

## 2023-01-27 PROCEDURE — C1725 CATH, TRANSLUMIN NON-LASER: HCPCS | Performed by: INTERNAL MEDICINE

## 2023-01-27 PROCEDURE — C1769 GUIDE WIRE: HCPCS | Performed by: INTERNAL MEDICINE

## 2023-01-27 PROCEDURE — 85610 PROTHROMBIN TIME: CPT

## 2023-01-27 PROCEDURE — 93005 ELECTROCARDIOGRAM TRACING: CPT | Performed by: INTERNAL MEDICINE

## 2023-01-27 PROCEDURE — 25010000002 HYDRALAZINE PER 20 MG: Performed by: INTERNAL MEDICINE

## 2023-01-27 PROCEDURE — C9604 PERC D-E COR REVASC T CABG S: HCPCS | Performed by: INTERNAL MEDICINE

## 2023-01-27 PROCEDURE — C1887 CATHETER, GUIDING: HCPCS | Performed by: INTERNAL MEDICINE

## 2023-01-27 PROCEDURE — 82962 GLUCOSE BLOOD TEST: CPT

## 2023-01-27 PROCEDURE — 99153 MOD SED SAME PHYS/QHP EA: CPT | Performed by: INTERNAL MEDICINE

## 2023-01-27 PROCEDURE — G0378 HOSPITAL OBSERVATION PER HR: HCPCS

## 2023-01-27 PROCEDURE — 25010000002 HYDROMORPHONE 1 MG/ML SOLUTION: Performed by: INTERNAL MEDICINE

## 2023-01-27 PROCEDURE — 25010000002 MIDAZOLAM PER 1 MG: Performed by: INTERNAL MEDICINE

## 2023-01-27 PROCEDURE — 99152 MOD SED SAME PHYS/QHP 5/>YRS: CPT | Performed by: INTERNAL MEDICINE

## 2023-01-27 PROCEDURE — 83735 ASSAY OF MAGNESIUM: CPT

## 2023-01-27 PROCEDURE — C1874 STENT, COATED/COV W/DEL SYS: HCPCS | Performed by: INTERNAL MEDICINE

## 2023-01-27 PROCEDURE — 80053 COMPREHEN METABOLIC PANEL: CPT

## 2023-01-27 PROCEDURE — C1760 CLOSURE DEV, VASC: HCPCS | Performed by: INTERNAL MEDICINE

## 2023-01-27 PROCEDURE — 36415 COLL VENOUS BLD VENIPUNCTURE: CPT

## 2023-01-27 PROCEDURE — C1894 INTRO/SHEATH, NON-LASER: HCPCS

## 2023-01-27 PROCEDURE — 25010000002 FENTANYL CITRATE (PF) 100 MCG/2ML SOLUTION: Performed by: INTERNAL MEDICINE

## 2023-01-27 PROCEDURE — 85027 COMPLETE CBC AUTOMATED: CPT

## 2023-01-27 DEVICE — STNT CORNRY RESOLUTE ONYX RX 2X26MM: Type: IMPLANTABLE DEVICE | Site: CORONARY | Status: FUNCTIONAL

## 2023-01-27 DEVICE — XIENCE SKYPOINT™ EVEROLIMUS ELUTING CORONARY STENT SYSTEM 2.25 MM X 12 MM / RAPID-EXCHANGE
Type: IMPLANTABLE DEVICE | Site: CORONARY | Status: FUNCTIONAL
Brand: XIENCE SKYPOINT™

## 2023-01-27 RX ORDER — HYDRALAZINE HYDROCHLORIDE 25 MG/1
25 TABLET, FILM COATED ORAL 2 TIMES DAILY
Status: DISCONTINUED | OUTPATIENT
Start: 2023-01-27 | End: 2023-01-28 | Stop reason: HOSPADM

## 2023-01-27 RX ORDER — MIDAZOLAM HYDROCHLORIDE 1 MG/ML
INJECTION INTRAMUSCULAR; INTRAVENOUS
Status: DISCONTINUED | OUTPATIENT
Start: 2023-01-27 | End: 2023-01-27 | Stop reason: HOSPADM

## 2023-01-27 RX ORDER — SODIUM CHLORIDE 9 MG/ML
100 INJECTION, SOLUTION INTRAVENOUS CONTINUOUS
Status: DISCONTINUED | OUTPATIENT
Start: 2023-01-27 | End: 2023-01-28 | Stop reason: HOSPADM

## 2023-01-27 RX ORDER — HEPARIN SODIUM 1000 [USP'U]/ML
INJECTION, SOLUTION INTRAVENOUS; SUBCUTANEOUS
Status: DISCONTINUED | OUTPATIENT
Start: 2023-01-27 | End: 2023-01-27 | Stop reason: HOSPADM

## 2023-01-27 RX ORDER — HYDRALAZINE HYDROCHLORIDE 20 MG/ML
INJECTION INTRAMUSCULAR; INTRAVENOUS
Status: DISCONTINUED | OUTPATIENT
Start: 2023-01-27 | End: 2023-01-27 | Stop reason: HOSPADM

## 2023-01-27 RX ORDER — LOSARTAN POTASSIUM 50 MG/1
100 TABLET ORAL DAILY
Status: DISCONTINUED | OUTPATIENT
Start: 2023-01-28 | End: 2023-01-28 | Stop reason: HOSPADM

## 2023-01-27 RX ORDER — AMLODIPINE BESYLATE 5 MG/1
10 TABLET ORAL DAILY
Status: DISCONTINUED | OUTPATIENT
Start: 2023-01-28 | End: 2023-01-28 | Stop reason: HOSPADM

## 2023-01-27 RX ORDER — NITROGLYCERIN 5 MG/ML
INJECTION, SOLUTION INTRAVENOUS
Status: DISCONTINUED | OUTPATIENT
Start: 2023-01-27 | End: 2023-01-27 | Stop reason: HOSPADM

## 2023-01-27 RX ORDER — SODIUM CHLORIDE 9 MG/ML
30 INJECTION, SOLUTION INTRAVENOUS CONTINUOUS
Status: DISCONTINUED | OUTPATIENT
Start: 2023-01-27 | End: 2023-01-27

## 2023-01-27 RX ORDER — ASPIRIN 81 MG/1
81 TABLET ORAL DAILY
Status: DISCONTINUED | OUTPATIENT
Start: 2023-01-27 | End: 2023-01-28 | Stop reason: HOSPADM

## 2023-01-27 RX ORDER — ISOSORBIDE MONONITRATE 30 MG/1
30 TABLET, EXTENDED RELEASE ORAL DAILY
Status: DISCONTINUED | OUTPATIENT
Start: 2023-01-28 | End: 2023-01-28 | Stop reason: HOSPADM

## 2023-01-27 RX ORDER — AMLODIPINE BESYLATE 5 MG/1
10 TABLET ORAL DAILY
Status: DISCONTINUED | OUTPATIENT
Start: 2023-01-27 | End: 2023-01-27

## 2023-01-27 RX ORDER — CARVEDILOL 6.25 MG/1
6.25 TABLET ORAL 2 TIMES DAILY
Status: DISCONTINUED | OUTPATIENT
Start: 2023-01-27 | End: 2023-01-28 | Stop reason: HOSPADM

## 2023-01-27 RX ORDER — FENTANYL CITRATE 50 UG/ML
INJECTION, SOLUTION INTRAMUSCULAR; INTRAVENOUS
Status: DISCONTINUED | OUTPATIENT
Start: 2023-01-27 | End: 2023-01-27 | Stop reason: HOSPADM

## 2023-01-27 RX ORDER — ATORVASTATIN CALCIUM 20 MG/1
20 TABLET, FILM COATED ORAL NIGHTLY
Status: DISCONTINUED | OUTPATIENT
Start: 2023-01-27 | End: 2023-01-28 | Stop reason: HOSPADM

## 2023-01-27 RX ORDER — ACETAMINOPHEN 325 MG/1
650 TABLET ORAL EVERY 4 HOURS PRN
Status: DISCONTINUED | OUTPATIENT
Start: 2023-01-27 | End: 2023-01-28 | Stop reason: HOSPADM

## 2023-01-27 RX ORDER — CLOPIDOGREL BISULFATE 75 MG/1
75 TABLET ORAL DAILY
Status: DISCONTINUED | OUTPATIENT
Start: 2023-01-28 | End: 2023-01-28 | Stop reason: HOSPADM

## 2023-01-27 RX ORDER — LIDOCAINE HYDROCHLORIDE 20 MG/ML
INJECTION, SOLUTION INFILTRATION; PERINEURAL
Status: DISCONTINUED | OUTPATIENT
Start: 2023-01-27 | End: 2023-01-27 | Stop reason: HOSPADM

## 2023-01-27 RX ORDER — SODIUM CHLORIDE 9 MG/ML
INJECTION, SOLUTION INTRAVENOUS
Status: DISCONTINUED | OUTPATIENT
Start: 2023-01-27 | End: 2023-01-27

## 2023-01-27 RX ADMIN — SODIUM CHLORIDE 30 ML/HR: 9 INJECTION, SOLUTION INTRAVENOUS at 11:54

## 2023-01-27 RX ADMIN — HYDRALAZINE HYDROCHLORIDE 25 MG: 25 TABLET, FILM COATED ORAL at 21:56

## 2023-01-27 RX ADMIN — HYDROMORPHONE HYDROCHLORIDE 1 MG: 1 INJECTION, SOLUTION INTRAMUSCULAR; INTRAVENOUS; SUBCUTANEOUS at 18:51

## 2023-01-27 RX ADMIN — SODIUM CHLORIDE 100 ML/HR: 9 INJECTION, SOLUTION INTRAVENOUS at 18:22

## 2023-01-27 RX ADMIN — ATORVASTATIN CALCIUM 20 MG: 20 TABLET, FILM COATED ORAL at 21:55

## 2023-01-27 RX ADMIN — ACETAMINOPHEN 650 MG: 325 TABLET, FILM COATED ORAL at 18:09

## 2023-01-27 RX ADMIN — CARVEDILOL 6.25 MG: 6.25 TABLET, FILM COATED ORAL at 21:55

## 2023-01-27 RX ADMIN — ASPIRIN 81 MG: 81 TABLET, COATED ORAL at 21:55

## 2023-01-28 VITALS
HEIGHT: 69 IN | OXYGEN SATURATION: 97 % | TEMPERATURE: 97.8 F | WEIGHT: 279.1 LBS | HEART RATE: 87 BPM | BODY MASS INDEX: 41.34 KG/M2 | RESPIRATION RATE: 18 BRPM | SYSTOLIC BLOOD PRESSURE: 136 MMHG | DIASTOLIC BLOOD PRESSURE: 62 MMHG

## 2023-01-28 LAB
ANION GAP SERPL CALCULATED.3IONS-SCNC: 10 MMOL/L (ref 5–15)
BUN SERPL-MCNC: 18 MG/DL (ref 8–23)
BUN/CREAT SERPL: 14.8 (ref 7–25)
CALCIUM SPEC-SCNC: 9.4 MG/DL (ref 8.6–10.5)
CHLORIDE SERPL-SCNC: 105 MMOL/L (ref 98–107)
CHOLEST SERPL-MCNC: 103 MG/DL (ref 0–200)
CO2 SERPL-SCNC: 25 MMOL/L (ref 22–29)
CREAT SERPL-MCNC: 1.22 MG/DL (ref 0.76–1.27)
DEPRECATED RDW RBC AUTO: 42.4 FL (ref 37–54)
EGFRCR SERPLBLD CKD-EPI 2021: 64.6 ML/MIN/1.73
ERYTHROCYTE [DISTWIDTH] IN BLOOD BY AUTOMATED COUNT: 13.3 % (ref 12.3–15.4)
GLUCOSE BLDC GLUCOMTR-MCNC: 115 MG/DL (ref 70–105)
GLUCOSE BLDC GLUCOMTR-MCNC: 116 MG/DL (ref 70–105)
GLUCOSE SERPL-MCNC: 151 MG/DL (ref 65–99)
HCT VFR BLD AUTO: 42.1 % (ref 37.5–51)
HDLC SERPL-MCNC: 36 MG/DL (ref 40–60)
HGB BLD-MCNC: 13.8 G/DL (ref 13–17.7)
LDLC SERPL CALC-MCNC: 44 MG/DL (ref 0–100)
LDLC/HDLC SERPL: 1.14 {RATIO}
MCH RBC QN AUTO: 30.2 PG (ref 26.6–33)
MCHC RBC AUTO-ENTMCNC: 32.8 G/DL (ref 31.5–35.7)
MCV RBC AUTO: 92.3 FL (ref 79–97)
PLATELET # BLD AUTO: 211 10*3/MM3 (ref 140–450)
PMV BLD AUTO: 9 FL (ref 6–12)
POTASSIUM SERPL-SCNC: 4 MMOL/L (ref 3.5–5.2)
RBC # BLD AUTO: 4.56 10*6/MM3 (ref 4.14–5.8)
SODIUM SERPL-SCNC: 140 MMOL/L (ref 136–145)
TRIGL SERPL-MCNC: 129 MG/DL (ref 0–150)
VLDLC SERPL-MCNC: 23 MG/DL (ref 5–40)
WBC NRBC COR # BLD: 8.8 10*3/MM3 (ref 3.4–10.8)

## 2023-01-28 PROCEDURE — 80048 BASIC METABOLIC PNL TOTAL CA: CPT | Performed by: INTERNAL MEDICINE

## 2023-01-28 PROCEDURE — 85027 COMPLETE CBC AUTOMATED: CPT | Performed by: INTERNAL MEDICINE

## 2023-01-28 PROCEDURE — 80061 LIPID PANEL: CPT | Performed by: INTERNAL MEDICINE

## 2023-01-28 PROCEDURE — G0378 HOSPITAL OBSERVATION PER HR: HCPCS

## 2023-01-28 PROCEDURE — 99239 HOSP IP/OBS DSCHRG MGMT >30: CPT | Performed by: INTERNAL MEDICINE

## 2023-01-28 PROCEDURE — 82962 GLUCOSE BLOOD TEST: CPT

## 2023-01-28 PROCEDURE — 83036 HEMOGLOBIN GLYCOSYLATED A1C: CPT | Performed by: INTERNAL MEDICINE

## 2023-01-28 RX ORDER — CLOPIDOGREL BISULFATE 75 MG/1
75 TABLET ORAL ONCE
Status: COMPLETED | OUTPATIENT
Start: 2023-01-28 | End: 2023-01-28

## 2023-01-28 RX ORDER — HYDRALAZINE HYDROCHLORIDE 25 MG/1
25 TABLET, FILM COATED ORAL ONCE
Status: COMPLETED | OUTPATIENT
Start: 2023-01-28 | End: 2023-01-28

## 2023-01-28 RX ORDER — HYDROXYZINE HYDROCHLORIDE 25 MG/1
25 TABLET, FILM COATED ORAL EVERY 6 HOURS PRN
Status: DISCONTINUED | OUTPATIENT
Start: 2023-01-28 | End: 2023-01-28 | Stop reason: HOSPADM

## 2023-01-28 RX ADMIN — LOSARTAN POTASSIUM 100 MG: 50 TABLET, FILM COATED ORAL at 08:33

## 2023-01-28 RX ADMIN — CARVEDILOL 6.25 MG: 6.25 TABLET, FILM COATED ORAL at 08:33

## 2023-01-28 RX ADMIN — ISOSORBIDE MONONITRATE 30 MG: 30 TABLET, EXTENDED RELEASE ORAL at 08:33

## 2023-01-28 RX ADMIN — CLOPIDOGREL BISULFATE 75 MG: 75 TABLET ORAL at 10:26

## 2023-01-28 RX ADMIN — HYDROXYZINE HYDROCHLORIDE 25 MG: 25 TABLET, FILM COATED ORAL at 01:38

## 2023-01-28 RX ADMIN — HYDRALAZINE HYDROCHLORIDE 25 MG: 25 TABLET, FILM COATED ORAL at 10:18

## 2023-01-28 RX ADMIN — ASPIRIN 81 MG: 81 TABLET, COATED ORAL at 08:34

## 2023-01-28 RX ADMIN — HYDRALAZINE HYDROCHLORIDE 25 MG: 25 TABLET, FILM COATED ORAL at 10:22

## 2023-01-28 RX ADMIN — AMLODIPINE BESYLATE 10 MG: 5 TABLET ORAL at 08:32

## 2023-01-28 RX ADMIN — SODIUM CHLORIDE 100 ML/HR: 9 INJECTION, SOLUTION INTRAVENOUS at 00:40

## 2023-01-28 RX ADMIN — CLOPIDOGREL BISULFATE 75 MG: 75 TABLET ORAL at 08:33

## 2023-01-30 LAB
HBA1C MFR BLD: 6 % (ref 3.5–5.6)
QT INTERVAL: 380 MS

## 2023-01-30 RX ORDER — HYDRALAZINE HYDROCHLORIDE 25 MG/1
TABLET, FILM COATED ORAL
Qty: 60 TABLET | Refills: 5 | Status: SHIPPED | OUTPATIENT
Start: 2023-01-30

## 2023-03-31 ENCOUNTER — OFFICE VISIT (OUTPATIENT)
Dept: CARDIOLOGY | Facility: CLINIC | Age: 68
End: 2023-03-31
Payer: COMMERCIAL

## 2023-03-31 VITALS
BODY MASS INDEX: 42.51 KG/M2 | RESPIRATION RATE: 18 BRPM | DIASTOLIC BLOOD PRESSURE: 65 MMHG | WEIGHT: 287 LBS | HEART RATE: 54 BPM | HEIGHT: 69 IN | SYSTOLIC BLOOD PRESSURE: 133 MMHG | OXYGEN SATURATION: 96 %

## 2023-03-31 DIAGNOSIS — I10 ESSENTIAL HYPERTENSION: ICD-10-CM

## 2023-03-31 DIAGNOSIS — I25.10 CAD, MULTIPLE VESSEL: Primary | ICD-10-CM

## 2023-03-31 PROCEDURE — 93000 ELECTROCARDIOGRAM COMPLETE: CPT | Performed by: NURSE PRACTITIONER

## 2023-03-31 PROCEDURE — 99214 OFFICE O/P EST MOD 30 MIN: CPT | Performed by: NURSE PRACTITIONER

## 2023-03-31 RX ORDER — NEBIVOLOL 10 MG/1
10 TABLET ORAL
COMMUNITY
Start: 2023-02-28 | End: 2023-03-31

## 2023-03-31 RX ORDER — ATORVASTATIN CALCIUM 20 MG/1
1 TABLET, FILM COATED ORAL
COMMUNITY
Start: 2023-02-14 | End: 2023-03-31 | Stop reason: SDUPTHER

## 2023-03-31 RX ORDER — HYDRALAZINE HYDROCHLORIDE 25 MG/1
25 TABLET, FILM COATED ORAL
COMMUNITY
Start: 2023-01-30 | End: 2023-03-31 | Stop reason: SDUPTHER

## 2023-03-31 RX ORDER — VARENICLINE TARTRATE 25 MG
KIT ORAL
COMMUNITY
Start: 2023-02-27

## 2023-04-13 NOTE — PROGRESS NOTES
Cardiology Office Follow Up Visit      Primary Care Provider:  Desiree Almaguer APRN    Reason for f/u:     2 month follow up, hospital f/u s/p C      Subjective     CC:    2 month follow up    History of Present Illness     Jamil Self is a 67 y.o. male who is a patient of Dr. Lawton. Pmh includes CAD s/p prior CABG ( LIMA to LAD, SVG to RCA and marginal branch), he underwent drug-eluting stent to the SVG graft to the RCA, EF preserved overall at 57% historically, HTN, HLD, DM, obesity, LIZETTE, tobacco use.     He presented in December with complaints of chest discomfort while coughing. He had been sick with a viral upper respiratory infection. He had occasional cough and noted discomfort when coughing. He reports when he underwent stenting in 3/2021 his pain was left sided chest pressure which he was not experiencing.  He was initiated on imdur and was to f/u in 1 mo.     Mr. Self presented in January with exertional chest discomfort. He was arranged for a stress test which was abnormal.   On 1/27/2023 he underwent cardiac cath which showed culprit distal LAD tandem 99% lesions distal to anastomosis, PCI overlapping stents 2.25 x 12 Xience overlapped distally with [place] 2.0 x 26 postdilated 2.5 mm reduction stenosis less than 10% residual, RYLIE-3 flow pre and post  Heavily diseased circumflex with heavy calcific disease, closed SVG to circumflex but still with RYLIE-3 flow to obtuse marginal branches, continue medical treatment as unchanged angiographically from prior films  Patent SVG to RCA with widely patent prior intervention proximal SVG graft with Xience drug-eluting stent  EF 50 to 55% with inferior wall motion abnormality which is chronic and old and unchanged, normal LV pressures LVEDP and transaortic valve gradient    Mr. Self presents today doing well. We reviewed his medications. He is taking his ASA / Plavix. He has 2 beta blockers on his medication list, he is unsure which  He is taking.            ASSESSMENT/PLAN:    1. Follow-up exam (Primary)     2. Coronary artery disease involving native coronary artery of native heart without angina pectoris  - most recent LHC showing culprit distal LAD tandem 99% lesions distal to anastomosis, PCI overlapping stents 2.25 x 12 Xience overlapped distally with [place] 2.0 x 26 postdilated 2.5 mm reduction stenosis less than 10% residual, RYLIE-3 flow pre and post  Heavily diseased circumflex with heavy calcific disease, closed SVG to circumflex but still with RYLIE-3 flow to obtuse marginal branches, continue medical treatment as unchanged angiographically from prior films  Patent SVG to RCA with widely patent prior intervention proximal SVG graft with Xience drug-eluting stent       3. Hx of CABG     4. Essential hypertension     5. Mixed hyperlipidemia     6. Stage 3 chronic kidney disease, unspecified whether stage 3a or 3b CKD (HCC)    Current outpatient and discharge medications have been reconciled for the patient.  Reviewed by: ARIK Romano    MEDICAL DECISION MAKING:  Mr. Self presents for his 2 month follow up and hospital discharge follow up after PCI as described above. He is free from angina and has no exertional symptoms. We discussed importance of medication compliance and continuing DAPT with ASA / Plavix. He is bradycardic but questionably taking 2 beta blockers. He should stop bystolic and continue coreg 6.25mg BID. Continue statin. Blood pressure controlled. Continue keeping BID blood pressure log. Continue statin therapy and nitrates. We will see him back in 6 mo or sooner should new issues arise.         Past Medical History:   Diagnosis Date   • Coronary artery disease    • Diabetes mellitus    • Hyperlipidemia    • Hypertension    • LIZETTE (obstructive sleep apnea)        Past Surgical History:   Procedure Laterality Date   • CARDIAC CATHETERIZATION N/A 03/21/2021    Procedure: Left Heart Cath;  Surgeon: Jourdan Pillai MD;   Location: [facility] CATH INVASIVE LOCATION;  Service: Cardiology;  Laterality: N/A;   • CARDIAC CATHETERIZATION N/A 1/27/2023    Procedure: Left Heart Cath;  Surgeon: Dev Lawton MD;  Location: [facility] CATH INVASIVE LOCATION;  Service: Cardiology;  Laterality: N/A;   • CAROTID STENT     • CORONARY ARTERY BYPASS GRAFT      1990's, by patient's guess   • CORONARY STENT PLACEMENT           Current Outpatient Medications:   •  amLODIPine (NORVASC) 10 MG tablet, Take 1 tablet by mouth Daily., Disp: , Rfl:   •  aspirin 81 MG EC tablet, Take 1 tablet by mouth Daily., Disp: 30 tablet, Rfl: 0  •  atorvastatin (LIPITOR) 20 MG tablet, Take 1 tablet by mouth Daily., Disp: , Rfl:   •  carvedilol (COREG) 6.25 MG tablet, TAKE 1 TABLET BY MOUTH TWICE A DAY, Disp: 60 tablet, Rfl: 5  •  clopidogrel (PLAVIX) 75 MG tablet, Take 1 tablet by mouth Daily., Disp: 90 tablet, Rfl: 1  •  hydrALAZINE (APRESOLINE) 25 MG tablet, TAKE 1 TABLET BY MOUTH 2 (TWO) TIMES A DAY, Disp: 60 tablet, Rfl: 5  •  isosorbide mononitrate (IMDUR) 30 MG 24 hr tablet, Take 1 tablet by mouth Daily., Disp: 90 tablet, Rfl: 3  •  losartan (Cozaar) 100 MG tablet, Take 1 tablet by mouth Daily., Disp: 90 tablet, Rfl: 3  •  metFORMIN (GLUCOPHAGE) 1000 MG tablet, Take 500 mg by mouth 2 (Two) Times a Day With Meals., Disp: , Rfl:   •  Varenicline Tartrate 0.5 MG X 11 & 1 MG X 42 tablet, TAKE 1 TABLET BY MOUTH AS DIRECTED PER PACKAGE INSTRUCTIONS., Disp: , Rfl:     Social History     Socioeconomic History   • Marital status:    Tobacco Use   • Smoking status: Every Day     Packs/day: 1.00     Years: 40.00     Pack years: 40.00     Types: Cigarettes   • Smokeless tobacco: Never   Vaping Use   • Vaping Use: Never used   Substance and Sexual Activity   • Alcohol use: Never   • Drug use: Never   • Sexual activity: Defer       Family History   Problem Relation Age of Onset   • No Known Problems Mother    • No Known Problems Father        The following portions of  "the patient's history were reviewed and updated as appropriate: allergies, current medications, past family history, past medical history, past social history, past surgical history and problem list.    Review of Systems   Constitutional: Negative for chills, diaphoresis and malaise/fatigue.   Cardiovascular: Negative for chest pain, dyspnea on exertion, irregular heartbeat, leg swelling, near-syncope, orthopnea, palpitations, paroxysmal nocturnal dyspnea and syncope.   Respiratory: Negative for cough, shortness of breath, sleep disturbances due to breathing and sputum production.    Gastrointestinal: Negative for change in bowel habit.   Genitourinary: Negative for urgency.   Neurological: Negative for dizziness and headaches.   Psychiatric/Behavioral: Negative for altered mental status.       Pertinent items are noted in HPI, all other systems reviewed and negative    /65 (BP Location: Left arm, Patient Position: Sitting, Cuff Size: Large Adult)   Pulse 54   Resp 18   Ht 175.3 cm (69\")   Wt 130 kg (287 lb)   SpO2 96%   BMI 42.38 kg/m² .  Objective     Constitutional:       Appearance: Not in distress.   Neck:      Vascular: JVD normal.   Pulmonary:      Effort: Pulmonary effort is normal.      Breath sounds: Normal breath sounds.   Cardiovascular:      Normal rate. Regular rhythm.      Murmurs: There is a systolic murmur.   Pulses:     Intact distal pulses.   Edema:     Peripheral edema absent.   Abdominal:      General: Bowel sounds are normal.      Palpations: Abdomen is soft.   Musculoskeletal: Normal range of motion. Skin:     General: Skin is warm and dry.   Neurological:      General: No focal deficit present.      Mental Status: Oriented to person, place and time.             ECG 12 Lead    Date/Time: 3/31/2023 10:18 AM  Performed by: Herminia Olmos APRN  Authorized by: Herminia Olmos APRN   Comparison: not compared with previous ECG   Previous ECG: no previous ECG available  Rhythm: sinus " bradycardia  Rate: bradycardic  BPM: 54  Q waves: V3 and V4                EKG ordered by and reviewed by me in office

## 2023-09-13 DIAGNOSIS — I10 ESSENTIAL HYPERTENSION: ICD-10-CM

## 2023-09-14 RX ORDER — LOSARTAN POTASSIUM 100 MG/1
TABLET ORAL
Qty: 90 TABLET | Refills: 3 | Status: SHIPPED | OUTPATIENT
Start: 2023-09-14

## 2023-10-05 RX ORDER — HYDRALAZINE HYDROCHLORIDE 25 MG/1
TABLET, FILM COATED ORAL
Qty: 180 TABLET | Refills: 1 | Status: SHIPPED | OUTPATIENT
Start: 2023-10-05

## 2023-10-05 RX ORDER — ISOSORBIDE MONONITRATE 30 MG/1
TABLET, EXTENDED RELEASE ORAL
Qty: 90 TABLET | Refills: 3 | Status: SHIPPED | OUTPATIENT
Start: 2023-10-05

## 2023-10-11 ENCOUNTER — HOSPITAL ENCOUNTER (EMERGENCY)
Facility: HOSPITAL | Age: 68
Discharge: HOME | End: 2023-10-12
Attending: EMERGENCY MEDICINE
Payer: COMMERCIAL

## 2023-10-11 DIAGNOSIS — R07.9 CHEST PAIN, UNSPECIFIED TYPE: Primary | ICD-10-CM

## 2023-10-11 PROCEDURE — 93005 ELECTROCARDIOGRAM TRACING: CPT

## 2023-10-11 PROCEDURE — 93005 ELECTROCARDIOGRAM TRACING: CPT | Performed by: EMERGENCY MEDICINE

## 2023-10-11 PROCEDURE — 99284 EMERGENCY DEPT VISIT MOD MDM: CPT

## 2023-10-12 ENCOUNTER — APPOINTMENT (OUTPATIENT)
Dept: GENERAL RADIOLOGY | Facility: HOSPITAL | Age: 68
End: 2023-10-12
Payer: COMMERCIAL

## 2023-10-12 VITALS
SYSTOLIC BLOOD PRESSURE: 118 MMHG | WEIGHT: 303.79 LBS | HEART RATE: 69 BPM | BODY MASS INDEX: 41.15 KG/M2 | TEMPERATURE: 97.7 F | HEIGHT: 72 IN | RESPIRATION RATE: 19 BRPM | DIASTOLIC BLOOD PRESSURE: 75 MMHG | OXYGEN SATURATION: 92 %

## 2023-10-12 LAB
ALBUMIN SERPL-MCNC: 4.7 G/DL (ref 3.5–5.2)
ALBUMIN/GLOB SERPL: 1.5 G/DL
ALP SERPL-CCNC: 95 U/L (ref 39–117)
ALT SERPL W P-5'-P-CCNC: 14 U/L (ref 1–41)
ANION GAP SERPL CALCULATED.3IONS-SCNC: 10 MMOL/L (ref 5–15)
APTT PPP: 25.9 SECONDS (ref 24–31)
AST SERPL-CCNC: 14 U/L (ref 1–40)
BACTERIA UR QL AUTO: ABNORMAL /HPF
BASOPHILS # BLD AUTO: 0.1 10*3/MM3 (ref 0–0.2)
BASOPHILS NFR BLD AUTO: 1.4 % (ref 0–1.5)
BILIRUB SERPL-MCNC: 0.3 MG/DL (ref 0–1.2)
BILIRUB UR QL STRIP: NEGATIVE
BUN SERPL-MCNC: 26 MG/DL (ref 8–23)
BUN/CREAT SERPL: 16.8 (ref 7–25)
CALCIUM SPEC-SCNC: 9.8 MG/DL (ref 8.6–10.5)
CHLORIDE SERPL-SCNC: 103 MMOL/L (ref 98–107)
CLARITY UR: CLEAR
CO2 SERPL-SCNC: 27 MMOL/L (ref 22–29)
COLOR UR: YELLOW
CREAT SERPL-MCNC: 1.55 MG/DL (ref 0.76–1.27)
DEPRECATED RDW RBC AUTO: 46.4 FL (ref 37–54)
EGFRCR SERPLBLD CKD-EPI 2021: 48.5 ML/MIN/1.73
EOSINOPHIL # BLD AUTO: 0.3 10*3/MM3 (ref 0–0.4)
EOSINOPHIL NFR BLD AUTO: 3.3 % (ref 0.3–6.2)
ERYTHROCYTE [DISTWIDTH] IN BLOOD BY AUTOMATED COUNT: 13.7 % (ref 12.3–15.4)
GEN 5 2HR TROPONIN T REFLEX: 13 NG/L
GLOBULIN UR ELPH-MCNC: 3.2 GM/DL
GLUCOSE SERPL-MCNC: 105 MG/DL (ref 65–99)
GLUCOSE UR STRIP-MCNC: NEGATIVE MG/DL
HCT VFR BLD AUTO: 43.4 % (ref 37.5–51)
HGB BLD-MCNC: 14.7 G/DL (ref 13–17.7)
HGB UR QL STRIP.AUTO: NEGATIVE
HYALINE CASTS UR QL AUTO: ABNORMAL /LPF
INR PPP: <0.93 (ref 0.93–1.1)
KETONES UR QL STRIP: NEGATIVE
LEUKOCYTE ESTERASE UR QL STRIP.AUTO: NEGATIVE
LYMPHOCYTES # BLD AUTO: 1.4 10*3/MM3 (ref 0.7–3.1)
LYMPHOCYTES NFR BLD AUTO: 15.6 % (ref 19.6–45.3)
MAGNESIUM SERPL-MCNC: 2.3 MG/DL (ref 1.6–2.4)
MCH RBC QN AUTO: 31 PG (ref 26.6–33)
MCHC RBC AUTO-ENTMCNC: 33.8 G/DL (ref 31.5–35.7)
MCV RBC AUTO: 91.5 FL (ref 79–97)
MONOCYTES # BLD AUTO: 0.7 10*3/MM3 (ref 0.1–0.9)
MONOCYTES NFR BLD AUTO: 7.5 % (ref 5–12)
NEUTROPHILS NFR BLD AUTO: 6.3 10*3/MM3 (ref 1.7–7)
NEUTROPHILS NFR BLD AUTO: 72.2 % (ref 42.7–76)
NITRITE UR QL STRIP: NEGATIVE
NRBC BLD AUTO-RTO: 0.1 /100 WBC (ref 0–0.2)
NT-PROBNP SERPL-MCNC: 107.2 PG/ML (ref 0–900)
PH UR STRIP.AUTO: 6 [PH] (ref 5–8)
PLATELET # BLD AUTO: 228 10*3/MM3 (ref 140–450)
PMV BLD AUTO: 8.7 FL (ref 6–12)
POTASSIUM SERPL-SCNC: 4.6 MMOL/L (ref 3.5–5.2)
PROT SERPL-MCNC: 7.9 G/DL (ref 6–8.5)
PROT UR QL STRIP: ABNORMAL
PROTHROMBIN TIME: 9.8 SECONDS (ref 9.6–11.7)
QT INTERVAL: 364 MS
QTC INTERVAL: 424 MS
RBC # BLD AUTO: 4.74 10*6/MM3 (ref 4.14–5.8)
RBC # UR STRIP: ABNORMAL /HPF
REF LAB TEST METHOD: ABNORMAL
SODIUM SERPL-SCNC: 140 MMOL/L (ref 136–145)
SP GR UR STRIP: 1.02 (ref 1–1.03)
SQUAMOUS #/AREA URNS HPF: ABNORMAL /HPF
TROPONIN T DELTA: 0 NG/L
TROPONIN T SERPL HS-MCNC: 13 NG/L
UROBILINOGEN UR QL STRIP: ABNORMAL
WBC # UR STRIP: ABNORMAL /HPF
WBC NRBC COR # BLD: 8.7 10*3/MM3 (ref 3.4–10.8)

## 2023-10-12 PROCEDURE — 85610 PROTHROMBIN TIME: CPT

## 2023-10-12 PROCEDURE — 83735 ASSAY OF MAGNESIUM: CPT

## 2023-10-12 PROCEDURE — 85730 THROMBOPLASTIN TIME PARTIAL: CPT

## 2023-10-12 PROCEDURE — 84484 ASSAY OF TROPONIN QUANT: CPT

## 2023-10-12 PROCEDURE — 36415 COLL VENOUS BLD VENIPUNCTURE: CPT

## 2023-10-12 PROCEDURE — 80053 COMPREHEN METABOLIC PANEL: CPT

## 2023-10-12 PROCEDURE — 81001 URINALYSIS AUTO W/SCOPE: CPT

## 2023-10-12 PROCEDURE — 83880 ASSAY OF NATRIURETIC PEPTIDE: CPT

## 2023-10-12 PROCEDURE — 71045 X-RAY EXAM CHEST 1 VIEW: CPT

## 2023-10-12 PROCEDURE — 85025 COMPLETE CBC W/AUTO DIFF WBC: CPT

## 2023-10-12 RX ORDER — SODIUM CHLORIDE 0.9 % (FLUSH) 0.9 %
10 SYRINGE (ML) INJECTION AS NEEDED
Status: DISCONTINUED | OUTPATIENT
Start: 2023-10-12 | End: 2023-10-12 | Stop reason: HOSPADM

## 2023-10-12 RX ADMIN — NITROGLYCERIN 1 INCH: 20 OINTMENT TOPICAL at 00:36

## 2023-10-12 NOTE — ED PROVIDER NOTES
Subjective   History of Present Illness  Patient is a pleasant 67-year-old obese  male with a history of triple bypass, hypertension and diabetes who presents the emergency room with complaints of chest pain that occurred earlier today.  Patient states that he took a nitroglycerin tablet and it helped for about 30 minutes before the chest pain returned.  He took a second tablet and it stopped the pain but patient wanted to be checked out given his cardiac history.  Currently, patient denies cardiac pain.  He states that the pain was in his left anterior chest without radiation.  He denies shortness of breath, fever and dizziness.  He reports taking Plavix and aspirin daily, with other medications as well.  He smokes about a pack of cigarettes daily.  He has no known drug allergies.  He denies use of drugs and alcohol.    PCP: Tripp  Cardio: Jered      Review of Systems   Constitutional:  Negative for appetite change and fever.   HENT:  Negative for congestion.    Respiratory:  Negative for cough, chest tightness and shortness of breath.    Cardiovascular:  Positive for chest pain. Negative for palpitations and leg swelling.   Gastrointestinal:  Negative for abdominal pain and nausea.   Genitourinary:  Negative for dysuria.   Musculoskeletal:  Negative for myalgias.   Neurological:  Negative for dizziness and headaches.   Psychiatric/Behavioral:  The patient is not nervous/anxious.    All other systems reviewed and are negative.      Past Medical History:   Diagnosis Date    Coronary artery disease     Diabetes mellitus     Hyperlipidemia     Hypertension     LIEZTTE (obstructive sleep apnea)        No Known Allergies    Past Surgical History:   Procedure Laterality Date    CARDIAC CATHETERIZATION N/A 03/21/2021    Procedure: Left Heart Cath;  Surgeon: Jourdan Pillai MD;  Location: [facility] CATH INVASIVE LOCATION;  Service: Cardiology;  Laterality: N/A;    CARDIAC CATHETERIZATION N/A 1/27/2023     Procedure: Left Heart Cath;  Surgeon: Dev Lawton MD;  Location: [facility] CATH INVASIVE LOCATION;  Service: Cardiology;  Laterality: N/A;    CAROTID STENT      CORONARY ARTERY BYPASS GRAFT      1990's, by patient's guess    CORONARY STENT PLACEMENT         Family History   Problem Relation Age of Onset    No Known Problems Mother     No Known Problems Father        Social History     Socioeconomic History    Marital status:    Tobacco Use    Smoking status: Every Day     Packs/day: 1.00     Years: 40.00     Additional pack years: 0.00     Total pack years: 40.00     Types: Cigarettes    Smokeless tobacco: Never   Vaping Use    Vaping Use: Never used   Substance and Sexual Activity    Alcohol use: Never    Drug use: Never    Sexual activity: Defer           Objective   Physical Exam  Vitals and nursing note reviewed.   Constitutional:       General: He is awake. He is not in acute distress.     Appearance: Normal appearance. He is well-developed. He is not ill-appearing.   HENT:      Head: Normocephalic and atraumatic. No raccoon eyes or Bae's sign.      Right Ear: Hearing, tympanic membrane and ear canal normal.      Left Ear: Hearing, tympanic membrane and ear canal normal.   Eyes:      General: Vision grossly intact. Gaze aligned appropriately.      Extraocular Movements: Extraocular movements intact.      Pupils: Pupils are equal, round, and reactive to light.   Cardiovascular:      Rate and Rhythm: Normal rate and regular rhythm.      Pulses: Normal pulses.      Heart sounds: Normal heart sounds. No murmur heard.  Pulmonary:      Effort: Pulmonary effort is normal. No respiratory distress.      Breath sounds: Normal breath sounds.   Abdominal:      General: Bowel sounds are normal.      Palpations: Abdomen is soft.      Tenderness: There is no abdominal tenderness.   Musculoskeletal:         General: Normal range of motion.      Cervical back: Normal range of motion and neck supple.  "  Skin:     General: Skin is warm and dry.      Capillary Refill: Capillary refill takes less than 2 seconds.   Neurological:      General: No focal deficit present.      Mental Status: He is alert and oriented to person, place, and time. Mental status is at baseline.      GCS: GCS eye subscore is 4. GCS verbal subscore is 5. GCS motor subscore is 6.      Cranial Nerves: Cranial nerves 2-12 are intact.      Sensory: Sensation is intact.      Motor: Motor function is intact.      Deep Tendon Reflexes: Reflexes are normal and symmetric.   Psychiatric:         Mood and Affect: Mood normal.         Behavior: Behavior normal.         Procedures           ED Course      /75   Pulse 69   Temp 97.7 øF (36.5 øC)   Resp 19   Ht 182.9 cm (72\")   Wt (!) 138 kg (303 lb 12.7 oz)   SpO2 92%   BMI 41.20 kg/mý   Labs Reviewed   COMPREHENSIVE METABOLIC PANEL - Abnormal; Notable for the following components:       Result Value    Glucose 105 (*)     BUN 26 (*)     Creatinine 1.55 (*)     eGFR 48.5 (*)     All other components within normal limits    Narrative:     GFR Normal >60  Chronic Kidney Disease <60  Kidney Failure <15     URINALYSIS W/ MICROSCOPIC IF INDICATED (NO CULTURE) - Abnormal; Notable for the following components:    Protein, UA 30 mg/dL (1+) (*)     All other components within normal limits   PROTIME-INR - Abnormal; Notable for the following components:    INR <0.93 (*)     All other components within normal limits   CBC WITH AUTO DIFFERENTIAL - Abnormal; Notable for the following components:    Lymphocyte % 15.6 (*)     All other components within normal limits   URINALYSIS, MICROSCOPIC ONLY - Abnormal; Notable for the following components:    RBC, UA 0-2 (*)     WBC, UA 0-2 (*)     All other components within normal limits   APTT - Normal   TROPONIN - Normal    Narrative:     High Sensitive Troponin T Reference Range:  <10.0 ng/L- Negative Female for AMI  <15.0 ng/L- Negative Male for AMI  >=10 - " Abnormal Female indicating possible myocardial injury.  >=15 - Abnormal Male indicating possible myocardial injury.   Clinicians would have to utilize clinical acumen, EKG, Troponin, and serial changes to determine if it is an Acute Myocardial Infarction or myocardial injury due to an underlying chronic condition.        BNP (IN-HOUSE) - Normal    Narrative:     This assay is used as an aid in the diagnosis of individuals suspected of having heart failure. It can be used as an aid in the diagnosis of acute decompensated heart failure (ADHF) in patients presenting with signs and symptoms of ADHF to the emergency department (ED). In addition, NT-proBNP of <300 pg/mL indicates ADHF is not likely.    Age Range Result Interpretation  NT-proBNP Concentration (pg/mL:      <50             Positive            >450                   Gray                 300-450                    Negative             <300    50-75           Positive            >900                  Gray                300-900                  Negative            <300      >75             Positive            >1800                  Gray                300-1800                  Negative            <300   MAGNESIUM - Normal   HIGH SENSITIVITIY TROPONIN T 2HR - Normal    Narrative:     High Sensitive Troponin T Reference Range:  <10.0 ng/L- Negative Female for AMI  <15.0 ng/L- Negative Male for AMI  >=10 - Abnormal Female indicating possible myocardial injury.  >=15 - Abnormal Male indicating possible myocardial injury.   Clinicians would have to utilize clinical acumen, EKG, Troponin, and serial changes to determine if it is an Acute Myocardial Infarction or myocardial injury due to an underlying chronic condition.        CBC AND DIFFERENTIAL    Narrative:     The following orders were created for panel order CBC & Differential.  Procedure                               Abnormality         Status                     ---------                                -----------         ------                     CBC Auto Differential[[id number]]        Abnormal            Final result                 Please view results for these tests on the individual orders.     Medications   sodium chloride 0.9 % flush 10 mL (has no administration in time range)   nitroglycerin (NITROSTAT) ointment 1 inch (1 inch Topical Given 10/12/23 0036)     XR Chest 1 View    Result Date: 10/12/2023  Impression: No acute cardiopulmonary abnormality. Electronically Signed: Jr Subramanian MD  10/12/2023 12:47 AM EDT  Workstation ID: [id number]                   HEART Score: 4                      Medical Decision Making  Problems Addressed:  Chest pain, unspecified type: complicated acute illness or injury    Amount and/or Complexity of Data Reviewed  Labs: ordered.  Radiology: ordered.  ECG/medicine tests: ordered.    Risk  Prescription drug management.    Patient is a pleasant 67-year-old obese  male who presents the emergency room with complaints of left anterior chest pain that started earlier today but was resolved with nitroglycerin.  Patient's exam is unremarkable with normal S1/S2.  No clicks or murmurs.  No JVD or leg swelling.  Lungs clear to auscultation in all fields.  Abdomen found to be soft and nontender with normal bowel sounds throughout.  Pupils PERRLA.  GCS 15.  Initial differentials include ACS, angina pectoris, pneumonia, fluid overload.  This is not a complete list.    IV was established labs were obtained.  Patient received above examination.  Nitropaste was applied for symptom control.  My interpretation of chest x-ray reveals no infiltrates, nodules or pneumothorax.  Patient's troponin is normal x2.  Urinalysis negative for acute UTI.  Patient's kidney function is mildly elevated but his blood work is otherwise unremarkable.  Upon reassessment, patient continues to deny chest pain and has not had any episodes while in the emergency room.  Results were discussed with the  patient, who is anxious for discharge.  He was offered observation admission for further evaluation and cardiac work-up but has declined at this time and states that he already has an appointment set with his cardiologist.  He does plan to follow-up and understands the importance.  At this time, patient will be discharged with instruction to follow-up with his primary care provider as needed.  He verbalized understanding and is agreeable to plan of care.  He has remained hemodynamically stable and is in no acute distress.    I discussed the findings with patient who voices understanding of discharge instructions, signs and symptoms requiring return to the ED; discharged improved and stable condition with follow-up for reevaluation.    Patient is aware that discharge does not mean that nothing is wrong but it indicates no emergency is present and they must continue care with follow-up as given below or physician of their choice.    This document is intended for medical expert use only.  Reading of this document by patients and/or patient's family without participating medical staff guidance may result in misinterpretation and unintended morbidity.  Any interpretation of such data is the responsibility of the patient and/or family member responsible for the patient in concert with their primary or specialist providers, not to be left for sources of online search as such as [employer], avox or similar queries.  Relying on these approaches to knowledge may result in misinterpretation, misguided goals of care and even death should patient or family members try recommendations outside of the realm of professional medical care in a supervised inpatient environment.    This medical document was created using Dragon dictation system. Some errors in speech recognition may occur.    Final diagnoses:   Chest pain, unspecified type       ED Disposition  ED Disposition       ED Disposition   Discharge    Condition   Stable     Comment   --               Desiree Almaguer, APRN  [address] [place] [address] [phone]          Dev Lawton MD  [address] [place] [address] [place] [address] [phone]               Medication List      No changes were made to your prescriptions during this visit.            Lise Lawson, APRN  10/12/23 0303

## 2023-10-12 NOTE — DISCHARGE INSTRUCTIONS
Continue taking previously prescribed medications as directed.  Rest.    Follow-up with your cardiologist for further evaluation and management.  Follow-up with primary care provider as needed.    Return to the ER for new or worsening symptoms.

## 2023-10-16 RX ORDER — HYDROCODONE BITARTRATE AND ACETAMINOPHEN 5; 325 MG/1; MG/1
TABLET ORAL
COMMUNITY
Start: 2023-08-08 | End: 2023-10-20

## 2023-10-16 RX ORDER — NEBIVOLOL 10 MG/1
10 TABLET ORAL DAILY
COMMUNITY
Start: 2023-09-21 | End: 2023-10-20

## 2023-10-20 ENCOUNTER — OFFICE VISIT (OUTPATIENT)
Dept: CARDIOLOGY | Facility: CLINIC | Age: 68
End: 2023-10-20
Payer: COMMERCIAL

## 2023-10-20 VITALS
BODY MASS INDEX: 40.09 KG/M2 | RESPIRATION RATE: 18 BRPM | SYSTOLIC BLOOD PRESSURE: 154 MMHG | HEART RATE: 79 BPM | DIASTOLIC BLOOD PRESSURE: 74 MMHG | HEIGHT: 72 IN | OXYGEN SATURATION: 97 % | WEIGHT: 296 LBS

## 2023-10-20 DIAGNOSIS — Z95.1 HX OF CABG: ICD-10-CM

## 2023-10-20 DIAGNOSIS — I10 ESSENTIAL HYPERTENSION: Chronic | ICD-10-CM

## 2023-10-20 DIAGNOSIS — N18.2 CKD (CHRONIC KIDNEY DISEASE) STAGE 2, GFR 60-89 ML/MIN: ICD-10-CM

## 2023-10-20 DIAGNOSIS — I20.0 UNSTABLE ANGINA: Primary | ICD-10-CM

## 2023-10-20 DIAGNOSIS — E78.2 MIXED HYPERLIPIDEMIA: ICD-10-CM

## 2023-10-20 RX ORDER — FUROSEMIDE 20 MG/1
20 TABLET ORAL AS NEEDED
Qty: 90 TABLET | Refills: 3 | Status: SHIPPED | OUTPATIENT
Start: 2023-10-20

## 2023-10-20 RX ORDER — ISOSORBIDE MONONITRATE 30 MG/1
60 TABLET, EXTENDED RELEASE ORAL DAILY
Qty: 90 TABLET | Refills: 3 | Status: SHIPPED | OUTPATIENT
Start: 2023-10-20

## 2023-10-20 NOTE — PROGRESS NOTES
Cardiology Office Follow Up Visit      Primary Care Provider:  Desiree Almaguer APRN    Reason for f/u:     Chest pain      Subjective     CC:    Chest pain    History of Present Illness       Jamil Self is a 67 y.o. male who is a patient of Dr. Lawton. Pmh includes CAD s/p prior CABG ( LIMA to LAD, SVG to RCA and marginal branch), he underwent drug-eluting stent to the SVG graft to the RCA, EF preserved overall at 57% historically, HTN, HLD, DM, obesity, LIZETTE, tobacco use.     He presented in December with complaints of chest discomfort while coughing. He had been sick with a viral upper respiratory infection. He had occasional cough and noted discomfort when coughing. He reports when he underwent stenting in 3/2021 his pain was left sided chest pressure which he was not experiencing.  He was initiated on imdur and was to f/u in 1 mo.     Mr. Self presented in January with exertional chest discomfort. He was arranged for a stress test which was abnormal.   On 1/27/2023 he underwent cardiac cath which showed culprit distal LAD tandem 99% lesions distal to anastomosis, PCI overlapping stents 2.25 x 12 Xience overlapped distally with [place] 2.0 x 26 postdilated 2.5 mm reduction stenosis less than 10% residual, RYLIE-3 flow pre and post  Heavily diseased circumflex with heavy calcific disease, closed SVG to circumflex but still with RYLIE-3 flow to obtuse marginal branches, continue medical treatment as unchanged angiographically from prior films  Patent SVG to RCA with widely patent prior intervention proximal SVG graft with Xience drug-eluting stent  EF 50 to 55% with inferior wall motion abnormality which is chronic and old and unchanged, normal LV pressures LVEDP and transaortic valve gradient.    Mr. Self presents today with complaints of chest pain, exertional described as left sided pressure, stabbing pain. He has been taking SL nitroglycerine. He states pain is worse with exertion. He went to the ER and  was ruled out with negative troponin therefore sent home and instructed to follow up with cardiology. He states pain has intensified over the last week.           ASSESSMENT/PLAN:      Diagnoses and all orders for this visit:    1. Unstable angina (Primary)    2. Essential hypertension    3. Mixed hyperlipidemia    4. Hx of CABG    5. CKD (chronic kidney disease) stage 2, GFR 60-89 ml/min  -     Ambulatory Referral to Nephrology    Other orders  -     isosorbide mononitrate (IMDUR) 30 MG 24 hr tablet; Take 2 tablets by mouth Daily.  Dispense: 90 tablet; Refill: 3  -     furosemide (LASIX) 20 MG tablet; Take 1 tablet by mouth As Needed (lower extremity swelling).  Dispense: 90 tablet; Refill: 3        MEDICAL DECISION MAKING:  Mr. Self presents with unstable angina. He has been advised to go to the ER but he is refusing to go to the ER. I am increasing his Imdur to 60mg daily. I am adding as needed lasix as he has some lower extremity edema. He has known CKD, I will refer him to nephrology. He has been advised to go to the ER for any worsening symptoms. I will arrange [place] for next week with Dr. Lawton. Additional antianginals include amlodipine 10mg daily, coreg 6.25mg BID, ASA / Plavix / statin therapy.           Past Medical History:   Diagnosis Date    Coronary artery disease     Diabetes mellitus     Hyperlipidemia     Hypertension     LIZETTE (obstructive sleep apnea)        Past Surgical History:   Procedure Laterality Date    CARDIAC CATHETERIZATION N/A 03/21/2021    Procedure: Left Heart Cath;  Surgeon: Jourdan Pillai MD;  Location:  [facility] CATH INVASIVE LOCATION;  Service: Cardiology;  Laterality: N/A;    CARDIAC CATHETERIZATION N/A 1/27/2023    Procedure: Left Heart Cath;  Surgeon: Dev Lawton MD;  Location: [facility] CATH INVASIVE LOCATION;  Service: Cardiology;  Laterality: N/A;    CAROTID STENT      CORONARY ARTERY BYPASS GRAFT      1990's, by patient's guess    CORONARY STENT  PLACEMENT           Current Outpatient Medications:     amLODIPine (NORVASC) 10 MG tablet, Take 1 tablet by mouth Daily., Disp: , Rfl:     aspirin 81 MG EC tablet, Take 1 tablet by mouth Daily., Disp: 30 tablet, Rfl: 0    atorvastatin (LIPITOR) 20 MG tablet, Take 1 tablet by mouth Daily., Disp: , Rfl:     carvedilol (COREG) 6.25 MG tablet, Take 1 tablet by mouth 2 (Two) Times a Day., Disp: 60 tablet, Rfl: 5    clopidogrel (PLAVIX) 75 MG tablet, Take 1 tablet by mouth Daily., Disp: 90 tablet, Rfl: 1    hydrALAZINE (APRESOLINE) 25 MG tablet, TAKE 1 TABLET BY MOUTH TWICE A DAY, Disp: 180 tablet, Rfl: 1    isosorbide mononitrate (IMDUR) 30 MG 24 hr tablet, Take 2 tablets by mouth Daily., Disp: 90 tablet, Rfl: 3    losartan (COZAAR) 100 MG tablet, TAKE 1 TABLET BY MOUTH EVERY DAY, Disp: 90 tablet, Rfl: 3    metFORMIN (GLUCOPHAGE) 1000 MG tablet, Take 0.5 tablets by mouth 2 (Two) Times a Day With Meals., Disp: , Rfl:     nebivolol (BYSTOLIC) 10 MG tablet, Take 1 tablet by mouth Daily., Disp: , Rfl:     furosemide (LASIX) 20 MG tablet, Take 1 tablet by mouth As Needed (lower extremity swelling)., Disp: 90 tablet, Rfl: 3    Social History     Socioeconomic History    Marital status:    Tobacco Use    Smoking status: Former     Packs/day: 1.00     Years: 40.00     Additional pack years: 0.00     Total pack years: 40.00     Types: Cigarettes     Quit date: 10/20/2023    Smokeless tobacco: Never   Vaping Use    Vaping Use: Never used   Substance and Sexual Activity    Alcohol use: Never    Drug use: Never    Sexual activity: Defer       Family History   Problem Relation Age of Onset    No Known Problems Mother     No Known Problems Father        The following portions of the patient's history were reviewed and updated as appropriate: allergies, current medications, past family history, past medical history, past social history, past surgical history and problem list.    Review of Systems   Constitutional: Negative for  "chills, diaphoresis and malaise/fatigue.   Cardiovascular:  Positive for chest pain. Negative for dyspnea on exertion, irregular heartbeat, leg swelling, near-syncope, orthopnea, palpitations, paroxysmal nocturnal dyspnea and syncope.   Respiratory:  Negative for cough, shortness of breath, sleep disturbances due to breathing and sputum production.    Gastrointestinal:  Negative for change in bowel habit.   Genitourinary:  Negative for urgency.   Neurological:  Negative for dizziness and headaches.   Psychiatric/Behavioral:  Negative for altered mental status.        Pertinent items are noted in HPI, all other systems reviewed and negative    /74 (BP Location: Left arm, Patient Position: Sitting, Cuff Size: Large Adult)   Pulse 79   Resp 18   Ht 182.9 cm (72\")   Wt 134 kg (296 lb)   SpO2 97%   BMI 40.14 kg/m² .  Objective     Constitutional:       Appearance: Not in distress.   Neck:      Vascular: JVD normal.   Pulmonary:      Effort: Pulmonary effort is normal.      Breath sounds: Normal breath sounds.   Cardiovascular:      Normal rate. Regular rhythm.   Pulses:     Intact distal pulses.   Edema:     Peripheral edema absent.   Abdominal:      General: Bowel sounds are normal.      Palpations: Abdomen is soft.   Musculoskeletal: Normal range of motion. Skin:     General: Skin is warm and dry.   Neurological:      General: No focal deficit present.      Mental Status: Oriented to person, place and time.             ECG 12 Lead    Date/Time: 10/20/2023 9:26 AM  Performed by: Herminia Olmos APRN    Authorized by: Herminia Olmos APRN  Comparison: not compared with previous ECG   Previous ECG: no previous ECG available  Rhythm: sinus rhythm  Rate: normal  BPM: 79  T inversion: I, aVL, V5 and V6          EKG ordered by and reviewed by me in office             Jamil Self  reports that he quit smoking today. His smoking use included cigarettes. He has a 40.00 pack-year smoking history. He has never used " smokeless tobacco.. I have educated him on the risk of diseases from using tobacco products such as cancer, COPD, and heart disease.

## 2023-10-24 ENCOUNTER — HOSPITAL ENCOUNTER (INPATIENT)
Facility: HOSPITAL | Age: 68
LOS: 1 days | Discharge: HOME | End: 2023-10-26
Attending: EMERGENCY MEDICINE | Admitting: STUDENT IN AN ORGANIZED HEALTH CARE EDUCATION/TRAINING PROGRAM
Payer: COMMERCIAL

## 2023-10-24 ENCOUNTER — LAB (OUTPATIENT)
Dept: LAB | Facility: HOSPITAL | Age: 68
End: 2023-10-24
Payer: COMMERCIAL

## 2023-10-24 ENCOUNTER — APPOINTMENT (OUTPATIENT)
Dept: GENERAL RADIOLOGY | Facility: HOSPITAL | Age: 68
End: 2023-10-24
Payer: COMMERCIAL

## 2023-10-24 DIAGNOSIS — I20.0 UNSTABLE ANGINA: ICD-10-CM

## 2023-10-24 DIAGNOSIS — I20.0 UNSTABLE ANGINA PECTORIS: Primary | ICD-10-CM

## 2023-10-24 DIAGNOSIS — I21.4 NSTEMI (NON-ST ELEVATED MYOCARDIAL INFARCTION): ICD-10-CM

## 2023-10-24 DIAGNOSIS — I24.9 ACUTE CORONARY SYNDROME: ICD-10-CM

## 2023-10-24 LAB
ALBUMIN SERPL-MCNC: 4.9 G/DL (ref 3.5–5.2)
ALBUMIN/GLOB SERPL: 1.8 G/DL
ALP SERPL-CCNC: 93 U/L (ref 39–117)
ALT SERPL W P-5'-P-CCNC: 9 U/L (ref 1–41)
ANION GAP SERPL CALCULATED.3IONS-SCNC: 12 MMOL/L (ref 5–15)
ANION GAP SERPL CALCULATED.3IONS-SCNC: 9.3 MMOL/L (ref 5–15)
APTT PPP: 26.3 SECONDS (ref 61–76.5)
AST SERPL-CCNC: 7 U/L (ref 1–40)
BASOPHILS # BLD AUTO: 0.1 10*3/MM3 (ref 0–0.2)
BASOPHILS NFR BLD AUTO: 1.2 % (ref 0–1.5)
BILIRUB SERPL-MCNC: 0.3 MG/DL (ref 0–1.2)
BUN SERPL-MCNC: 20 MG/DL (ref 8–23)
BUN SERPL-MCNC: 21 MG/DL (ref 8–23)
BUN/CREAT SERPL: 11.8 (ref 7–25)
BUN/CREAT SERPL: 12.7 (ref 7–25)
CALCIUM SPEC-SCNC: 10 MG/DL (ref 8.6–10.5)
CALCIUM SPEC-SCNC: 9.8 MG/DL (ref 8.6–10.5)
CHLORIDE SERPL-SCNC: 100 MMOL/L (ref 98–107)
CHLORIDE SERPL-SCNC: 103 MMOL/L (ref 98–107)
CO2 SERPL-SCNC: 26.7 MMOL/L (ref 22–29)
CO2 SERPL-SCNC: 28 MMOL/L (ref 22–29)
CREAT SERPL-MCNC: 1.66 MG/DL (ref 0.76–1.27)
CREAT SERPL-MCNC: 1.69 MG/DL (ref 0.76–1.27)
DEPRECATED RDW RBC AUTO: 43.3 FL (ref 37–54)
DEPRECATED RDW RBC AUTO: 43.3 FL (ref 37–54)
EGFRCR SERPLBLD CKD-EPI 2021: 43.7 ML/MIN/1.73
EGFRCR SERPLBLD CKD-EPI 2021: 44.6 ML/MIN/1.73
EOSINOPHIL # BLD AUTO: 0.3 10*3/MM3 (ref 0–0.4)
EOSINOPHIL NFR BLD AUTO: 2.9 % (ref 0.3–6.2)
ERYTHROCYTE [DISTWIDTH] IN BLOOD BY AUTOMATED COUNT: 13.3 % (ref 12.3–15.4)
ERYTHROCYTE [DISTWIDTH] IN BLOOD BY AUTOMATED COUNT: 13.5 % (ref 12.3–15.4)
GLOBULIN UR ELPH-MCNC: 2.7 GM/DL
GLUCOSE SERPL-MCNC: 130 MG/DL (ref 65–99)
GLUCOSE SERPL-MCNC: 205 MG/DL (ref 65–99)
HCT VFR BLD AUTO: 41.8 % (ref 37.5–51)
HCT VFR BLD AUTO: 43.4 % (ref 37.5–51)
HGB BLD-MCNC: 13.8 G/DL (ref 13–17.7)
HGB BLD-MCNC: 14.3 G/DL (ref 13–17.7)
INR PPP: <0.93 (ref 0.93–1.1)
INR PPP: <0.93 (ref 0.93–1.1)
LYMPHOCYTES # BLD AUTO: 1.6 10*3/MM3 (ref 0.7–3.1)
LYMPHOCYTES NFR BLD AUTO: 18.1 % (ref 19.6–45.3)
MCH RBC QN AUTO: 30.4 PG (ref 26.6–33)
MCH RBC QN AUTO: 30.9 PG (ref 26.6–33)
MCHC RBC AUTO-ENTMCNC: 32.9 G/DL (ref 31.5–35.7)
MCHC RBC AUTO-ENTMCNC: 32.9 G/DL (ref 31.5–35.7)
MCV RBC AUTO: 92.4 FL (ref 79–97)
MCV RBC AUTO: 93.7 FL (ref 79–97)
MONOCYTES # BLD AUTO: 0.6 10*3/MM3 (ref 0.1–0.9)
MONOCYTES NFR BLD AUTO: 6.9 % (ref 5–12)
NEUTROPHILS NFR BLD AUTO: 6.3 10*3/MM3 (ref 1.7–7)
NEUTROPHILS NFR BLD AUTO: 70.9 % (ref 42.7–76)
NRBC BLD AUTO-RTO: 0 /100 WBC (ref 0–0.2)
NT-PROBNP SERPL-MCNC: 149.7 PG/ML (ref 0–900)
PLATELET # BLD AUTO: 235 10*3/MM3 (ref 140–450)
PLATELET # BLD AUTO: 250 10*3/MM3 (ref 140–450)
PMV BLD AUTO: 9.1 FL (ref 6–12)
PMV BLD AUTO: 9.1 FL (ref 6–12)
POTASSIUM SERPL-SCNC: 4.1 MMOL/L (ref 3.5–5.2)
POTASSIUM SERPL-SCNC: 4.6 MMOL/L (ref 3.5–5.2)
PROT SERPL-MCNC: 7.6 G/DL (ref 6–8.5)
PROTHROMBIN TIME: 9.8 SECONDS (ref 9.6–11.7)
PROTHROMBIN TIME: 9.9 SECONDS (ref 9.6–11.7)
RBC # BLD AUTO: 4.46 10*6/MM3 (ref 4.14–5.8)
RBC # BLD AUTO: 4.7 10*6/MM3 (ref 4.14–5.8)
SODIUM SERPL-SCNC: 139 MMOL/L (ref 136–145)
SODIUM SERPL-SCNC: 140 MMOL/L (ref 136–145)
TROPONIN T SERPL HS-MCNC: 15 NG/L
WBC NRBC COR # BLD: 6.3 10*3/MM3 (ref 3.4–10.8)
WBC NRBC COR # BLD: 8.9 10*3/MM3 (ref 3.4–10.8)

## 2023-10-24 PROCEDURE — 25010000002 ONDANSETRON PER 1 MG: Performed by: EMERGENCY MEDICINE

## 2023-10-24 PROCEDURE — 85027 COMPLETE CBC AUTOMATED: CPT

## 2023-10-24 PROCEDURE — 80053 COMPREHEN METABOLIC PANEL: CPT

## 2023-10-24 PROCEDURE — 85730 THROMBOPLASTIN TIME PARTIAL: CPT | Performed by: EMERGENCY MEDICINE

## 2023-10-24 PROCEDURE — 85610 PROTHROMBIN TIME: CPT | Performed by: EMERGENCY MEDICINE

## 2023-10-24 PROCEDURE — 85025 COMPLETE CBC W/AUTO DIFF WBC: CPT | Performed by: EMERGENCY MEDICINE

## 2023-10-24 PROCEDURE — 25010000002 NITROGLYCERIN 200 MCG/ML SOLUTION: Performed by: EMERGENCY MEDICINE

## 2023-10-24 PROCEDURE — 83880 ASSAY OF NATRIURETIC PEPTIDE: CPT | Performed by: EMERGENCY MEDICINE

## 2023-10-24 PROCEDURE — 71045 X-RAY EXAM CHEST 1 VIEW: CPT

## 2023-10-24 PROCEDURE — 25010000002 HEPARIN (PORCINE) 25000-0.45 UT/250ML-% SOLUTION: Performed by: EMERGENCY MEDICINE

## 2023-10-24 PROCEDURE — 84484 ASSAY OF TROPONIN QUANT: CPT | Performed by: EMERGENCY MEDICINE

## 2023-10-24 PROCEDURE — 93005 ELECTROCARDIOGRAM TRACING: CPT | Performed by: EMERGENCY MEDICINE

## 2023-10-24 PROCEDURE — 36415 COLL VENOUS BLD VENIPUNCTURE: CPT

## 2023-10-24 PROCEDURE — 85610 PROTHROMBIN TIME: CPT

## 2023-10-24 PROCEDURE — 93005 ELECTROCARDIOGRAM TRACING: CPT

## 2023-10-24 PROCEDURE — 99285 EMERGENCY DEPT VISIT HI MDM: CPT

## 2023-10-24 RX ORDER — NITROGLYCERIN 20 MG/100ML
10-50 INJECTION INTRAVENOUS
Status: DISCONTINUED | OUTPATIENT
Start: 2023-10-24 | End: 2023-10-26 | Stop reason: HOSPADM

## 2023-10-24 RX ORDER — HEPARIN SODIUM 10000 [USP'U]/100ML
7.3 INJECTION, SOLUTION INTRAVENOUS
Status: DISCONTINUED | OUTPATIENT
Start: 2023-10-24 | End: 2023-10-25

## 2023-10-24 RX ORDER — ONDANSETRON 2 MG/ML
4 INJECTION INTRAMUSCULAR; INTRAVENOUS ONCE
Status: COMPLETED | OUTPATIENT
Start: 2023-10-24 | End: 2023-10-24

## 2023-10-24 RX ADMIN — HEPARIN SODIUM 7.3 UNITS/KG/HR: 10000 INJECTION, SOLUTION INTRAVENOUS at 23:23

## 2023-10-24 RX ADMIN — ONDANSETRON 4 MG: 2 INJECTION INTRAMUSCULAR; INTRAVENOUS at 23:22

## 2023-10-24 RX ADMIN — NITROGLYCERIN 10 MCG/MIN: 20 INJECTION INTRAVENOUS at 23:22

## 2023-10-24 NOTE — Clinical Note
First balloon inflation max pressure = 22 justin. First balloon inflation duration = 8 seconds. Second inflation of balloon - Max pressure = 22 justin. 2nd Inflation of balloon - Duration = 10 seconds.

## 2023-10-24 NOTE — Clinical Note
First balloon inflation max pressure = 15 justni. First balloon inflation duration = 45 seconds. Second inflation of balloon - Max pressure = 15 justin. 2nd Inflation of balloon - Duration = 20 seconds.

## 2023-10-24 NOTE — Clinical Note
First balloon inflation max pressure = 14 justin. First balloon inflation duration = 14 seconds. Second inflation of balloon - Max pressure = 14 justin. 2nd Inflation of balloon - Duration = 6 seconds. Third inflation of balloon - Max pressure = 14 justin. 3rd Inflation of balloon - Duration = 5 seconds.

## 2023-10-24 NOTE — LETTER
October 26, 2023     Patient: Jamil Self   YOB: 1955   Date of Visit: 10/24/2023       To Whom It May Concern:    It is my medical opinion that Jamil Self may return to work on 10/30/23 and is to be on light duty, no lifting over 5 pounds.           Sincerely,      Dev Lawton MD

## 2023-10-24 NOTE — Clinical Note
First balloon inflation max pressure = 12 justin. First balloon inflation duration = 12 seconds. Second inflation of balloon - Max pressure = 10 justin. 2nd Inflation of balloon - Duration = 5 seconds. Third inflation of balloon - Max pressure = 18 justin. 3rd Inflation of balloon - Duration = 14 seconds.

## 2023-10-24 NOTE — Clinical Note
First balloon inflation max pressure = 10 justin. First balloon inflation duration = 14 seconds. Second inflation of balloon - Max pressure = 18 justin. 2nd Inflation of balloon - Duration = 8 seconds. Third inflation of balloon - Max pressure = 16 justin. 3rd Inflation of balloon - Duration = 14 seconds.

## 2023-10-24 NOTE — Clinical Note
Level of Care: Telemetry [5]   Admitting Physician: DUARTE PÉREZ [[id number]]   Attending Physician: DUARTE PÉREZ [[id number]]

## 2023-10-24 NOTE — Clinical Note
First balloon inflation max pressure = 12 justin. First balloon inflation duration = 10 seconds. Second inflation of balloon - Max pressure = 15 justin. 2nd Inflation of balloon - Duration = 20 seconds.

## 2023-10-24 NOTE — Clinical Note
First balloon inflation max pressure = 10 justin. First balloon inflation duration = 10 seconds. Second inflation of balloon - Max pressure = 12 justin. 2nd Inflation of balloon - Duration = 12 seconds. Third inflation of balloon - Max pressure = 20 justin. 3rd Inflation of balloon - Duration = 18 seconds. Fourth inflation of balloon - Max pressure = 20 justin. 4th Inflation of balloon - Duration = 8 seconds.

## 2023-10-24 NOTE — Clinical Note
First balloon inflation max pressure = 15 justin. First balloon inflation duration = 10 seconds. Second inflation of balloon - Max pressure = 15 justin. 2nd Inflation of balloon - Duration = 8 seconds. Third inflation of balloon - Max pressure = 15 justin. 3rd Inflation of balloon - Duration = 5 seconds.

## 2023-10-25 PROBLEM — I21.4 NSTEMI (NON-ST ELEVATED MYOCARDIAL INFARCTION): Status: ACTIVE | Noted: 2023-10-25

## 2023-10-25 LAB
ACT BLD: 168 SECONDS (ref 89–137)
ACT BLD: 179 SECONDS (ref 89–137)
ACT BLD: 179 SECONDS (ref 89–137)
ACT BLD: 206 SECONDS (ref 89–137)
ACT BLD: 347 SECONDS (ref 89–137)
ACT BLD: 389 SECONDS (ref 89–137)
APTT PPP: 22.2 SECONDS (ref 61–76.5)
BACTERIA UR QL AUTO: ABNORMAL /HPF
BILIRUB UR QL STRIP: NEGATIVE
CHOLEST SERPL-MCNC: 102 MG/DL (ref 0–200)
CLARITY UR: CLEAR
COLOR UR: YELLOW
CREAT UR-MCNC: 59.2 MG/DL
DEPRECATED RDW RBC AUTO: 43.8 FL (ref 37–54)
ERYTHROCYTE [DISTWIDTH] IN BLOOD BY AUTOMATED COUNT: 13.6 % (ref 12.3–15.4)
GEN 5 2HR TROPONIN T REFLEX: 274 NG/L
GLUCOSE BLDC GLUCOMTR-MCNC: 115 MG/DL (ref 70–105)
GLUCOSE BLDC GLUCOMTR-MCNC: 118 MG/DL (ref 70–105)
GLUCOSE BLDC GLUCOMTR-MCNC: 127 MG/DL (ref 70–105)
GLUCOSE BLDC GLUCOMTR-MCNC: 159 MG/DL (ref 70–105)
GLUCOSE UR STRIP-MCNC: NEGATIVE MG/DL
HBA1C MFR BLD: 6.2 % (ref 4.8–5.6)
HCT VFR BLD AUTO: 41.1 % (ref 37.5–51)
HDLC SERPL-MCNC: 42 MG/DL (ref 40–60)
HGB BLD-MCNC: 13.8 G/DL (ref 13–17.7)
HGB UR QL STRIP.AUTO: ABNORMAL
HOLD SPECIMEN: NORMAL
HYALINE CASTS UR QL AUTO: ABNORMAL /LPF
KETONES UR QL STRIP: NEGATIVE
LDLC SERPL CALC-MCNC: 41 MG/DL (ref 0–100)
LDLC/HDLC SERPL: 0.94 {RATIO}
LEUKOCYTE ESTERASE UR QL STRIP.AUTO: NEGATIVE
MCH RBC QN AUTO: 31.1 PG (ref 26.6–33)
MCHC RBC AUTO-ENTMCNC: 33.7 G/DL (ref 31.5–35.7)
MCV RBC AUTO: 92.5 FL (ref 79–97)
NITRITE UR QL STRIP: NEGATIVE
PH UR STRIP.AUTO: 7.5 [PH] (ref 5–8)
PLATELET # BLD AUTO: 239 10*3/MM3 (ref 140–450)
PMV BLD AUTO: 9.3 FL (ref 6–12)
PROT ?TM UR-MCNC: 31.2 MG/DL
PROT UR QL STRIP: ABNORMAL
PROT/CREAT UR: 527 MG/G CREA (ref 0–200)
QT INTERVAL: 340 MS
QT INTERVAL: 348 MS
QTC INTERVAL: 414 MS
QTC INTERVAL: 435 MS
RBC # BLD AUTO: 4.45 10*6/MM3 (ref 4.14–5.8)
RBC # UR STRIP: ABNORMAL /HPF
REF LAB TEST METHOD: ABNORMAL
SODIUM UR-SCNC: 109 MMOL/L
SP GR UR STRIP: 1.05 (ref 1–1.03)
SQUAMOUS #/AREA URNS HPF: ABNORMAL /HPF
TRIGL SERPL-MCNC: 102 MG/DL (ref 0–150)
TROPONIN T DELTA: 176 NG/L
TROPONIN T SERPL HS-MCNC: 98 NG/L
UROBILINOGEN UR QL STRIP: ABNORMAL
VLDLC SERPL-MCNC: 19 MG/DL (ref 5–40)
WBC # UR STRIP: ABNORMAL /HPF
WBC NRBC COR # BLD: 8.9 10*3/MM3 (ref 3.4–10.8)
WHOLE BLOOD HOLD COAG: NORMAL
WHOLE BLOOD HOLD SPECIMEN: NORMAL

## 2023-10-25 PROCEDURE — 99153 MOD SED SAME PHYS/QHP EA: CPT | Performed by: INTERNAL MEDICINE

## 2023-10-25 PROCEDURE — 25010000002 EPTIFIBATIDE 20 MG/10ML SOLUTION: Performed by: EMERGENCY MEDICINE

## 2023-10-25 PROCEDURE — C1769 GUIDE WIRE: HCPCS | Performed by: INTERNAL MEDICINE

## 2023-10-25 PROCEDURE — 80061 LIPID PANEL: CPT | Performed by: NURSE PRACTITIONER

## 2023-10-25 PROCEDURE — B2151ZZ FLUOROSCOPY OF LEFT HEART USING LOW OSMOLAR CONTRAST: ICD-10-PCS | Performed by: INTERNAL MEDICINE

## 2023-10-25 PROCEDURE — 25810000003 SODIUM CHLORIDE 0.9 % SOLUTION: Performed by: INTERNAL MEDICINE

## 2023-10-25 PROCEDURE — 83036 HEMOGLOBIN GLYCOSYLATED A1C: CPT | Performed by: NURSE PRACTITIONER

## 2023-10-25 PROCEDURE — C1725 CATH, TRANSLUMIN NON-LASER: HCPCS | Performed by: INTERNAL MEDICINE

## 2023-10-25 PROCEDURE — C1894 INTRO/SHEATH, NON-LASER: HCPCS | Performed by: INTERNAL MEDICINE

## 2023-10-25 PROCEDURE — 85347 COAGULATION TIME ACTIVATED: CPT

## 2023-10-25 PROCEDURE — 25010000002 MORPHINE PER 10 MG: Performed by: EMERGENCY MEDICINE

## 2023-10-25 PROCEDURE — B2131ZZ FLUOROSCOPY OF MULTIPLE CORONARY ARTERY BYPASS GRAFTS USING LOW OSMOLAR CONTRAST: ICD-10-PCS | Performed by: INTERNAL MEDICINE

## 2023-10-25 PROCEDURE — B2181ZZ FLUOROSCOPY OF LEFT INTERNAL MAMMARY BYPASS GRAFT USING LOW OSMOLAR CONTRAST: ICD-10-PCS | Performed by: INTERNAL MEDICINE

## 2023-10-25 PROCEDURE — C1874 STENT, COATED/COV W/DEL SYS: HCPCS | Performed by: INTERNAL MEDICINE

## 2023-10-25 PROCEDURE — 25010000002 MIDAZOLAM PER 1 MG: Performed by: INTERNAL MEDICINE

## 2023-10-25 PROCEDURE — 3E033PZ INTRODUCTION OF PLATELET INHIBITOR INTO PERIPHERAL VEIN, PERCUTANEOUS APPROACH: ICD-10-PCS | Performed by: INTERNAL MEDICINE

## 2023-10-25 PROCEDURE — 25010000002 HYDROMORPHONE 1 MG/ML SOLUTION: Performed by: INTERNAL MEDICINE

## 2023-10-25 PROCEDURE — 82948 REAGENT STRIP/BLOOD GLUCOSE: CPT

## 2023-10-25 PROCEDURE — 4A023N7 MEASUREMENT OF CARDIAC SAMPLING AND PRESSURE, LEFT HEART, PERCUTANEOUS APPROACH: ICD-10-PCS | Performed by: INTERNAL MEDICINE

## 2023-10-25 PROCEDURE — 84300 ASSAY OF URINE SODIUM: CPT | Performed by: INTERNAL MEDICINE

## 2023-10-25 PROCEDURE — 93459 L HRT ART/GRFT ANGIO: CPT | Performed by: INTERNAL MEDICINE

## 2023-10-25 PROCEDURE — C9600 PERC DRUG-EL COR STENT SING: HCPCS | Performed by: INTERNAL MEDICINE

## 2023-10-25 PROCEDURE — 25010000002 NALOXONE PER 1 MG: Performed by: INTERNAL MEDICINE

## 2023-10-25 PROCEDURE — 85027 COMPLETE CBC AUTOMATED: CPT | Performed by: EMERGENCY MEDICINE

## 2023-10-25 PROCEDURE — 0272356 DILATION OF CORONARY ARTERY, THREE ARTERIES, BIFURCATION, WITH TWO DRUG-ELUTING INTRALUMINAL DEVICES, PERCUTANEOUS APPROACH: ICD-10-PCS | Performed by: INTERNAL MEDICINE

## 2023-10-25 PROCEDURE — 25010000002 NITROGLYCERIN 200 MCG/ML SOLUTION: Performed by: EMERGENCY MEDICINE

## 2023-10-25 PROCEDURE — 84156 ASSAY OF PROTEIN URINE: CPT | Performed by: INTERNAL MEDICINE

## 2023-10-25 PROCEDURE — 36415 COLL VENOUS BLD VENIPUNCTURE: CPT

## 2023-10-25 PROCEDURE — 25010000002 LORAZEPAM PER 2 MG: Performed by: NURSE PRACTITIONER

## 2023-10-25 PROCEDURE — 99152 MOD SED SAME PHYS/QHP 5/>YRS: CPT | Performed by: INTERNAL MEDICINE

## 2023-10-25 PROCEDURE — 25010000002 EPTIFIBATIDE PER 5 MG: Performed by: EMERGENCY MEDICINE

## 2023-10-25 PROCEDURE — 25010000002 NITROGLYCERIN 5 MG/ML SOLUTION: Performed by: INTERNAL MEDICINE

## 2023-10-25 PROCEDURE — 85730 THROMBOPLASTIN TIME PARTIAL: CPT | Performed by: STUDENT IN AN ORGANIZED HEALTH CARE EDUCATION/TRAINING PROGRAM

## 2023-10-25 PROCEDURE — 25010000002 LORAZEPAM PER 2 MG: Performed by: EMERGENCY MEDICINE

## 2023-10-25 PROCEDURE — 25010000002 HEPARIN (PORCINE) PER 1000 UNITS: Performed by: INTERNAL MEDICINE

## 2023-10-25 PROCEDURE — 25510000001 IOPAMIDOL PER 1 ML: Performed by: INTERNAL MEDICINE

## 2023-10-25 PROCEDURE — 84484 ASSAY OF TROPONIN QUANT: CPT | Performed by: INTERNAL MEDICINE

## 2023-10-25 PROCEDURE — 99223 1ST HOSP IP/OBS HIGH 75: CPT | Performed by: INTERNAL MEDICINE

## 2023-10-25 PROCEDURE — B2111ZZ FLUOROSCOPY OF MULTIPLE CORONARY ARTERIES USING LOW OSMOLAR CONTRAST: ICD-10-PCS | Performed by: INTERNAL MEDICINE

## 2023-10-25 PROCEDURE — 81001 URINALYSIS AUTO W/SCOPE: CPT | Performed by: INTERNAL MEDICINE

## 2023-10-25 PROCEDURE — 25010000002 FENTANYL CITRATE (PF) 100 MCG/2ML SOLUTION: Performed by: INTERNAL MEDICINE

## 2023-10-25 PROCEDURE — 93005 ELECTROCARDIOGRAM TRACING: CPT | Performed by: EMERGENCY MEDICINE

## 2023-10-25 PROCEDURE — 92928 PRQ TCAT PLMT NTRAC ST 1 LES: CPT | Performed by: INTERNAL MEDICINE

## 2023-10-25 PROCEDURE — C1887 CATHETER, GUIDING: HCPCS | Performed by: INTERNAL MEDICINE

## 2023-10-25 PROCEDURE — 82570 ASSAY OF URINE CREATININE: CPT | Performed by: INTERNAL MEDICINE

## 2023-10-25 DEVICE — XIENCE SKYPOINT™ EVEROLIMUS ELUTING CORONARY STENT SYSTEM 2.50 MM X 23 MM / RAPID-EXCHANGE
Type: IMPLANTABLE DEVICE | Site: CORNEA | Status: FUNCTIONAL
Brand: XIENCE SKYPOINT™

## 2023-10-25 DEVICE — XIENCE SKYPOINT™ EVEROLIMUS ELUTING CORONARY STENT SYSTEM 2.50 MM X 15 MM / RAPID-EXCHANGE
Type: IMPLANTABLE DEVICE | Site: CORNEA | Status: FUNCTIONAL
Brand: XIENCE SKYPOINT™

## 2023-10-25 RX ORDER — FUROSEMIDE 20 MG/1
20 TABLET ORAL DAILY
COMMUNITY

## 2023-10-25 RX ORDER — BISACODYL 5 MG/1
5 TABLET, DELAYED RELEASE ORAL DAILY PRN
Status: DISCONTINUED | OUTPATIENT
Start: 2023-10-25 | End: 2023-10-26 | Stop reason: HOSPADM

## 2023-10-25 RX ORDER — SODIUM CHLORIDE 9 MG/ML
75 INJECTION, SOLUTION INTRAVENOUS CONTINUOUS
Status: DISCONTINUED | OUTPATIENT
Start: 2023-10-25 | End: 2023-10-25

## 2023-10-25 RX ORDER — NALOXONE HYDROCHLORIDE 0.4 MG/ML
INJECTION, SOLUTION INTRAMUSCULAR; INTRAVENOUS; SUBCUTANEOUS
Status: DISCONTINUED | OUTPATIENT
Start: 2023-10-25 | End: 2023-10-25 | Stop reason: HOSPADM

## 2023-10-25 RX ORDER — FENTANYL CITRATE 50 UG/ML
INJECTION, SOLUTION INTRAMUSCULAR; INTRAVENOUS
Status: DISCONTINUED | OUTPATIENT
Start: 2023-10-25 | End: 2023-10-25 | Stop reason: HOSPADM

## 2023-10-25 RX ORDER — ASPIRIN 81 MG/1
81 TABLET ORAL DAILY
Status: DISCONTINUED | OUTPATIENT
Start: 2023-10-25 | End: 2023-10-26 | Stop reason: HOSPADM

## 2023-10-25 RX ORDER — IBUPROFEN 600 MG/1
1 TABLET ORAL
Status: DISCONTINUED | OUTPATIENT
Start: 2023-10-25 | End: 2023-10-26 | Stop reason: HOSPADM

## 2023-10-25 RX ORDER — ASPIRIN 81 MG/1
81 TABLET ORAL DAILY
Status: DISCONTINUED | OUTPATIENT
Start: 2023-10-26 | End: 2023-10-25

## 2023-10-25 RX ORDER — ACETAMINOPHEN 325 MG/1
650 TABLET ORAL EVERY 4 HOURS PRN
Status: DISCONTINUED | OUTPATIENT
Start: 2023-10-25 | End: 2023-10-26 | Stop reason: HOSPADM

## 2023-10-25 RX ORDER — HYDRALAZINE HYDROCHLORIDE 25 MG/1
25 TABLET, FILM COATED ORAL 2 TIMES DAILY
Status: DISCONTINUED | OUTPATIENT
Start: 2023-10-25 | End: 2023-10-26 | Stop reason: HOSPADM

## 2023-10-25 RX ORDER — HEPARIN SODIUM 1000 [USP'U]/ML
INJECTION, SOLUTION INTRAVENOUS; SUBCUTANEOUS
Status: DISCONTINUED | OUTPATIENT
Start: 2023-10-25 | End: 2023-10-25 | Stop reason: HOSPADM

## 2023-10-25 RX ORDER — FUROSEMIDE 20 MG/1
20 TABLET ORAL DAILY
Status: DISCONTINUED | OUTPATIENT
Start: 2023-10-25 | End: 2023-10-26 | Stop reason: HOSPADM

## 2023-10-25 RX ORDER — SODIUM CHLORIDE 9 MG/ML
3 INJECTION, SOLUTION INTRAVENOUS CONTINUOUS
Status: DISCONTINUED | OUTPATIENT
Start: 2023-10-25 | End: 2023-10-25

## 2023-10-25 RX ORDER — NITROGLYCERIN 5 MG/ML
INJECTION, SOLUTION INTRAVENOUS
Status: DISCONTINUED | OUTPATIENT
Start: 2023-10-25 | End: 2023-10-25 | Stop reason: HOSPADM

## 2023-10-25 RX ORDER — CLOPIDOGREL BISULFATE 75 MG/1
75 TABLET ORAL DAILY
Status: DISCONTINUED | OUTPATIENT
Start: 2023-10-25 | End: 2023-10-25

## 2023-10-25 RX ORDER — LOSARTAN POTASSIUM 100 MG/1
100 TABLET ORAL DAILY
COMMUNITY

## 2023-10-25 RX ORDER — BISACODYL 10 MG
10 SUPPOSITORY, RECTAL RECTAL DAILY PRN
Status: DISCONTINUED | OUTPATIENT
Start: 2023-10-25 | End: 2023-10-26 | Stop reason: HOSPADM

## 2023-10-25 RX ORDER — DEXTROSE MONOHYDRATE 25 G/50ML
25 INJECTION, SOLUTION INTRAVENOUS
Status: DISCONTINUED | OUTPATIENT
Start: 2023-10-25 | End: 2023-10-26 | Stop reason: HOSPADM

## 2023-10-25 RX ORDER — INSULIN LISPRO 100 [IU]/ML
2-7 INJECTION, SOLUTION INTRAVENOUS; SUBCUTANEOUS EVERY 6 HOURS SCHEDULED
Status: DISCONTINUED | OUTPATIENT
Start: 2023-10-25 | End: 2023-10-26 | Stop reason: HOSPADM

## 2023-10-25 RX ORDER — MUSCLE RUB CREAM 100; 150 MG/G; MG/G
1 CREAM TOPICAL
Status: DISCONTINUED | OUTPATIENT
Start: 2023-10-25 | End: 2023-10-26 | Stop reason: HOSPADM

## 2023-10-25 RX ORDER — SODIUM CHLORIDE 9 MG/ML
250 INJECTION, SOLUTION INTRAVENOUS ONCE AS NEEDED
Status: DISCONTINUED | OUTPATIENT
Start: 2023-10-25 | End: 2023-10-26 | Stop reason: HOSPADM

## 2023-10-25 RX ORDER — POLYETHYLENE GLYCOL 3350 17 G/17G
17 POWDER, FOR SOLUTION ORAL DAILY PRN
Status: DISCONTINUED | OUTPATIENT
Start: 2023-10-25 | End: 2023-10-26 | Stop reason: HOSPADM

## 2023-10-25 RX ORDER — LORAZEPAM 2 MG/ML
1 INJECTION INTRAMUSCULAR ONCE
Status: COMPLETED | OUTPATIENT
Start: 2023-10-25 | End: 2023-10-25

## 2023-10-25 RX ORDER — CARVEDILOL 6.25 MG/1
6.25 TABLET ORAL 2 TIMES DAILY WITH MEALS
Status: DISCONTINUED | OUTPATIENT
Start: 2023-10-25 | End: 2023-10-25 | Stop reason: SDUPTHER

## 2023-10-25 RX ORDER — CARVEDILOL 6.25 MG/1
6.25 TABLET ORAL 2 TIMES DAILY
Status: DISCONTINUED | OUTPATIENT
Start: 2023-10-25 | End: 2023-10-26 | Stop reason: HOSPADM

## 2023-10-25 RX ORDER — ATORVASTATIN CALCIUM 40 MG/1
80 TABLET, FILM COATED ORAL NIGHTLY
Status: DISCONTINUED | OUTPATIENT
Start: 2023-10-25 | End: 2023-10-26 | Stop reason: HOSPADM

## 2023-10-25 RX ORDER — EPTIFIBATIDE 0.75 MG/ML
15 INJECTION, SOLUTION INTRAVENOUS
Status: DISCONTINUED | OUTPATIENT
Start: 2023-10-25 | End: 2023-10-25

## 2023-10-25 RX ORDER — NITROGLYCERIN 0.4 MG/1
0.4 TABLET SUBLINGUAL
Status: DISCONTINUED | OUTPATIENT
Start: 2023-10-25 | End: 2023-10-25

## 2023-10-25 RX ORDER — NEBIVOLOL 10 MG/1
10 TABLET ORAL DAILY
Status: ON HOLD | COMMUNITY
End: 2023-10-25

## 2023-10-25 RX ORDER — MIDAZOLAM HYDROCHLORIDE 1 MG/ML
INJECTION INTRAMUSCULAR; INTRAVENOUS
Status: DISCONTINUED | OUTPATIENT
Start: 2023-10-25 | End: 2023-10-25 | Stop reason: HOSPADM

## 2023-10-25 RX ORDER — LOSARTAN POTASSIUM 50 MG/1
100 TABLET ORAL DAILY
Status: DISCONTINUED | OUTPATIENT
Start: 2023-10-25 | End: 2023-10-26 | Stop reason: HOSPADM

## 2023-10-25 RX ORDER — SODIUM CHLORIDE 9 MG/ML
100 INJECTION, SOLUTION INTRAVENOUS CONTINUOUS
Status: DISCONTINUED | OUTPATIENT
Start: 2023-10-25 | End: 2023-10-25

## 2023-10-25 RX ORDER — HYDRALAZINE HYDROCHLORIDE 25 MG/1
25 TABLET, FILM COATED ORAL 2 TIMES DAILY
COMMUNITY

## 2023-10-25 RX ORDER — ISOSORBIDE MONONITRATE 60 MG/1
60 TABLET, EXTENDED RELEASE ORAL DAILY
Status: DISCONTINUED | OUTPATIENT
Start: 2023-10-25 | End: 2023-10-26 | Stop reason: HOSPADM

## 2023-10-25 RX ORDER — LIDOCAINE HYDROCHLORIDE 10 MG/ML
INJECTION, SOLUTION EPIDURAL; INFILTRATION; INTRACAUDAL; PERINEURAL
Status: DISCONTINUED | OUTPATIENT
Start: 2023-10-25 | End: 2023-10-25 | Stop reason: HOSPADM

## 2023-10-25 RX ORDER — EPTIFIBATIDE 0.75 MG/ML
15 INJECTION, SOLUTION INTRAVENOUS CONTINUOUS
Status: DISCONTINUED | OUTPATIENT
Start: 2023-10-25 | End: 2023-10-25

## 2023-10-25 RX ORDER — EPTIFIBATIDE 20 MG/10ML
165 INJECTION INTRAVENOUS ONCE
Qty: 20 ML | Refills: 0 | Status: COMPLETED | OUTPATIENT
Start: 2023-10-25 | End: 2023-10-25

## 2023-10-25 RX ORDER — NICOTINE POLACRILEX 4 MG
15 LOZENGE BUCCAL
Status: DISCONTINUED | OUTPATIENT
Start: 2023-10-25 | End: 2023-10-26 | Stop reason: HOSPADM

## 2023-10-25 RX ORDER — SODIUM CHLORIDE 9 MG/ML
75 INJECTION, SOLUTION INTRAVENOUS CONTINUOUS
Status: DISCONTINUED | OUTPATIENT
Start: 2023-10-25 | End: 2023-10-26

## 2023-10-25 RX ORDER — NITROGLYCERIN 0.4 MG/1
0.4 TABLET SUBLINGUAL
Status: DISCONTINUED | OUTPATIENT
Start: 2023-10-25 | End: 2023-10-26 | Stop reason: HOSPADM

## 2023-10-25 RX ORDER — LORAZEPAM 2 MG/ML
0.5 INJECTION INTRAMUSCULAR ONCE
Status: COMPLETED | OUTPATIENT
Start: 2023-10-25 | End: 2023-10-25

## 2023-10-25 RX ORDER — AMLODIPINE BESYLATE 5 MG/1
10 TABLET ORAL DAILY
Status: DISCONTINUED | OUTPATIENT
Start: 2023-10-25 | End: 2023-10-26 | Stop reason: HOSPADM

## 2023-10-25 RX ORDER — ATORVASTATIN CALCIUM 20 MG/1
20 TABLET, FILM COATED ORAL DAILY
Status: DISCONTINUED | OUTPATIENT
Start: 2023-10-25 | End: 2023-10-25

## 2023-10-25 RX ORDER — ASPIRIN 81 MG/1
81 TABLET, CHEWABLE ORAL ONCE
Status: COMPLETED | OUTPATIENT
Start: 2023-10-25 | End: 2023-10-25

## 2023-10-25 RX ORDER — AMOXICILLIN 250 MG
2 CAPSULE ORAL 2 TIMES DAILY
Status: DISCONTINUED | OUTPATIENT
Start: 2023-10-25 | End: 2023-10-26 | Stop reason: HOSPADM

## 2023-10-25 RX ADMIN — EPTIFIBATIDE 15 MG/HR: 0.75 INJECTION INTRAVENOUS at 01:40

## 2023-10-25 RX ADMIN — EPTIFIBATIDE 22600 MCG: 2 INJECTION INTRAVENOUS at 01:39

## 2023-10-25 RX ADMIN — HYDRALAZINE HYDROCHLORIDE 25 MG: 25 TABLET, FILM COATED ORAL at 16:47

## 2023-10-25 RX ADMIN — ASPIRIN 81 MG: 81 TABLET, COATED ORAL at 13:29

## 2023-10-25 RX ADMIN — LOSARTAN POTASSIUM 100 MG: 50 TABLET, FILM COATED ORAL at 16:47

## 2023-10-25 RX ADMIN — ISOSORBIDE MONONITRATE 60 MG: 60 TABLET, EXTENDED RELEASE ORAL at 16:47

## 2023-10-25 RX ADMIN — LORAZEPAM 0.5 MG: 2 INJECTION INTRAMUSCULAR; INTRAVENOUS at 04:53

## 2023-10-25 RX ADMIN — EPTIFIBATIDE 15 MG/HR: 0.75 INJECTION INTRAVENOUS at 06:40

## 2023-10-25 RX ADMIN — FUROSEMIDE 20 MG: 20 TABLET ORAL at 16:47

## 2023-10-25 RX ADMIN — MORPHINE SULFATE 4 MG: 4 INJECTION, SOLUTION INTRAMUSCULAR; INTRAVENOUS at 01:12

## 2023-10-25 RX ADMIN — SODIUM CHLORIDE 75 ML/HR: 9 INJECTION, SOLUTION INTRAVENOUS at 08:01

## 2023-10-25 RX ADMIN — CARVEDILOL 6.25 MG: 6.25 TABLET, FILM COATED ORAL at 13:29

## 2023-10-25 RX ADMIN — ASPIRIN 81 MG CHEWABLE TABLET 81 MG: 81 TABLET CHEWABLE at 01:33

## 2023-10-25 RX ADMIN — HYDROMORPHONE HYDROCHLORIDE 0.5 MG: 1 INJECTION, SOLUTION INTRAMUSCULAR; INTRAVENOUS; SUBCUTANEOUS at 13:50

## 2023-10-25 RX ADMIN — LORAZEPAM 1 MG: 2 INJECTION INTRAMUSCULAR; INTRAVENOUS at 01:34

## 2023-10-25 RX ADMIN — EPTIFIBATIDE 15 MG/HR: 0.75 INJECTION INTRAVENOUS at 01:57

## 2023-10-25 RX ADMIN — AMLODIPINE BESYLATE 10 MG: 5 TABLET ORAL at 13:29

## 2023-10-25 RX ADMIN — MUSCLE RUB CREAM 1 APPLICATION: 100; 150 CREAM TOPICAL at 04:54

## 2023-10-25 RX ADMIN — ATORVASTATIN CALCIUM 80 MG: 40 TABLET, FILM COATED ORAL at 20:49

## 2023-10-25 RX ADMIN — MUSCLE RUB CREAM 1 APPLICATION: 100; 150 CREAM TOPICAL at 15:54

## 2023-10-25 NOTE — ED PROVIDER NOTES
Subjective   History of Present Illness  67-year-old male history of CABG and previous multiple stents had the onset of severe chest pain this evening the patient took nitroglycerin without much improvement.  He states the pain started at about 8:00.  He is states that associated with shortness of breath and briefly diaphoresis.  He states he took sublingual nitro and that transiently resolved the pain and then the pain returned.  He states he never got pain-free once the pain started.  He reports that he had no palpitations.  Reports no recent fever chills or cough.  He states he was scheduled for cardiac catheterization in the morning by Dr. Lawton.  He rates the pain as severe      Review of Systems   Constitutional:  Negative for chills and fever.   HENT:  Negative for trouble swallowing.    Eyes:  Negative for discharge.   Respiratory:  Positive for chest tightness and shortness of breath. Negative for cough, choking, wheezing and stridor.    Cardiovascular:  Positive for chest pain. Negative for palpitations and leg swelling.   Gastrointestinal:  Positive for nausea.   Hematological:  Does not bruise/bleed easily.   All other systems reviewed and are negative.      Past Medical History:   Diagnosis Date    Coronary artery disease     Diabetes mellitus     Hyperlipidemia     Hypertension     LIZETTE (obstructive sleep apnea)      Patient states that he has had stents on 2 occasions and is distantly status post CABG  No Known Allergies    Past Surgical History:   Procedure Laterality Date    CARDIAC CATHETERIZATION N/A 03/21/2021    Procedure: Left Heart Cath;  Surgeon: Jourdan Pillai MD;  Location: [facility] INVASIVE LOCATION;  Service: Cardiology;  Laterality: N/A;    CARDIAC CATHETERIZATION N/A 1/27/2023    Procedure: Left Heart Cath;  Surgeon: Dev Lawton MD;  Location: [facility] CATH INVASIVE LOCATION;  Service: Cardiology;  Laterality: N/A;    CAROTID STENT      CORONARY ARTERY BYPASS  GRAFT      , by patient's guess    CORONARY STENT PLACEMENT         Family History   Problem Relation Age of Onset    No Known Problems Mother     No Known Problems Father        Social History     Socioeconomic History    Marital status:    Tobacco Use    Smoking status: Former     Packs/day: 1.00     Years: 40.00     Additional pack years: 0.00     Total pack years: 40.00     Types: Cigarettes     Quit date: 10/20/2023     Years since quittin.0    Smokeless tobacco: Never   Vaping Use    Vaping Use: Never used   Substance and Sexual Activity    Alcohol use: Never    Drug use: Never    Sexual activity: Defer     Patient continues to smoke 1 pack/day      Objective   Physical Exam  Alert Jenny Coma Scale 15   HEENT: Pupils equal and reactive to light. Conjunctivae are not injected. Normal tympanic membranes. Oropharynx and nares are normal.   Neck: Supple. Midline trachea. No JVD. No goiter.   Chest: Clear and equal breath sounds bilaterally, regular rate and rhythm without murmur or rub.  No S3 or S4 occasional unifocal PVC   Abdomen: Positive bowel sounds, nontender, nondistended. No rebound or peritoneal signs. No CVA tenderness.   Extremities no clubbing. cyanosis or edema. Motor sensory exam is normal. The full range of motion is intact   Skin: Warm and dry, no rashes or petechia.   Lymphatic: No regional lymphadenopathy. No calf pain, swelling or Homans sign    Procedures           ED Course      Labs Reviewed   COMPREHENSIVE METABOLIC PANEL - Abnormal; Notable for the following components:       Result Value    Glucose 205 (*)     Creatinine 1.69 (*)     eGFR 43.7 (*)     All other components within normal limits    Narrative:     GFR Normal >60  Chronic Kidney Disease <60  Kidney Failure <15     PROTIME-INR - Abnormal; Notable for the following components:    INR <0.93 (*)     All other components within normal limits   APTT - Abnormal; Notable for the following components:    PTT 26.3 (*)      All other components within normal limits   SINGLE HSTROPONIN T - Abnormal; Notable for the following components:    HS Troponin T 15 (*)     All other components within normal limits    Narrative:     High Sensitive Troponin T Reference Range:  <10.0 ng/L- Negative Female for AMI  <15.0 ng/L- Negative Male for AMI  >=10 - Abnormal Female indicating possible myocardial injury.  >=15 - Abnormal Male indicating possible myocardial injury.   Clinicians would have to utilize clinical acumen, EKG, Troponin, and serial changes to determine if it is an Acute Myocardial Infarction or myocardial injury due to an underlying chronic condition.        CBC WITH AUTO DIFFERENTIAL - Abnormal; Notable for the following components:    Lymphocyte % 18.1 (*)     All other components within normal limits   BNP (IN-HOUSE) - Normal    Narrative:     This assay is used as an aid in the diagnosis of individuals suspected of having heart failure. It can be used as an aid in the diagnosis of acute decompensated heart failure (ADHF) in patients presenting with signs and symptoms of ADHF to the emergency department (ED). In addition, NT-proBNP of <300 pg/mL indicates ADHF is not likely.    Age Range Result Interpretation  NT-proBNP Concentration (pg/mL:      <50             Positive            >450                   Gray                 300-450                    Negative             <300    50-75           Positive            >900                  Gray                300-900                  Negative            <300      >75             Positive            >1800                  Gray                300-1800                  Negative            <300   RAINBOW DRAW    Narrative:     The following orders were created for panel order [place] Draw.  Procedure                               Abnormality         Status                     ---------                               -----------         ------                     [facility]  (Gel)[[id number]]                                  Final result               Lavender Top[[id number]]                                     Final result               Gold Top - SST[[id number]]                                   Final result               Light Blue Top[[id number]]                                   Final result                 Please view results for these tests on the individual orders.   APTT   CBC (NO DIFF)   GREEN TOP   LAVENDER TOP   GOLD TOP - SST   LIGHT BLUE TOP   CBC AND DIFFERENTIAL    Narrative:     The following orders were created for panel order CBC & Differential.  Procedure                               Abnormality         Status                     ---------                               -----------         ------                     CBC Auto Differential[[id number]]        Abnormal            Final result                 Please view results for these tests on the individual orders.     Medications   nitroglycerin (TRIDIL) 200 mcg/ml infusion (30 mcg/min Intravenous Rate/Dose Change 10/25/23 0057)   heparin 83763 units/250 mL (100 units/mL) in 0.45 % NaCl infusion (7.3 Units/kg/hr × 137 kg Intravenous Currently Infusing 10/24/23 2324)   heparin bolus from bag 2,500 Units (has no administration in time range)   heparin bolus from bag 5,000 Units (has no administration in time range)   Eptifibatide (INTEGRILIN) injection 22,600 mcg (has no administration in time range)     Followed by   eptifibatide (INTEGRILIN) 75 mg in 100 mL solution (has no administration in time range)   aspirin chewable tablet 81 mg (has no administration in time range)   LORazepam (ATIVAN) injection 1 mg (has no administration in time range)   ondansetron (ZOFRAN) injection 4 mg (4 mg Intravenous Given 10/24/23 2322)   heparin bolus from bag 5,000 Units (5,000 Units Intravenous Bolus from Bag 10/24/23 2324)   morphine injection 4 mg (4 mg Intravenous Given 10/25/23 0112)     XR Chest 1 View    Result Date:  10/24/2023  Impression: No active disease Electronically Signed: Ezequiel Hedrick MD  10/24/2023 11:30 PM EDT  Workstation ID: [id number]                                        Medical Decision Making  Time of dictation the patient was on 30 mics per minute of nitro and still having significant pain the patient had received 2 doses of morphine sulfate 4 mg without much improvement.  The patient had been heparinized per ACS protocol.  The case was discussed with Dr. Chalino Farnsworth who is on-call.  The interventionist was Dr. Peralta.  Dr. Allred recommended we contact her and she will evaluate the patient for potential emergent catheterization.  The patient will be started on Integrilin    Amount and/or Complexity of Data Reviewed  Independent Historian: spouse  External Data Reviewed: notes.  Labs: ordered. Decision-making details documented in ED Course.  Radiology: ordered and independent interpretation performed.  ECG/medicine tests: ordered.     Details: EKG #1 was similar to previous  EKG #2  Discussion of management or test interpretation with external provider(s): This was discussed with Dr. Oconnell and Dr. Alberts.    Risk  OTC drugs.  Prescription drug management.  Parenteral controlled substances.  Decision regarding hospitalization.  Emergency major surgery.    Critical Care  Total time providing critical care: 35 minutes (Please note that 35 minutes were spent with care and stabilization of this patient for critical care time exclusive of any procedures performed)        Final diagnoses:   Unstable angina pectoris   Acute coronary syndrome       ED Disposition  ED Disposition       None            No follow-up provider specified.       Medication List      No changes were made to your prescriptions during this visit.          components:    Activated Clotting Time  168 (*)     All other components within normal limits   POCT ACTIVATED CLOTTING TIME - Abnormal; Notable for the following components:    Activated Clotting Time  179 (*)     All other components within normal limits   POCT GLUCOSE FINGERSTICK - Abnormal; Notable for the following components:    Glucose 118 (*)     All other components within normal limits   POCT GLUCOSE FINGERSTICK - Abnormal; Notable for the following components:    Glucose 115 (*)     All other components within normal limits   POCT GLUCOSE FINGERSTICK - Abnormal; Notable for the following components:    Glucose 109 (*)     All other components within normal limits   POCT GLUCOSE FINGERSTICK - Abnormal; Notable for the following components:    Glucose 119 (*)     All other components within normal limits   BNP (IN-HOUSE) - Normal    Narrative:     This assay is used as an aid in the diagnosis of individuals suspected of having heart failure. It can be used as an aid in the diagnosis of acute decompensated heart failure (ADHF) in patients presenting with signs and symptoms of ADHF to the emergency department (ED). In addition, NT-proBNP of <300 pg/mL indicates ADHF is not likely.    Age Range Result Interpretation  NT-proBNP Concentration (pg/mL:      <50             Positive            >450                   Gray                 300-450                    Negative             <300    50-75           Positive            >900                  Gray                300-900                  Negative            <300      >75             Positive            >1800                  Gray                300-1800                  Negative            <300   CBC (NO DIFF) - Normal   RAINBOW DRAW    Narrative:     The following orders were created for panel order [place] Draw.  Procedure                               Abnormality         Status                     ---------                               -----------          ------                     Green Top (Gel)[[id number]]                                  Final result               Lavender Top[[id number]]                                     Final result               Gold Top - SST[[id number]]                                   Final result               Light Blue Top[[id number]]                                   Final result                 Please view results for these tests on the individual orders.   LIPID PANEL    Narrative:     Cholesterol Reference Ranges  (U.S. Department of Health and Human Services ATP III Classifications)    Desirable          <200 mg/dL  Borderline High    200-239 mg/dL  High Risk          >240 mg/dL      Triglyceride Reference Ranges  (U.S. Department of Health and Human Services ATP III Classifications)    Normal           <150 mg/dL  Borderline High  150-199 mg/dL  High             200-499 mg/dL  Very High        >500 mg/dL    HDL Reference Ranges  (U.S. Department of Health and Human Services ATP III Classifications)    Low     <40 mg/dl (major risk factor for CHD)  High    >60 mg/dl ('negative' risk factor for CHD)        LDL Reference Ranges  (U.S. Department of Health and Human Services ATP III Classifications)    Optimal          <100 mg/dL  Near Optimal     100-129 mg/dL  Borderline High  130-159 mg/dL  High             160-189 mg/dL  Very High        >189 mg/dL   SODIUM, URINE, RANDOM    Narrative:     Reference intervals for random urine have not been established.  Clinical usage is dependent upon physician's interpretation in combination with other laboratory tests.      POCT GLUCOSE FINGERSTICK   POCT GLUCOSE FINGERSTICK   POCT GLUCOSE FINGERSTICK   POCT GLUCOSE FINGERSTICK   POCT GLUCOSE FINGERSTICK   POCT GLUCOSE FINGERSTICK   POCT GLUCOSE FINGERSTICK   POCT GLUCOSE FINGERSTICK   POCT GLUCOSE FINGERSTICK   GREEN TOP   LAVENDER TOP   GOLD TOP - SST   LIGHT BLUE TOP   CBC AND DIFFERENTIAL    Narrative:     The following orders were created  for panel order CBC & Differential.  Procedure                               Abnormality         Status                     ---------                               -----------         ------                     CBC Auto Differential[[id number]]        Abnormal            Final result                 Please view results for these tests on the individual orders.     Medications   ondansetron (ZOFRAN) injection 4 mg (4 mg Intravenous Given 10/24/23 2322)   heparin bolus from bag 5,000 Units (5,000 Units Intravenous Bolus from Bag 10/24/23 2324)   morphine injection 4 mg (4 mg Intravenous Given 10/25/23 0112)   Eptifibatide (INTEGRILIN) injection 22,600 mcg (22,600 mcg Intravenous Given 10/25/23 0139)   aspirin chewable tablet 81 mg (81 mg Oral Given 10/25/23 0133)   LORazepam (ATIVAN) injection 1 mg (1 mg Intravenous Given 10/25/23 0134)   LORazepam (ATIVAN) injection 0.5 mg (0.5 mg Intravenous Given 10/25/23 0453)   HYDROmorphone (DILAUDID) injection 0.5 mg (0.5 mg Intravenous Given 10/25/23 1350)     No radiology results for the last day                                       Medical Decision Making  Time of dictation the patient was on 30 mics per minute of nitro and still having significant pain the patient had received 2 doses of morphine sulfate 4 mg without much improvement.  The patient had been heparinized per ACS protocol.  The case was discussed with Dr. Chalino Farnsworth who is on-call.  The interventionist was Dr. Peralta.  Dr. Allred recommended we contact her and she will evaluate the patient for potential emergent catheterization.  The patient will be started on Integrilin    Problems Addressed:  Acute coronary syndrome: complicated acute illness or injury  Unstable angina pectoris: complicated acute illness or injury    Amount and/or Complexity of Data Reviewed  Independent Historian: spouse  External Data Reviewed: notes.  Labs: ordered. Decision-making details documented in ED Course.  Radiology: ordered and  independent interpretation performed.  ECG/medicine tests: ordered.     Details: EKG #1 was similar to previous  EKG #2  Discussion of management or test interpretation with external provider(s): This was discussed with Dr. Oconnell and Dr. Alberts.    Risk  OTC drugs.  Prescription drug management.  Parenteral controlled substances.  Decision regarding hospitalization.  Emergency major surgery.    Critical Care  Total time providing critical care: 35 minutes (Please note that 35 minutes were spent with care and stabilization of this patient for critical care time exclusive of any procedures performed)        Final diagnoses:   Unstable angina pectoris   Acute coronary syndrome       ED Disposition  ED Disposition       ED Disposition   Decision to Admit    Condition   --    Comment   Level of Care: Telemetry [5]   Diagnosis: NSTEMI (non-ST elevated myocardial infarction) [699438]   Admitting Physician: DUARTE PÉREZ [[id number]]   Attending Physician: DUARTE PÉREZ [[id number]]   Certification: I Certify That Inpatient Hospital Services Are Medically Necessary For Greater Than 2 Midnights                 Desiree Almaguer, APRN  [address] [place] [id number] [phone]               Medication List        New Prescriptions      ticagrelor 90 MG tablet tablet  Commonly known as: BRILINTA  Take 1 tablet by mouth 2 (Two) Times a Day.            Stop      clopidogrel 75 MG tablet  Commonly known as: PLAVIX               Where to Get Your Medications        These medications were sent to [facility]/pharmacy #[id number] - [facility] [place] - [address] - [phone] [place] [id number]   [address] [place] [address]      Phone: [phone]   ticagrelor 90 MG tablet tablet            Alex Billy MD  11/10/23 0137

## 2023-10-25 NOTE — PROGRESS NOTES
[facility]     Progress Note    Patient Name: Jamil Self  : 1955  MRN: [id number]  Primary Care Physician:  Desiree Almaguer APRN  Date of admission: 10/24/2023  Service date and time: 10/25/23 12:06 EDT  Subjective   Subjective     Chief Complaint: chest pain    HPI:  Patient still complaining of mild chest pain.      Objective   Objective     Vitals:   Temp:  [97.8 °F (36.6 °C)-98.2 °F (36.8 °C)] 97.8 °F (36.6 °C)  Heart Rate:  [76-98] 89  Resp:  [12-20] 20  BP: (108-208)/() 136/89  Flow (L/min):  [2-4] 4  Physical Exam    Constitutional: Awake, alert oriented in no distress at time of examination.   Eyes: PERRLA, sclerae anicteric, no conjunctival injection   HENT: NCAT, mucous membranes moist   Neck: Supple, no thyromegaly, no lymphadenopathy, trachea midline   Respiratory: Clear to auscultation bilaterally, nonlabored respirations    Cardiovascular: RRR, no murmurs, rubs, or gallops, palpable pedal pulses bilaterally   Gastrointestinal: Positive bowel sounds, soft, nontender, nondistended   Musculoskeletal: No bilateral ankle edema, no clubbing or cyanosis to extremities   Psychiatric: Appropriate affect, cooperative   Neurologic: Oriented x 3, strength symmetric in all extremities, Cranial Nerves grossly intact to confrontation, speech clear   Skin: No rashes     Result Review    Result Review:  I have personally reviewed the results from the time of this admission to 10/25/2023 12:06 EDT and agree with these findings:  [x]  Laboratory list / accordion  []  Microbiology  []  Radiology  []  EKG/Telemetry   []  Cardiology/Vascular   []  Pathology  []  Old records  []  Other:  Most notable findings include: Troponin T 274 total cholesterol 102 triglyceride 102 HDL 42, LDL 41, VLDL cholesterol 19, A1c level 6.2      Assessment & Plan   Assessment / Plan       Active Hospital Problems:  Active Hospital Problems    Diagnosis     **NSTEMI (non-ST elevated myocardial infarction)     Unstable  angina      Plan:    NSTEMI  -Opponent trended from 15-98  - hx CAD s/p CABG and PCI   -Chest pain has now resolved on nitroglycerin drip  -Patient was evaluated by cardiology and plan for cardiac cath today  -Continue heparin and Integrilin  -Cardiology also recommended continue aspirin and Plavix  -Monitor for any sign of bleed  -Status post cardiac cath   - 1.  NSTEMI, chest pain unstable angina culprit distal left main compromising flow to a unbypassed circumflex distribution, also ostial LAD compromising septal and proximal diagonal branch status post intervention with 2.5 x 15 to the LAD postdilated 3.0 high-pressure with 2.5 x 23 2 the circumflex back to ostial left main given  of the LAD distally postdilated 3.0 distally at high pressure and 3.5 in the left main back to the ostium with flaring of the ostial portion of the stent at 22 justin with great angiographic results of the intervened upon segment.  2.  Residual small vessel disease in the second obtuse marginal branch proximally which can be staged at a later date if he continues to have chest pain or unstable angina  3.  Aspirin Brilinta, high intensity statin, afterload reduction for goal blood pressure less than 135 systolic, will admit to the hospital further optimizatio  Hypertension  -Blood pressure stable  -We will hold blood pressure medications for now     Acute kidney injury on chronic kidney disease  - Gentle hydration  - consult nephrology   - avoid nephrotoxic medication     Hyperlipidemia  -We will increase statin from 20 mg to 80 mg  -Check lipid profile     Diabetes  -Hold metformin  -A1c 6.0 1/28/23  - SS prn      LIZETTE  - O2 prn      Tobacco dependency  - pt denies need for nicotine patch  - tobacco cessation education     Obesity  - BMI 40.87   - recommend diet and lifestyle modification geared at weight loss        DVT prophylaxis:  No DVT prophylaxis order currently exists.    CODE STATUS:   Code Status (Patient has no pulse and is  not breathing): CPR (Attempt to Resuscitate)  Medical Interventions (Patient has pulse or is breathing): Full Support    Disposition:  I expect patient to be discharged in 1 day.    Teddy Harrison MD

## 2023-10-25 NOTE — PAYOR COMM NOTE
"  CLINICAL SUPPORT - REF [id number]    Please advise if additional clinical is needed to process this request.  Thank you!    Ela Oscar  Utilization Review Coordinator  [facility]  [address] [place]  [address]  Ph: [phone]  Fx: [phone]    .  FAXING CLINICAL -[phone]       Libby Loznao (67 y.o. Male)       Date of Birth   1955    Social Security Number       Address   [address] [place] [address] [place] [address]    Home Phone   [phone]    MRN   [id number]       Sabianism   Tenriism    Marital Status                               Admission Date   10/24/23    Admission Type   Emergency    Admitting Provider   Sandee Araya DO    Attending Provider   Teddy Harrison MD    Department, Room/Bed   [facility], 1111/1       Discharge Date       Discharge Disposition       Discharge Destination                                 Attending Provider: Teddy Harrison MD    Allergies: No Known Allergies    Isolation: None   Infection: None   Code Status: CPR    Ht: 182.9 cm (72\")   Wt: 137 kg (301 lb 5.9 oz)    Admission Cmt: None   Principal Problem: NSTEMI (non-ST elevated myocardial infarction) [I21.4]                   Active Insurance as of 10/24/2023       Primary Coverage       Payor Plan Insurance Group Employer/Plan Group    [employer]HEM BLUE CROSS ANTHEM BLUE CROSS BLUE SHIELD PPO [id number]       Payor Plan Address Payor Plan Phone Number Payor Plan Fax Number Effective Dates    [address] [phone]  2/13/2021 - None Entered    [place] [address]         Subscriber Name Subscriber Birth Date Member ID       LIBBY LOZANO 1955 [id number]                     Emergency Contacts        (Rel.) Home Phone Work Phone Mobile Phone    RASHEEDA LOZANO (Spouse) -- -- [phone]             Myocardial Infarction [facility] Inpatient Care       Indications Met   Last updated by Cheyanne Ayala, CHRISTIANO on 10/25/2023 0808     Review " Status Created By   Primary Completed Cheyanne Ayala RN      Criteria Review   Myocardial Infarction [facility] Inpatient Care     Overall Determination: Indications Met     Criteria:  [×] Admission is indicated for  1 or more  of the following :      [×] Acute myocardial infarction (MI) [G] [H] (not in context of cardiac procedure within last 48 hours), as indicated by  ALL  of the following :          [×] Elevated cardiac troponin level, [I] [J] [K] [L] as indicated by  1 or more  of the following :              [×] New or presumed new elevation of cardiac troponin level (ie, elevation clearly not due to chronic condition)          [×] Myocardial injury due to acute ischemia, as indicated by  1 or more  of the following :              [×] Symptoms consistent with myocardial ischemia (eg, chest pain, dyspnea)     Notes:  -- 10/25/2023  8:08 AM by Cheyanne Ayala RN --      NSTEMI      -Opponent trended from 15-98      - hx CAD s/p CABG and PCI       -Chest pain has now resolved on nitroglycerin drip      -Patient was evaluated by cardiology and plan for cardiac cath today      -Continue heparin and Integrilin      -Cardiology also recommended continue aspirin and Plavix      -Monitor for any sign of bleed            Hypertension      -Blood pressure stable      -We will hold blood pressure medications for now            Acute kidney injury on chronic kidney disease      - Gentle hydration      - consult nephrology       - avoid nephrotoxic medication                     CARDIAC CATH TODAY PENDING                  History & Physical        Sunni Cai APRN at 10/25/23 0351       Attestation signed by Sandee Araya DO at 10/25/23 0552    I have reviewed this documentation and agree.      Electronically signed by Sandee Araya DO, 10/25/23, 5:52 AM EDT.                      [facility]  History & Physical    Patient Name: Jamil Self  : 1955  MRN:  [id number]  Primary Care Physician:  Desiree Almaguer APRN  Date of admission: 10/24/2023  Date and Time of Service: 10/25/23 at 0350    Subjective      Chief Complaint: chest pain    History of Present Illness: Jamil Self is a 67 y.o. male with past medical history of coronary artery disease status post PCI and CABG, hypertension, hyperlipidemia, diabetes, obstructive sleep apnea, tobacco dependency and chronic kidney disease who presented to [facility] on 10/24/2023 complaining of this pain    Patient reports pain has been intermittent over the past couple weeks.  Normally pain will be present when getting ready for work or on his way home from work but not usually during work hours where he sits at a computer.  He had gone to see his primary cardiologist 5 days ago and was diagnosed with unstable angina and was placed on Lasix and Entresto.  Patient reports pain resolved until last night when he was watching television and had a sudden onset of chest pain that was stabbing in the left-sided chest.  He reports he took a nitroglycerin and initially pain had subsided but then returned so he took a second nitroglycerin and this time pain did not subside and he came to the ER for evaluation.  Patient denies any increase in shortness of breath or nausea.  Pain did not radiate.  Positive for diaphoresis.    In the ED patient was placed on nitroglycerin, heparin drip and Integrilin.  Pain is now subsided.  He has been diagnosed with an NSTEMI as troponins have trended from 15-98.  Plan is for patient to have a cardiac cath today with his primary cardiologist as he had previously been scheduled.      Review of Systems   Constitutional: Positive for diaphoresis.   Cardiovascular:  Positive for chest pain.   All other systems reviewed and are negative.       Personal History     Past Medical History:   Diagnosis Date    Coronary artery disease     Diabetes mellitus     Hyperlipidemia     Hypertension     LIZETTE  (obstructive sleep apnea)        Past Surgical History:   Procedure Laterality Date    CARDIAC CATHETERIZATION N/A 03/21/2021    Procedure: Left Heart Cath;  Surgeon: Jourdan Pillai MD;  Location: [facility] CATH INVASIVE LOCATION;  Service: Cardiology;  Laterality: N/A;    CARDIAC CATHETERIZATION N/A 1/27/2023    Procedure: Left Heart Cath;  Surgeon: Dev Lawton MD;  Location: [facility] CATH INVASIVE LOCATION;  Service: Cardiology;  Laterality: N/A;    CAROTID STENT      CORONARY ARTERY BYPASS GRAFT      1990's, by patient's guess    CORONARY STENT PLACEMENT         Family History: family history includes No Known Problems in his father and mother. Otherwise pertinent FHx was reviewed and not pertinent to current issue.    Social History:  reports that he quit smoking 5 days ago. His smoking use included cigarettes. He has a 40.00 pack-year smoking history. He has never used smokeless tobacco. He reports that he does not drink alcohol and does not use drugs.    Home Medications:  Prior to Admission Medications       Prescriptions Last Dose Informant Patient Reported? Taking?    amLODIPine (NORVASC) 10 MG tablet   Yes No    Take 1 tablet by mouth Daily.    aspirin 81 MG EC tablet   No No    Take 1 tablet by mouth Daily.    atorvastatin (LIPITOR) 20 MG tablet   Yes No    Take 1 tablet by mouth Daily.    carvedilol (COREG) 6.25 MG tablet   No No    Take 1 tablet by mouth 2 (Two) Times a Day.    clopidogrel (PLAVIX) 75 MG tablet   No No    Take 1 tablet by mouth Daily.    furosemide (LASIX) 20 MG tablet   No No    Take 1 tablet by mouth As Needed (lower extremity swelling).    Patient taking differently:  Take 1 tablet by mouth Daily.    hydrALAZINE (APRESOLINE) 25 MG tablet   No No    TAKE 1 TABLET BY MOUTH TWICE A DAY    Patient taking differently:  Take 1 tablet by mouth 2 (Two) Times a Day.    isosorbide mononitrate (IMDUR) 30 MG 24 hr tablet   No No    Take 2 tablets by mouth Daily.    losartan  (COZAAR) 100 MG tablet   No No    TAKE 1 TABLET BY MOUTH EVERY DAY    Patient taking differently:  Take 1 tablet by mouth Daily.    metFORMIN (GLUCOPHAGE) 1000 MG tablet   Yes No    Take 0.5 tablets by mouth 2 (Two) Times a Day With Meals.              Allergies:  No Known Allergies    Objective      Vitals:   Temp:  [98.2 °F (36.8 °C)] 98.2 °F (36.8 °C)  Heart Rate:  [80-96] 80  Resp:  [18] 18  BP: (108-191)/(55-84) 116/69    Physical Exam  Constitutional:       Appearance: He is obese.   Eyes:      Pupils: Pupils are equal, round, and reactive to light.   Cardiovascular:      Rate and Rhythm: Normal rate and regular rhythm.   Pulmonary:      Effort: Pulmonary effort is normal.      Breath sounds: Normal breath sounds.   Abdominal:      Comments: Obese abdomen.  Soft   Musculoskeletal:      Comments: Edema right lower extremity greater than left   Skin:     General: Skin is warm and dry.   Neurological:      Mental Status: He is alert and oriented to person, place, and time.   Psychiatric:         Mood and Affect: Mood normal.         Behavior: Behavior normal.            Result Review   Result Review:  I have personally reviewed the results from the time of this admission to 10/25/2023 03:52 EDT and agree with these findings:  [x]  Laboratory  [x]  Microbiology  [x]  Radiology  []  EKG/Telemetry   []  Cardiology/Vascular   []  Pathology  [x]  Old records  []  Other:  Most notable findings include:   XR Chest 1 View    Result Date: 10/24/2023  Impression: No active disease Electronically Signed: Ezequiel Hedrick MD  10/24/2023 11:30 PM EDT  Workstation ID: [id number]    ECG 12 Lead Chest Pain   Preliminary Result   HEART RATE= 98  bpm   RR Interval= 612  ms   MS Interval= 188  ms   P Horizontal Axis= 9  deg   P Front Axis= 67  deg   QRSD Interval= 93  ms   QT Interval= 340  ms   QTcB= 435  ms   QRS Axis= 0  deg   T Wave Axis= 128  deg   - ABNORMAL ECG -   Sinus rhythm   Indeterminate axis   Probable lateral infarct, old    Abnrm T, probable ischemia, anterolateral lds   When compared with ECG of 24-Oct-2023 22:56:28,   No significant change   Electronically Signed By:    Date and Time of Study: 2023-10-25 01:22:00      ECG 12 Lead Chest Pain   Preliminary Result   HEART RATE= 85  bpm   RR Interval= 708  ms   NH Interval= 159  ms   P Horizontal Axis= -22  deg   P Front Axis= 44  deg   QRSD Interval= 99  ms   QT Interval= 348  ms   QTcB= 414  ms   QRS Axis= -3  deg   T Wave Axis= 108  deg   - ABNORMAL ECG -   Sinus rhythm   Consider anterior infarct   Repol abnrm suggests ischemia, lateral leads   Electronically Signed By:    Date and Time of Study: 2023-10-24 22:56:28      ECG 12 Lead Chest Pain    (Results Pending)      CBC          10/12/2023    00:10 10/24/2023    09:51 10/24/2023    23:03 10/25/2023    01:59   CBC   WBC 8.70  6.30  8.90  8.90    RBC 4.74  4.46  4.70  4.45    Hemoglobin 14.7  13.8  14.3  13.8    Hematocrit 43.4  41.8  43.4  41.1    MCV 91.5  93.7  92.4  92.5    MCH 31.0  30.9  30.4  31.1    MCHC 33.8  32.9  32.9  33.7    RDW 13.7  13.3  13.5  13.6    Platelets 228  235  250  239       CMP          1/28/2023    03:59 10/12/2023    00:10 10/24/2023    09:51 10/24/2023    23:03   CMP   Glucose 151  105  130  205    BUN 18  26  21  20    Creatinine 1.22  1.55  1.66  1.69    EGFR 64.6  48.5  44.6  43.7    Sodium 140  140  139  140    Potassium 4.0  4.6  4.6  4.1    Chloride 105  103  103  100    Calcium 9.4  9.8  9.8  10.0    Total Protein  7.9   7.6    Albumin  4.7   4.9    Globulin  3.2   2.7    Total Bilirubin  0.3   0.3    Alkaline Phosphatase  95   93    AST (SGOT)  14   7    ALT (SGPT)  14   9    Albumin/Globulin Ratio  1.5   1.8    BUN/Creatinine Ratio 14.8  16.8  12.7  11.8    Anion Gap 10.0  10.0  9.3  12.0           Assessment & Plan        Active Hospital Problems:  Active Hospital Problems    Diagnosis     **NSTEMI (non-ST elevated myocardial infarction)      Plan:   NSTEMI  -Opponent trended from 15-98  -  hx CAD s/p CABG and PCI   -Chest pain has now resolved on nitroglycerin drip  -Patient was evaluated by cardiology and plan for cardiac cath today  -Continue heparin and Integrilin  -Cardiology also recommended continue aspirin and Plavix  -Monitor for any sign of bleed    Hypertension  -Blood pressure stable  -We will hold blood pressure medications for now    Acute kidney injury on chronic kidney disease  - Gentle hydration  - consult nephrology   - avoid nephrotoxic medication    Hyperlipidemia  -We will increase statin from 20 mg to 80 mg  -Check lipid profile    Diabetes  -Hold metformin  -A1c 6.0 23  - SS prn     LIZETTE  - O2 prn     Tobacco dependency  - pt denies need for nicotine patch  - tobacco cessation education    Obesity  - BMI 40.87   - recommend diet and lifestyle modification geared at weight loss       DVT prophylaxis:  Medical DVT prophylaxis orders are present.    CODE STATUS:       Admission Status:  I believe this patient meets inpatient status.    I discussed the patient's findings and my recommendations with patient and family.    This patient has been examined wearing appropriate Personal Protective Equipment   Signature: Electronically signed by ARIK Lam, 10/25/23, 03:52 EDT.  [facility] Hospitalist Team     Electronically signed by Sandee Aryaa DO at 10/25/23 0539       Emergency Department Notes    No notes of this type exist for this encounter.       Operative/Procedure Notes (last 24 hours)  Notes from 10/24/23 1242 through 10/25/23 1242   No notes of this type exist for this encounter.          Physician Progress Notes (last 24 hours)        Teddy Harrison MD at 10/25/23 1206           [facility]     Progress Note    Patient Name: Jamil Self  : 1955  MRN: [id number]  Primary Care Physician:  Desiree Almaguer APRN  Date of admission: 10/24/2023  Service date and time: 10/25/23 12:06 EDT  Subjective   Subjective     Chief Complaint: chest  pain    HPI:  Patient still complaining of mild chest pain.      Objective   Objective     Vitals:   Temp:  [97.8 °F (36.6 °C)-98.2 °F (36.8 °C)] 97.8 °F (36.6 °C)  Heart Rate:  [76-98] 89  Resp:  [12-20] 20  BP: (108-208)/() 136/89  Flow (L/min):  [2-4] 4  Physical Exam    Constitutional: Awake, alert oriented in no distress at time of examination.   Eyes: PERRLA, sclerae anicteric, no conjunctival injection   HENT: NCAT, mucous membranes moist   Neck: Supple, no thyromegaly, no lymphadenopathy, trachea midline   Respiratory: Clear to auscultation bilaterally, nonlabored respirations    Cardiovascular: RRR, no murmurs, rubs, or gallops, palpable pedal pulses bilaterally   Gastrointestinal: Positive bowel sounds, soft, nontender, nondistended   Musculoskeletal: No bilateral ankle edema, no clubbing or cyanosis to extremities   Psychiatric: Appropriate affect, cooperative   Neurologic: Oriented x 3, strength symmetric in all extremities, Cranial Nerves grossly intact to confrontation, speech clear   Skin: No rashes     Result Review    Result Review:  I have personally reviewed the results from the time of this admission to 10/25/2023 12:06 EDT and agree with these findings:  [x]  Laboratory list / accordion  []  Microbiology  []  Radiology  []  EKG/Telemetry   []  Cardiology/Vascular   []  Pathology  []  Old records  []  Other:  Most notable findings include: Troponin T 274 total cholesterol 102 triglyceride 102 HDL 42, LDL 41, VLDL cholesterol 19, A1c level 6.2      Assessment & Plan   Assessment / Plan       Active Hospital Problems:  Active Hospital Problems    Diagnosis     **NSTEMI (non-ST elevated myocardial infarction)     Unstable angina      Plan:    NSTEMI  -Opponent trended from 15-98  - hx CAD s/p CABG and PCI   -Chest pain has now resolved on nitroglycerin drip  -Patient was evaluated by cardiology and plan for cardiac cath today  -Continue heparin and Integrilin  -Cardiology also recommended  continue aspirin and Plavix  -Monitor for any sign of bleed  -Status post cardiac cath   - 1.  NSTEMI, chest pain unstable angina culprit distal left main compromising flow to a unbypassed circumflex distribution, also ostial LAD compromising septal and proximal diagonal branch status post intervention with 2.5 x 15 to the LAD postdilated 3.0 high-pressure with 2.5 x 23 2 the circumflex back to ostial left main given  of the LAD distally postdilated 3.0 distally at high pressure and 3.5 in the left main back to the ostium with flaring of the ostial portion of the stent at 22 justin with great angiographic results of the intervened upon segment.  2.  Residual small vessel disease in the second obtuse marginal branch proximally which can be staged at a later date if he continues to have chest pain or unstable angina  3.  Aspirin Brilinta, high intensity statin, afterload reduction for goal blood pressure less than 135 systolic, will admit to the hospital further optimizatio  Hypertension  -Blood pressure stable  -We will hold blood pressure medications for now     Acute kidney injury on chronic kidney disease  - Gentle hydration  - consult nephrology   - avoid nephrotoxic medication     Hyperlipidemia  -We will increase statin from 20 mg to 80 mg  -Check lipid profile     Diabetes  -Hold metformin  -A1c 6.0 1/28/23  - SS prn      LIZETTE  - O2 prn      Tobacco dependency  - pt denies need for nicotine patch  - tobacco cessation education     Obesity  - BMI 40.87   - recommend diet and lifestyle modification geared at weight loss        DVT prophylaxis:  No DVT prophylaxis order currently exists.    CODE STATUS:   Code Status (Patient has no pulse and is not breathing): CPR (Attempt to Resuscitate)  Medical Interventions (Patient has pulse or is breathing): Full Support    Disposition:  I expect patient to be discharged in 1 day.    Teddy Harrison MD    Electronically signed by Teddy Harrison MD at 10/25/23 2972           Consult Notes (last 24 hours)        Kane Kessler MD at 10/25/23 0944        Consult Orders    1. Inpatient Nephrology Consult [[id number]] ordered by Nanci Jeffers MD at 10/25/23 0718                 [facility] CONSULT NOTE    Referring Provider: Teddy Harrison MD   Reason for Consultation: Renal insufficiency    Chief complaint.  Chest pain    History of present illness: Patient is 67 years old male with history of chronic kidney disease, hypertension, coronary artery disease, CABG, sleep apnea, presented to the hospital with chest pain.  Patient took sublingual nitro at home without any improvement.  Patient was seen by cardiologist few days ago and was started on Lasix.  In ER patient was placed on nitro drip, heparin drip and Integrilin.  Patient is chest pain-free at this time.  Cardiology taking him for cardiac cath    History  Past Medical History:   Diagnosis Date    Coronary artery disease     Diabetes mellitus     Hyperlipidemia     Hypertension     LIZETTE (obstructive sleep apnea)      Past Surgical History:   Procedure Laterality Date    CARDIAC CATHETERIZATION N/A 2021    Procedure: Left Heart Cath;  Surgeon: Jourdan Pillai MD;  Location: [facility] CATH INVASIVE LOCATION;  Service: Cardiology;  Laterality: N/A;    CARDIAC CATHETERIZATION N/A 2023    Procedure: Left Heart Cath;  Surgeon: Dev Lawton MD;  Location: [facility] CATH INVASIVE LOCATION;  Service: Cardiology;  Laterality: N/A;    CAROTID STENT      CORONARY ARTERY BYPASS GRAFT      , by patient's guess    CORONARY STENT PLACEMENT       Social History     Tobacco Use    Smoking status: Former     Packs/day: 1.00     Years: 40.00     Additional pack years: 0.00     Total pack years: 40.00     Types: Cigarettes     Quit date: 10/20/2023     Years since quittin.0    Smokeless tobacco: Never   Vaping Use    Vaping Use: Never used   Substance Use Topics    Alcohol use: Never    Drug use:  "Never     Family History   Problem Relation Age of Onset    No Known Problems Mother     No Known Problems Father        Review of Systems  ROS    Objective     Vital Signs  Temp:  [97.8 °F (36.6 °C)-98.2 °F (36.8 °C)] 97.8 °F (36.6 °C)  Heart Rate:  [76-98] 84  Resp:  [12-20] 20  BP: (108-191)/() 187/98    No intake/output data recorded.  No intake/output data recorded.    Physical Exam:  Physical Exam    General Appearance: alert, oriented x 3, no acute distress   Skin: warm and dry  HEENT: oral mucosa normal, nonicteric sclera  Neck: supple, no JVD  Lungs: Scattered wheezes  Heart: RRR, normal S1 and S2  Abdomen: soft, nontender, nondistended  : no palpable bladder  Extremities: no edema, cyanosis or clubbing  Neuro: normal speech and mental status     Results Review:   I reviewed the patient's new clinical results.    Lab Results   Component Value Date    CALCIUM 10.0 10/24/2023     Results from last 7 days   Lab Units 10/25/23  0159 10/24/23  2303 10/24/23  0951   SODIUM mmol/L  --  140 139   POTASSIUM mmol/L  --  4.1 4.6   CHLORIDE mmol/L  --  100 103   CO2 mmol/L  --  28.0 26.7   BUN mg/dL  --  20 21   CREATININE mg/dL  --  1.69* 1.66*   GLUCOSE mg/dL  --  205* 130*   CALCIUM mg/dL  --  10.0 9.8   WBC 10*3/mm3 8.90 8.90 6.30   HEMOGLOBIN g/dL 13.8 14.3 13.8   PLATELETS 10*3/mm3 239 250 235   ALT (SGPT) U/L  --  9  --    AST (SGOT) U/L  --  7  --      Lab Results   Component Value Date    TROPONINT 274 (C) 10/25/2023     Estimated Creatinine Clearance: 59.8 mL/min (A) (by C-G formula based on SCr of 1.69 mg/dL (H)).No results found for: \"URICACID\"    Brief Urine Lab Results  (Last result in the past 365 days)        Color   Clarity   Blood   Leuk Est   Nitrite   Protein   CREAT   Urine HCG        10/12/23 0134 Yellow   Clear   Negative   Negative   Negative   30 mg/dL (1+)                   Prior to Admission medications    Medication Sig Start Date End Date Taking? Authorizing Provider   amLODIPine " (NORVASC) 10 MG tablet Take 1 tablet by mouth Daily. 9/7/22  Yes Toni Goyal MD   aspirin 81 MG EC tablet Take 1 tablet by mouth Daily. 12/25/21  Yes Maureen Mosqueda MD   atorvastatin (LIPITOR) 20 MG tablet Take 1 tablet by mouth Daily.   Yes ProviderToni MD   carvedilol (COREG) 6.25 MG tablet Take 1 tablet by mouth 2 (Two) Times a Day. 7/13/23  Yes Dev Lawton MD   clopidogrel (PLAVIX) 75 MG tablet Take 1 tablet by mouth Daily. 9/12/22  Yes Dev Lawton MD   furosemide (LASIX) 20 MG tablet Take 1 tablet by mouth Daily.   Yes ProviderToni MD   hydrALAZINE (APRESOLINE) 25 MG tablet Take 1 tablet by mouth 2 (Two) Times a Day.   Yes ProviderToni MD   isosorbide mononitrate (IMDUR) 30 MG 24 hr tablet Take 2 tablets by mouth Daily. 10/20/23  Yes Herminia Olmos APRN   losartan (COZAAR) 100 MG tablet Take 1 tablet by mouth Daily.   Yes ProviderToni MD   metFORMIN (GLUCOPHAGE) 1000 MG tablet Take 0.5 tablets by mouth 2 (Two) Times a Day With Meals.   Yes ProviderToni MD   nebivolol (BYSTOLIC) 10 MG tablet Take 1 tablet by mouth Daily.  10/25/23 Yes Toni Goyal MD   furosemide (LASIX) 20 MG tablet Take 1 tablet by mouth As Needed (lower extremity swelling).  Patient taking differently: Take 1 tablet by mouth Daily. 10/20/23 10/25/23  Herminia Olmos APRN   hydrALAZINE (APRESOLINE) 25 MG tablet TAKE 1 TABLET BY MOUTH TWICE A DAY  Patient taking differently: Take 1 tablet by mouth 2 (Two) Times a Day. 10/5/23 10/25/23  Herminia Olmos APRN   losartan (COZAAR) 100 MG tablet TAKE 1 TABLET BY MOUTH EVERY DAY  Patient taking differently: Take 1 tablet by mouth Daily. 9/14/23 10/25/23  Dev Lawton MD       [MAR Hold] atorvastatin, 80 mg, Oral, Nightly  [MAR Hold] clopidogrel, 75 mg, Oral, Daily  [MAR Hold] insulin lispro, 2-7 Units, Subcutaneous, Q6H  [MAR Hold] senna-docusate sodium, 2 tablet, Oral, BID      eptifibatide, 15 mg/hr,  Last Rate: Stopped (10/25/23 0939)  heparin, 7.3 Units/kg/hr, Last Rate: 10.3 Units/kg/hr (10/25/23 0631)  nitroglycerin, 10-50 mcg/min, Last Rate: 40 mcg/min (10/25/23 0712)  sodium chloride, 75 mL/hr, Last Rate: 75 mL/hr (10/25/23 0801)        Assessment & Plan       Chronic kidney disease stage IIIa.  Patient has underlying CKD due to hypertensive, diabetes and some azotemia related to ARB and diuretics.  Baseline creatinine seems to be around 1.3-1.4 Mg/DL.  Creatinine slightly higher than baseline.  Electrolytes okay  NSTEMI.  Patient admitted with chest pain and was started on nitro, heparin drip and Integrilin.  Patient going for cardiac cath soon  Hypertension with chronic kidney disease.  Blood pressure running high this morning    Plan:  Patient going for cardiac cath.  Explained the risk of contrast-induced nephropathy patient in detail he sounded understanding  Gentle IV hydration  Ordered urinalysis, urine protein/creatinine  Restart antihypertensives gradually  I will follow along    I discussed the patients findings and my recommendations with patient and nursing staff    Kane Kessler MD  10/25/23  09:44 EDT            Electronically signed by Kane Kessler MD at 10/25/23 0953       Sade Khan MD at 10/25/23 0338            Referring Provider: Sandee Araya DO    Reason for Consultation:  Chest pain      Patient Care Team:  Desiree Almaguer APRN as PCP - General MohanahJourdan israel MD as Consulting Physician (Cardiology)      SUBJECTIVE     Chief Complaint:Chest pain    History of present illness:  Jamil Self is a 67 y.o. male with a history of coronary artery disease status post CABG with LIMA to LAD, SVG to RCA, SVG to OM, status post PCI of the SVG to RCA and PCI of the distal LAD in January 2023, hypertension, hyperlipidemia, diabetes, obesity, tobacco abuse who presented to [facility] with complaint of chest pain.  Of note he was recently seen in his primary cardiologist  office for similar complaints of chest pain and he was set up for cardiac catheterization outpatient.  He said he was sitting and watching a baseball game tonight when he had sudden onset of chest pain in the left lower chest.  He said it was stabbing in nature.  This is different from his previous pain which he had when he has had PCI's in the past which is usually a heaviness in his chest when he tries to exert himself.  The pain that he has had currently has been more so a sharp pain which sometimes occurs at rest and sometimes with exertion.  He mentions that he was here earlier to get lab work done for his procedure and he was able to walk around in the parking lot without any difficulty.  However given the severity of pain he came to the ER.  At that time he rated as an 8-9 out of 10.    In the ER his initial troponin was 15.  proBNP was 149.  His creatinine is 1.69.    EKG showed sinus rhythm with a rate of 98 bpm with old inferior infarct and T wave inversions in the anterolateral leads.    He was initially started on heparin and nitroglycerin but he had persistent chest pain.  The nitroglycerin was titrated up.  I was asked to emergently evaluate the patient given his ongoing chest pain.  He was also started on Integrilin.  When I saw the patient his chest pain had completely subsided.  He rated as a 0 out of 10 now.  He says all the medications that he got have helped his chest pain.  Blood pressure is currently 120 systolic and heart rate is stable.    Review of systems:    Constitutional: No weakness, fatigue, fever, rigors, chills   Eyes: No vision changes, eye pain   ENT/oropharynx: No difficulty swallowing, sore throat, epistaxis, changes in hearing   Cardiovascular: +chest pain, chest tightness, palpitations, paroxysmal nocturnal dyspnea, orthopnea, diaphoresis, dizziness / syncopal episode   Respiratory: No shortness of breath, dyspnea on exertion, cough, wheezing, hemoptysis   Gastrointestinal: No  abdominal pain, nausea, vomiting, diarrhea, bloody stools   Genitourinary: No hematuria, dysuria   Neurological: No headache, tremors, numbness, one-sided weakness    Musculoskeletal: No cramps, myalgias, joint pain, joint swelling   Integument: No rash, edema        Personal History:      Past Medical History:   Diagnosis Date    Coronary artery disease     Diabetes mellitus     Hyperlipidemia     Hypertension     LIZETTE (obstructive sleep apnea)        Past Surgical History:   Procedure Laterality Date    CARDIAC CATHETERIZATION N/A 2021    Procedure: Left Heart Cath;  Surgeon: Jourdan Pillai MD;  Location: [facility] CATH INVASIVE LOCATION;  Service: Cardiology;  Laterality: N/A;    CARDIAC CATHETERIZATION N/A 2023    Procedure: Left Heart Cath;  Surgeon: Dev Lawton MD;  Location: [facility] CATH INVASIVE LOCATION;  Service: Cardiology;  Laterality: N/A;    CAROTID STENT      CORONARY ARTERY BYPASS GRAFT      , by patient's guess    CORONARY STENT PLACEMENT         Family History   Problem Relation Age of Onset    No Known Problems Mother     No Known Problems Father        Social History     Tobacco Use    Smoking status: Former     Packs/day: 1.00     Years: 40.00     Additional pack years: 0.00     Total pack years: 40.00     Types: Cigarettes     Quit date: 10/20/2023     Years since quittin.0    Smokeless tobacco: Never   Vaping Use    Vaping Use: Never used   Substance Use Topics    Alcohol use: Never    Drug use: Never        Home meds:  Prior to Admission medications    Medication Sig Start Date End Date Taking? Authorizing Provider   amLODIPine (NORVASC) 10 MG tablet Take 1 tablet by mouth Daily. 22   Toni Goyal MD   aspirin 81 MG EC tablet Take 1 tablet by mouth Daily. 21   Maureen Mosqueda MD   atorvastatin (LIPITOR) 20 MG tablet Take 1 tablet by mouth Daily.    Toni Goyal MD   carvedilol (COREG) 6.25 MG tablet Take 1 tablet by mouth 2  "(Two) Times a Day. 7/13/23   Dev Lawton MD   clopidogrel (PLAVIX) 75 MG tablet Take 1 tablet by mouth Daily. 9/12/22   Dev Lawton MD   furosemide (LASIX) 20 MG tablet Take 1 tablet by mouth As Needed (lower extremity swelling).  Patient taking differently: Take 1 tablet by mouth Daily. 10/20/23   Herminia Olmos APRN   hydrALAZINE (APRESOLINE) 25 MG tablet TAKE 1 TABLET BY MOUTH TWICE A DAY  Patient taking differently: Take 1 tablet by mouth 2 (Two) Times a Day. 10/5/23   Herminia Olmos APRN   isosorbide mononitrate (IMDUR) 30 MG 24 hr tablet Take 2 tablets by mouth Daily. 10/20/23   Herminia Olmos APRN   losartan (COZAAR) 100 MG tablet TAKE 1 TABLET BY MOUTH EVERY DAY  Patient taking differently: Take 1 tablet by mouth Daily. 9/14/23   Dev Lawton MD   metFORMIN (GLUCOPHAGE) 1000 MG tablet Take 0.5 tablets by mouth 2 (Two) Times a Day With Meals.    Provider, MD Toni       Allergies:     Patient has no known allergies.    Scheduled Meds:   Continuous Infusions:eptifibatide, 15 mg/hr, Last Rate: 15 mg/hr (10/25/23 0157)  heparin, 7.3 Units/kg/hr, Last Rate: 7.3 Units/kg/hr (10/25/23 0155)  nitroglycerin, 10-50 mcg/min, Last Rate: 40 mcg/min (10/25/23 0127)      PRN Meds:  heparin    heparin      OBJECTIVE    Vital Signs  Vitals:    10/25/23 0211 10/25/23 0216 10/25/23 0223 10/25/23 0232   BP: 131/62 108/61  116/69   Pulse: 83 83 89 80   Resp:       Temp:       SpO2: 91% (!) 89% 91% 95%   Weight:       Height:           Flowsheet Rows      Flowsheet Row First Filed Value   Admission Height 182.9 cm (72\") Documented at 10/24/2023 2252   Admission Weight 137 kg (301 lb 5.9 oz) Documented at 10/24/2023 2252            No intake or output data in the 24 hours ending 10/25/23 0338     Telemetry: Sinus rhythm    Physical Exam:  The patient is alert, oriented and in no distress.  Obese  Vital signs as noted above.  Head and neck revealed no carotid bruits or jugular venous " distention.  No thyromegaly or lymphadenopathy is present  Lungs clear.  No wheezing.  Breath sounds are normal bilaterally.  Heart: Normal first and second heart sounds. No murmur.  No precordial rub is present.  No gallop is present.  Abdomen: Soft and nontender.  No organomegaly is present.  Extremities with good peripheral pulses without any pedal edema.  Skin: Warm and dry.  Musculoskeletal system is grossly normal.  CNS grossly normal.       Results Review:  I have personally reviewed the results from the time of this admission to 10/25/2023 03:38 EDT and agree with these findings:  []  Laboratory  []  Microbiology  []  Radiology  []  EKG/Telemetry   []  Cardiology/Vascular   []  Pathology  []  Old records  []  Other:    Most notable findings include:     Lab Results (last 24 hours)       Procedure Component Value Units Date/Time    High Sensitivity Troponin T [[id number]]  (Abnormal) Collected: 10/25/23 0159    Specimen: Blood Updated: 10/25/23 0229     HS Troponin T 98 ng/L     Narrative:      High Sensitive Troponin T Reference Range:  <10.0 ng/L- Negative Female for AMI  <15.0 ng/L- Negative Male for AMI  >=10 - Abnormal Female indicating possible myocardial injury.  >=15 - Abnormal Male indicating possible myocardial injury.   Clinicians would have to utilize clinical acumen, EKG, Troponin, and serial changes to determine if it is an Acute Myocardial Infarction or myocardial injury due to an underlying chronic condition.         CBC (No Diff) [[id number]]  (Normal) Collected: 10/25/23 0159    Specimen: Blood Updated: 10/25/23 0209     WBC 8.90 10*3/mm3      RBC 4.45 10*6/mm3      Hemoglobin 13.8 g/dL      Hematocrit 41.1 %      MCV 92.5 fL      MCH 31.1 pg      MCHC 33.7 g/dL      RDW 13.6 %      RDW-SD 43.8 fl      MPV 9.3 fL      Platelets 239 10*3/mm3     [place] Draw [[id number]] Collected: 10/24/23 2303    Specimen: Blood Updated: 10/25/23 0015    Narrative:      The following orders were created for  panel order [place] Draw.  Procedure                               Abnormality         Status                     ---------                               -----------         ------                     Green Top (Gel)[[id number]]                                  Final result               Lavender Top[[id number]]                                     Final result               Gold Top - SST[[id number]]                                   Final result               Light Blue Top[[id number]]                                   Final result                 Please view results for these tests on the individual orders.    Lavender Top [[id number]] Collected: 10/24/23 2303    Specimen: Blood Updated: 10/25/23 0015     Extra Tube hold for add-on     Comment: Auto resulted       Gold Top - SST [[id number]] Collected: 10/24/23 2303    Specimen: Blood Updated: 10/25/23 0015     Extra Tube Hold for add-ons.     Comment: Auto resulted.       Light Blue Top [[id number]] Collected: 10/24/23 2303    Specimen: Blood Updated: 10/25/23 0015     Extra Tube Hold for add-ons.     Comment: Auto resulted       Green Top (Gel) [[id number]] Collected: 10/24/23 2303    Specimen: Blood Updated: 10/24/23 2342    Protime-INR [[id number]]  (Abnormal) Collected: 10/24/23 2303    Specimen: Blood Updated: 10/24/23 2341     Protime 9.9 Seconds      INR <0.93    aPTT [[id number]]  (Abnormal) Collected: 10/24/23 2303    Specimen: Blood Updated: 10/24/23 2341     PTT 26.3 seconds     Comprehensive Metabolic Panel [[id number]]  (Abnormal) Collected: 10/24/23 2303    Specimen: Blood Updated: 10/24/23 2329     Glucose 205 mg/dL      BUN 20 mg/dL      Creatinine 1.69 mg/dL      Sodium 140 mmol/L      Potassium 4.1 mmol/L      Chloride 100 mmol/L      CO2 28.0 mmol/L      Calcium 10.0 mg/dL      Total Protein 7.6 g/dL      Albumin 4.9 g/dL      ALT (SGPT) 9 U/L      AST (SGOT) 7 U/L      Alkaline Phosphatase 93 U/L      Total Bilirubin 0.3 mg/dL      Globulin 2.7 gm/dL       A/G Ratio 1.8 g/dL      BUN/Creatinine Ratio 11.8     Anion Gap 12.0 mmol/L      eGFR 43.7 mL/min/1.73     Narrative:      GFR Normal >60  Chronic Kidney Disease <60  Kidney Failure <15      Single High Sensitivity Troponin T [[id number]]  (Abnormal) Collected: 10/24/23 2303    Specimen: Blood Updated: 10/24/23 2329     HS Troponin T 15 ng/L     Narrative:      High Sensitive Troponin T Reference Range:  <10.0 ng/L- Negative Female for AMI  <15.0 ng/L- Negative Male for AMI  >=10 - Abnormal Female indicating possible myocardial injury.  >=15 - Abnormal Male indicating possible myocardial injury.   Clinicians would have to utilize clinical acumen, EKG, Troponin, and serial changes to determine if it is an Acute Myocardial Infarction or myocardial injury due to an underlying chronic condition.         BNP [[id number]]  (Normal) Collected: 10/24/23 2303    Specimen: Blood Updated: 10/24/23 2329     proBNP 149.7 pg/mL     Narrative:      This assay is used as an aid in the diagnosis of individuals suspected of having heart failure. It can be used as an aid in the diagnosis of acute decompensated heart failure (ADHF) in patients presenting with signs and symptoms of ADHF to the emergency department (ED). In addition, NT-proBNP of <300 pg/mL indicates ADHF is not likely.    Age Range Result Interpretation  NT-proBNP Concentration (pg/mL:      <50             Positive            >450                   Gray                 300-450                    Negative             <300    50-75           Positive            >900                  Gray                300-900                  Negative            <300      >75             Positive            >1800                  Gray                300-1800                  Negative            <300    CBC & Differential [[id number]]  (Abnormal) Collected: 10/24/23 2303    Specimen: Blood Updated: 10/24/23 2318    Narrative:      The following orders were created for panel order CBC &  Differential.  Procedure                               Abnormality         Status                     ---------                               -----------         ------                     CBC Auto Differential[[id number]]        Abnormal            Final result                 Please view results for these tests on the individual orders.    CBC Auto Differential [[id number]]  (Abnormal) Collected: 10/24/23 2303    Specimen: Blood Updated: 10/24/23 2318     WBC 8.90 10*3/mm3      RBC 4.70 10*6/mm3      Hemoglobin 14.3 g/dL      Hematocrit 43.4 %      MCV 92.4 fL      MCH 30.4 pg      MCHC 32.9 g/dL      RDW 13.5 %      RDW-SD 43.3 fl      MPV 9.1 fL      Platelets 250 10*3/mm3      Neutrophil % 70.9 %      Lymphocyte % 18.1 %      Monocyte % 6.9 %      Eosinophil % 2.9 %      Basophil % 1.2 %      Neutrophils, Absolute 6.30 10*3/mm3      Lymphocytes, Absolute 1.60 10*3/mm3      Monocytes, Absolute 0.60 10*3/mm3      Eosinophils, Absolute 0.30 10*3/mm3      Basophils, Absolute 0.10 10*3/mm3      nRBC 0.0 /100 WBC             Imaging Results (Last 24 Hours)       Procedure Component Value Units Date/Time    XR Chest 1 View [[id number]] Collected: 10/24/23 2329     Updated: 10/24/23 2332    Narrative:      XR CHEST 1 VW    Date of Exam: 10/24/2023 11:15 PM EDT    Indication: chest pain    Comparison: Chest radiograph dated October 12, 2023    Findings:  The heart is enlarged with changes of midline sternotomy and coronary artery bypass grafting. The pulmonary vascular markings are normal. The lungs are clear.      Impression:      Impression:  No active disease      Electronically Signed: Ezequiel Hedrick MD    10/24/2023 11:30 PM EDT    Workstation ID: [id number]            LAB RESULTS (LAST 7 DAYS)    CBC  Results from last 7 days   Lab Units 10/25/23  0159 10/24/23  2303 10/24/23  0951   WBC 10*3/mm3 8.90 8.90 6.30   RBC 10*6/mm3 4.45 4.70 4.46   HEMOGLOBIN g/dL 13.8 14.3 13.8   HEMATOCRIT % 41.1 43.4 41.8   MCV fL 92.5 92.4  93.7   PLATELETS 10*3/mm3 239 250 235       BMP  Results from last 7 days   Lab Units 10/24/23  2303 10/24/23  0951   SODIUM mmol/L 140 139   POTASSIUM mmol/L 4.1 4.6   CHLORIDE mmol/L 100 103   CO2 mmol/L 28.0 26.7   BUN mg/dL 20 21   CREATININE mg/dL 1.69* 1.66*   GLUCOSE mg/dL 205* 130*       CMP   Results from last 7 days   Lab Units 10/24/23  2303 10/24/23  0951   SODIUM mmol/L 140 139   POTASSIUM mmol/L 4.1 4.6   CHLORIDE mmol/L 100 103   CO2 mmol/L 28.0 26.7   BUN mg/dL 20 21   CREATININE mg/dL 1.69* 1.66*   GLUCOSE mg/dL 205* 130*   ALBUMIN g/dL 4.9  --    BILIRUBIN mg/dL 0.3  --    ALK PHOS U/L 93  --    AST (SGOT) U/L 7  --    ALT (SGPT) U/L 9  --        BNP        TROPONIN  Results from last 7 days   Lab Units 10/25/23  0159   HSTROP T ng/L 98*       CoAg  Results from last 7 days   Lab Units 10/24/23  2303 10/24/23  0951   INR  <0.93* <0.93*   APTT seconds 26.3*  --        Creatinine Clearance  Estimated Creatinine Clearance: 59.8 mL/min (A) (by C-G formula based on SCr of 1.69 mg/dL (H)).    ABG          Radiology  XR Chest 1 View    Result Date: 10/24/2023  Impression: No active disease Electronically Signed: Ezequiel Hedrick MD  10/24/2023 11:30 PM EDT  Workstation ID: [id number]       EKG  I personally viewed and interpreted the patient's EKG/Telemetry data:  ECG 12 Lead Chest Pain   Preliminary Result   HEART RATE= 98  bpm   RR Interval= 612  ms   MI Interval= 188  ms   P Horizontal Axis= 9  deg   P Front Axis= 67  deg   QRSD Interval= 93  ms   QT Interval= 340  ms   QTcB= 435  ms   QRS Axis= 0  deg   T Wave Axis= 128  deg   - ABNORMAL ECG -   Sinus rhythm   Indeterminate axis   Probable lateral infarct, old   Abnrm T, probable ischemia, anterolateral lds   When compared with ECG of 24-Oct-2023 22:56:28,   No significant change   Electronically Signed By:    Date and Time of Study: 2023-10-25 01:22:00      ECG 12 Lead Chest Pain   Preliminary Result   HEART RATE= 85  bpm   RR Interval= 708  ms   MI  Interval= 159  ms   P Horizontal Axis= -22  deg   P Front Axis= 44  deg   QRSD Interval= 99  ms   QT Interval= 348  ms   QTcB= 414  ms   QRS Axis= -3  deg   T Wave Axis= 108  deg   - ABNORMAL ECG -   Sinus rhythm   Consider anterior infarct   Repol abnrm suggests ischemia, lateral leads   Electronically Signed By:    Date and Time of Study: 2023-10-24 22:56:28      ECG 12 Lead Chest Pain    (Results Pending)         Echocardiogram:          Stress Test:  Results for orders placed during the hospital encounter of 01/20/23    Stress Test With Myocardial Perfusion (1 Day)    Interpretation Summary    Left ventricular ejection fraction is normal (Calculated EF = 66%).    Myocardial perfusion imaging indicates a small-sized, mildly severe area of ischemia located in the anterior wall and septal wall.    Impressions are consistent with a high risk study.    This is Cardiolite imaging stress test with small amount of distal anterior wall and apical ischemia.  No myocardial infarction noted.  Left ventricular size and function was normal.    Findings consistent with an indeterminate ECG stress test.        Cardiac Catheterization:  Results for orders placed during the hospital encounter of 01/27/23    Cardiac Catheterization/Vascular Study    Narrative   Dev Lawton MD, PhD  [facility] cardiology  1- date of service    Procedure  A 1 left heart catheterization coronary angiography of native and bypass graft, LV gram in AMARAL position, pullback assessment transaortic valve gradient  2.  Percutaneous coronary intervention to the distal LAD via left internal mammary artery, Marquise 2.0 x 26 drug-eluting stent overlapped proximally 2.25 x 12 Xience drug-eluting stent, postdilated 2.5 distally and 2.5 proximally at 14 to 16 justin, angiographic results with less than 10% visual stenosis, RYLIE-3 flow pre and post, reduction stenosis from tandem 99% lesions to less than 10% residual      Indication  Abnormal  stress test, anteroapical ischemia, multivessel CAD    After informed consent patient is brought to the Cath Lab sterilely prepped and draped in usual fashion with exposure of the left groin for left common femoral arterial access via micropuncture modified Stonge technique with fluoroscopic guidance, 6 Bengali sheath was placed, 035 guidewire to the aortic valve followed by diagnostic JL 4 and JR4 catheters for selective left and right coronary angiography, JR4 was used across aortic valve followed by hand-injection for LV gram EDP assessment and pullback assessment of the transaortic valve gradient, multipurpose diagnostic catheter was used for SVG to RCA imaging and IMT catheter was used for LIMA to LAD imaging, there was obstructive disease in the distal LAD distal to the stenosis of a widely patent left internal mammary artery.  Proximal LAD was , decision made for intervention via LIMA, he was heparinized with 100 units/kg of heparin for ACT greater than 250.  He was already on background therapy of aspirin and Plavix.  Internal mammary guide was used engage the LIMA assisted by run-through wire and guide liner which was advanced to the midportion of the LIMA graft to avoid traumatic injury to the ostial proximal segment, initially a 1.5 x 20 NC balloon was used to predilate the lesion up to 20 justin followed by 2.0 x 20 NC balloon distal to proximal fashion followed by PCI with 2.0 x 26 Marquise drug-eluting stent deployed at 16 justin follow-up pullback of the stent balloon 2-3 struts given undersized proximal segment with further postdilatation at 18 to 20 justin with a stent balloon, angiography revealed a focal leading edge dissection therefore a second 2.25 x 12 Xience drug-eluting stent was overlapped and landed immediately distal to the LIMA anastomosis and a healthy portion of the LAD, was deployed at 12 to 14 justin followed by a 2.5 x 12 NC balloon for postdilatation at the midportion of the distal stent  avoiding the distal edge back to the proximal portion at 16 justin for 30 seconds per inflation with great angiographic improvement, reduction stenosis from 10 to 90% lesions to less than 10% residual stenosis, RYLIE-3 flow with much improved runoff around the apex, no extension proximally with great retrograde flow to the diagonal branch and left internal mammary artery was widely patent.  All catheters were removed.  Left Cath Lab chest pain-free hemodynamically electrically stable alert talking to staff neurologically grossly intact bilaterally.  6 [place]e Angio-Seal was used to close left common femoral arteriotomy with immediate hemostasis and Meints of distal pulses.    Findings  1.  Opening aortic pressure of 169/65 with a mean of 120  2.  Closing pressure unchanged  3.  LV pressure 120/0 with a EDP of 7  4.  Normal LV systolic function with inferior wall hypokinesis which is chronic, overall EF appeared 50 to 55%  6.  Normal transaortic valve gradient    Angiography  1.  Left main distal greater than 90% disease with ostial LAD and circumflex 90% stenosis which is unchanged angiographically from prior images reviewed  2.  Ostial circumflex 90 to 95%, mid circumflex diffusely 80% to 90% in the AV groove extending to 2 obtuse marginal branches which appear patent, there is diffuse disease with moderate calcification throughout the circumflex which appears again unchanged from prior images  3.  LAD proximally diffusely diseased at the ostium 95%, there is  in the midportion, there is a small diagonal branch diffusely diseased from the proximal portion as well as a for septal   4.  LIMA to LAD is widely patent, distal to the anastomosis the LAD has tandem 99% stenosis prior to wraparound apical portion there is retrograde flow to the proximal diagonal branch and the LAD proximally has no disease  5.  SVG to obtuse marginal branch , known from prior case  6.  SVG to RCA widely patent with proximal  stent in the SVG widely patent, SVG is widely patent with good anastomosis and retrograde flow with anastomosis in the PLV and retrograde flow back to the bifurcation with supply the PDA  7.  Native RCA diffusely diseased  in the midportion unchanged from prior    Conclusions recommendations  Abnormal stress, culprit distal LAD tandem 99% lesions distal to anastomosis, PCI overlapping stents 2.25 x 12 Xience overlapped distally with [place] 2.0 x 26 postdilated 2.5 mm reduction stenosis less than 10% residual, RYLIE-3 flow pre and post  Heavily diseased circumflex with heavy calcific disease, closed SVG to circumflex but still with RYLIE-3 flow to obtuse marginal branches, continue medical treatment as unchanged angiographically from prior films  Patent SVG to RCA with widely patent prior intervention proximal SVG graft with Xience drug-eluting stent      EF 50 to 55% with inferior wall motion abnormality which is chronic and old and unchanged, normal LV pressures LVEDP and transaortic valve gradient    Continue aspirin Plavix  High intensity statin therapy  Secondary to mention goals  Admit for observation  Adjust antihypertensives for goal blood pressure less than 130 systolic    Dev Lawton MD, PhD        Other:      ASSESSMENT & PLAN:    Principal Problem:    NSTEMI (non-ST elevated myocardial infarction)    NSTEMI  His troponins have trended up from 15-98  Continue heparin and Integrilin  Given that his chest pain has now resolved no indication for emergent Cath Lab  We will plan for cardiac catheterization with his primary cardiologist in the a.m.  Continue nitroglycerin drip  Continue aspirin and Plavix    Hypertension  Currently normotensive  Hold blood pressure meds for now    CKD  Gentle hydration in anticipation of cardiac catheterization    Hyperlipidemia  Start statin    Findings discussed with the patient's nurse    Sade Khan MD  10/25/23  03:38 EDT                Electronically signed by  Sade Khan MD at 10/25/23 7418

## 2023-10-25 NOTE — PLAN OF CARE
Problem: Adult Inpatient Plan of Care  Goal: Plan of Care Review  Outcome: Ongoing, Progressing  Flowsheets (Taken 10/25/2023 1524)  Progress: improving  Plan of Care Reviewed With: patient  Outcome Evaluation: Pt arrived from [facility] post heart cath. Pt currently on bedrest. BP has improved with nitro gtt. Will continue to wean as tolerated.  Goal: Patient-Specific Goal (Individualized)  Outcome: Ongoing, Progressing  Goal: Absence of Hospital-Acquired Illness or Injury  Outcome: Ongoing, Progressing  Intervention: Prevent and Manage VTE (Venous Thromboembolism) Risk  Recent Flowsheet Documentation  Taken 10/25/2023 1500 by Sylvia Caputo, RN  Activity Management: bedrest  Goal: Optimal Comfort and Wellbeing  Outcome: Ongoing, Progressing  Goal: Readiness for Transition of Care  Outcome: Ongoing, Progressing     Problem: Chest Pain  Goal: Resolution of Chest Pain Symptoms  Outcome: Ongoing, Progressing     Problem: Diabetes Comorbidity  Goal: Blood Glucose Level Within Targeted Range  Outcome: Ongoing, Progressing     Problem: Hypertension Comorbidity  Goal: Blood Pressure in Desired Range  Outcome: Ongoing, Progressing   Goal Outcome Evaluation:  Plan of Care Reviewed With: patient        Progress: improving  Outcome Evaluation: Pt arrived from [facility] post heart cath. Pt currently on bedrest. BP has improved with nitro gtt. Will continue to wean as tolerated.

## 2023-10-25 NOTE — CONSULTS
[facility] CONSULT NOTE    Referring Provider: Teddy Harrison MD   Reason for Consultation: Renal insufficiency    Chief complaint.  Chest pain    History of present illness: Patient is 67 years old male with history of chronic kidney disease, hypertension, coronary artery disease, CABG, sleep apnea, presented to the hospital with chest pain.  Patient took sublingual nitro at home without any improvement.  Patient was seen by cardiologist few days ago and was started on Lasix.  In ER patient was placed on nitro drip, heparin drip and Integrilin.  Patient is chest pain-free at this time.  Cardiology taking him for cardiac cath    History  Past Medical History:   Diagnosis Date    Coronary artery disease     Diabetes mellitus     Hyperlipidemia     Hypertension     LIZETTE (obstructive sleep apnea)      Past Surgical History:   Procedure Laterality Date    CARDIAC CATHETERIZATION N/A 2021    Procedure: Left Heart Cath;  Surgeon: Jourdan Pillai MD;  Location: [facility] CATH INVASIVE LOCATION;  Service: Cardiology;  Laterality: N/A;    CARDIAC CATHETERIZATION N/A 2023    Procedure: Left Heart Cath;  Surgeon: Dev Lawton MD;  Location: [facility] CATH INVASIVE LOCATION;  Service: Cardiology;  Laterality: N/A;    CAROTID STENT      CORONARY ARTERY BYPASS GRAFT      , by patient's guess    CORONARY STENT PLACEMENT       Social History     Tobacco Use    Smoking status: Former     Packs/day: 1.00     Years: 40.00     Additional pack years: 0.00     Total pack years: 40.00     Types: Cigarettes     Quit date: 10/20/2023     Years since quittin.0    Smokeless tobacco: Never   Vaping Use    Vaping Use: Never used   Substance Use Topics    Alcohol use: Never    Drug use: Never     Family History   Problem Relation Age of Onset    No Known Problems Mother     No Known Problems Father        Review of Systems  ROS    Objective     Vital Signs  Temp:  [97.8 °F (36.6 °C)-98.2 °F (36.8 °C)]  "97.8 °F (36.6 °C)  Heart Rate:  [76-98] 84  Resp:  [12-20] 20  BP: (108-191)/() 187/98    No intake/output data recorded.  No intake/output data recorded.    Physical Exam:  Physical Exam    General Appearance: alert, oriented x 3, no acute distress   Skin: warm and dry  HEENT: oral mucosa normal, nonicteric sclera  Neck: supple, no JVD  Lungs: Scattered wheezes  Heart: RRR, normal S1 and S2  Abdomen: soft, nontender, nondistended  : no palpable bladder  Extremities: no edema, cyanosis or clubbing  Neuro: normal speech and mental status     Results Review:   I reviewed the patient's new clinical results.    Lab Results   Component Value Date    CALCIUM 10.0 10/24/2023     Results from last 7 days   Lab Units 10/25/23  0159 10/24/23  2303 10/24/23  0951   SODIUM mmol/L  --  140 139   POTASSIUM mmol/L  --  4.1 4.6   CHLORIDE mmol/L  --  100 103   CO2 mmol/L  --  28.0 26.7   BUN mg/dL  --  20 21   CREATININE mg/dL  --  1.69* 1.66*   GLUCOSE mg/dL  --  205* 130*   CALCIUM mg/dL  --  10.0 9.8   WBC 10*3/mm3 8.90 8.90 6.30   HEMOGLOBIN g/dL 13.8 14.3 13.8   PLATELETS 10*3/mm3 239 250 235   ALT (SGPT) U/L  --  9  --    AST (SGOT) U/L  --  7  --      Lab Results   Component Value Date    TROPONINT 274 (C) 10/25/2023     Estimated Creatinine Clearance: 59.8 mL/min (A) (by C-G formula based on SCr of 1.69 mg/dL (H)).No results found for: \"URICACID\"    Brief Urine Lab Results  (Last result in the past 365 days)        Color   Clarity   Blood   Leuk Est   Nitrite   Protein   CREAT   Urine HCG        10/12/23 0134 Yellow   Clear   Negative   Negative   Negative   30 mg/dL (1+)                   Prior to Admission medications    Medication Sig Start Date End Date Taking? Authorizing Provider   amLODIPine (NORVASC) 10 MG tablet Take 1 tablet by mouth Daily. 9/7/22  Yes Provider, MD Toni   aspirin 81 MG EC tablet Take 1 tablet by mouth Daily. 12/25/21  Yes Maureen Mosqueda MD   atorvastatin (LIPITOR) 20 MG tablet Take " 1 tablet by mouth Daily.   Yes ProviderToni MD   carvedilol (COREG) 6.25 MG tablet Take 1 tablet by mouth 2 (Two) Times a Day. 7/13/23  Yes Dev Lawton MD   clopidogrel (PLAVIX) 75 MG tablet Take 1 tablet by mouth Daily. 9/12/22  Yes Dev Lawton MD   furosemide (LASIX) 20 MG tablet Take 1 tablet by mouth Daily.   Yes ProviderToni MD   hydrALAZINE (APRESOLINE) 25 MG tablet Take 1 tablet by mouth 2 (Two) Times a Day.   Yes ProviderToni MD   isosorbide mononitrate (IMDUR) 30 MG 24 hr tablet Take 2 tablets by mouth Daily. 10/20/23  Yes Herminia Olmos APRN   losartan (COZAAR) 100 MG tablet Take 1 tablet by mouth Daily.   Yes ProviderToni MD   metFORMIN (GLUCOPHAGE) 1000 MG tablet Take 0.5 tablets by mouth 2 (Two) Times a Day With Meals.   Yes Toni Goyal MD   nebivolol (BYSTOLIC) 10 MG tablet Take 1 tablet by mouth Daily.  10/25/23 Yes Toni Goyal MD   furosemide (LASIX) 20 MG tablet Take 1 tablet by mouth As Needed (lower extremity swelling).  Patient taking differently: Take 1 tablet by mouth Daily. 10/20/23 10/25/23  Herminia Olmos APRN   hydrALAZINE (APRESOLINE) 25 MG tablet TAKE 1 TABLET BY MOUTH TWICE A DAY  Patient taking differently: Take 1 tablet by mouth 2 (Two) Times a Day. 10/5/23 10/25/23  Herminia Olmos APRN   losartan (COZAAR) 100 MG tablet TAKE 1 TABLET BY MOUTH EVERY DAY  Patient taking differently: Take 1 tablet by mouth Daily. 9/14/23 10/25/23  Dev Lawton MD       [MAR Hold] atorvastatin, 80 mg, Oral, Nightly  [MAR Hold] clopidogrel, 75 mg, Oral, Daily  [MAR Hold] insulin lispro, 2-7 Units, Subcutaneous, Q6H  [MAR Hold] senna-docusate sodium, 2 tablet, Oral, BID      eptifibatide, 15 mg/hr, Last Rate: Stopped (10/25/23 0939)  heparin, 7.3 Units/kg/hr, Last Rate: 10.3 Units/kg/hr (10/25/23 0631)  nitroglycerin, 10-50 mcg/min, Last Rate: 40 mcg/min (10/25/23 0712)  sodium chloride, 75 mL/hr, Last Rate: 75 mL/hr  (10/25/23 0801)        Assessment & Plan       Chronic kidney disease stage IIIa.  Patient has underlying CKD due to hypertensive, diabetes and some azotemia related to ARB and diuretics.  Baseline creatinine seems to be around 1.3-1.4 Mg/DL.  Creatinine slightly higher than baseline.  Electrolytes okay  NSTEMI.  Patient admitted with chest pain and was started on nitro, heparin drip and Integrilin.  Patient going for cardiac cath soon  Hypertension with chronic kidney disease.  Blood pressure running high this morning    Plan:  Patient going for cardiac cath.  Explained the risk of contrast-induced nephropathy patient in detail he sounded understanding  Gentle IV hydration  Ordered urinalysis, urine protein/creatinine  Restart antihypertensives gradually  I will follow along    I discussed the patients findings and my recommendations with patient and nursing staff    Kane Kessler MD  10/25/23  09:44 EDT

## 2023-10-25 NOTE — CONSULTS
Referring Provider: Sandee Araya DO    Reason for Consultation:  Chest pain      Patient Care Team:  Desiree Almaguer APRN as PCP - General  Jourdan Pillai MD as Consulting Physician (Cardiology)      SUBJECTIVE     Chief Complaint:Chest pain    History of present illness:  Jamil Self is a 67 y.o. male with a history of coronary artery disease status post CABG with LIMA to LAD, SVG to RCA, SVG to OM, status post PCI of the SVG to RCA and PCI of the distal LAD in January 2023, hypertension, hyperlipidemia, diabetes, obesity, tobacco abuse who presented to [facility] with complaint of chest pain.  Of note he was recently seen in his primary cardiologist office for similar complaints of chest pain and he was set up for cardiac catheterization outpatient.  He said he was sitting and watching a baseball game tonight when he had sudden onset of chest pain in the left lower chest.  He said it was stabbing in nature.  This is different from his previous pain which he had when he has had PCI's in the past which is usually a heaviness in his chest when he tries to exert himself.  The pain that he has had currently has been more so a sharp pain which sometimes occurs at rest and sometimes with exertion.  He mentions that he was here earlier to get lab work done for his procedure and he was able to walk around in the parking lot without any difficulty.  However given the severity of pain he came to the ER.  At that time he rated as an 8-9 out of 10.    In the ER his initial troponin was 15.  proBNP was 149.  His creatinine is 1.69.    EKG showed sinus rhythm with a rate of 98 bpm with old inferior infarct and T wave inversions in the anterolateral leads.    He was initially started on heparin and nitroglycerin but he had persistent chest pain.  The nitroglycerin was titrated up.  I was asked to emergently evaluate the patient given his ongoing chest pain.  He was also started on Integrilin.   When I saw the patient his chest pain had completely subsided.  He rated as a 0 out of 10 now.  He says all the medications that he got have helped his chest pain.  Blood pressure is currently 120 systolic and heart rate is stable.    Review of systems:    Constitutional: No weakness, fatigue, fever, rigors, chills   Eyes: No vision changes, eye pain   ENT/oropharynx: No difficulty swallowing, sore throat, epistaxis, changes in hearing   Cardiovascular: +chest pain, chest tightness, palpitations, paroxysmal nocturnal dyspnea, orthopnea, diaphoresis, dizziness / syncopal episode   Respiratory: No shortness of breath, dyspnea on exertion, cough, wheezing, hemoptysis   Gastrointestinal: No abdominal pain, nausea, vomiting, diarrhea, bloody stools   Genitourinary: No hematuria, dysuria   Neurological: No headache, tremors, numbness, one-sided weakness    Musculoskeletal: No cramps, myalgias, joint pain, joint swelling   Integument: No rash, edema        Personal History:      Past Medical History:   Diagnosis Date    Coronary artery disease     Diabetes mellitus     Hyperlipidemia     Hypertension     LIZETTE (obstructive sleep apnea)        Past Surgical History:   Procedure Laterality Date    CARDIAC CATHETERIZATION N/A 03/21/2021    Procedure: Left Heart Cath;  Surgeon: Jourdan Pillai MD;  Location: [facility] CATH INVASIVE LOCATION;  Service: Cardiology;  Laterality: N/A;    CARDIAC CATHETERIZATION N/A 1/27/2023    Procedure: Left Heart Cath;  Surgeon: Dev Lawton MD;  Location: [facility] CATH INVASIVE LOCATION;  Service: Cardiology;  Laterality: N/A;    CAROTID STENT      CORONARY ARTERY BYPASS GRAFT      1990's, by patient's guess    CORONARY STENT PLACEMENT         Family History   Problem Relation Age of Onset    No Known Problems Mother     No Known Problems Father        Social History     Tobacco Use    Smoking status: Former     Packs/day: 1.00     Years: 40.00     Additional pack years: 0.00      Total pack years: 40.00     Types: Cigarettes     Quit date: 10/20/2023     Years since quittin.0    Smokeless tobacco: Never   Vaping Use    Vaping Use: Never used   Substance Use Topics    Alcohol use: Never    Drug use: Never        Home meds:  Prior to Admission medications    Medication Sig Start Date End Date Taking? Authorizing Provider   amLODIPine (NORVASC) 10 MG tablet Take 1 tablet by mouth Daily. 22   Toni Goyal MD   aspirin 81 MG EC tablet Take 1 tablet by mouth Daily. 21   Maureen Mosqueda MD   atorvastatin (LIPITOR) 20 MG tablet Take 1 tablet by mouth Daily.    ProviderToni MD   carvedilol (COREG) 6.25 MG tablet Take 1 tablet by mouth 2 (Two) Times a Day. 23   Dev Lawton MD   clopidogrel (PLAVIX) 75 MG tablet Take 1 tablet by mouth Daily. 22   Dev Lawton MD   furosemide (LASIX) 20 MG tablet Take 1 tablet by mouth As Needed (lower extremity swelling).  Patient taking differently: Take 1 tablet by mouth Daily. 10/20/23   Herminia Olmos APRN   hydrALAZINE (APRESOLINE) 25 MG tablet TAKE 1 TABLET BY MOUTH TWICE A DAY  Patient taking differently: Take 1 tablet by mouth 2 (Two) Times a Day. 10/5/23   Herminia Olmos APRN   isosorbide mononitrate (IMDUR) 30 MG 24 hr tablet Take 2 tablets by mouth Daily. 10/20/23   Herminia Olmos APRN   losartan (COZAAR) 100 MG tablet TAKE 1 TABLET BY MOUTH EVERY DAY  Patient taking differently: Take 1 tablet by mouth Daily. 23   Dev Lawton MD   metFORMIN (GLUCOPHAGE) 1000 MG tablet Take 0.5 tablets by mouth 2 (Two) Times a Day With Meals.    ProviderToni MD       Allergies:     Patient has no known allergies.    Scheduled Meds:   Continuous Infusions:eptifibatide, 15 mg/hr, Last Rate: 15 mg/hr (10/25/23 0157)  heparin, 7.3 Units/kg/hr, Last Rate: 7.3 Units/kg/hr (10/25/23 0155)  nitroglycerin, 10-50 mcg/min, Last Rate: 40 mcg/min (10/25/23 0127)      PRN Meds:  heparin     "heparin      OBJECTIVE    Vital Signs  Vitals:    10/25/23 0211 10/25/23 0216 10/25/23 0223 10/25/23 0232   BP: 131/62 108/61  116/69   Pulse: 83 83 89 80   Resp:       Temp:       SpO2: 91% (!) 89% 91% 95%   Weight:       Height:           Flowsheet Rows      Flowsheet Row First Filed Value   Admission Height 182.9 cm (72\") Documented at 10/24/2023 2252   Admission Weight 137 kg (301 lb 5.9 oz) Documented at 10/24/2023 2252            No intake or output data in the 24 hours ending 10/25/23 0338     Telemetry: Sinus rhythm    Physical Exam:  The patient is alert, oriented and in no distress.  Obese  Vital signs as noted above.  Head and neck revealed no carotid bruits or jugular venous distention.  No thyromegaly or lymphadenopathy is present  Lungs clear.  No wheezing.  Breath sounds are normal bilaterally.  Heart: Normal first and second heart sounds. No murmur.  No precordial rub is present.  No gallop is present.  Abdomen: Soft and nontender.  No organomegaly is present.  Extremities with good peripheral pulses without any pedal edema.  Skin: Warm and dry.  Musculoskeletal system is grossly normal.  CNS grossly normal.       Results Review:  I have personally reviewed the results from the time of this admission to 10/25/2023 03:38 EDT and agree with these findings:  []  Laboratory  []  Microbiology  []  Radiology  []  EKG/Telemetry   []  Cardiology/Vascular   []  Pathology  []  Old records  []  Other:    Most notable findings include:     Lab Results (last 24 hours)       Procedure Component Value Units Date/Time    High Sensitivity Troponin T [[id number]]  (Abnormal) Collected: 10/25/23 0159    Specimen: Blood Updated: 10/25/23 0229     HS Troponin T 98 ng/L     Narrative:      High Sensitive Troponin T Reference Range:  <10.0 ng/L- Negative Female for AMI  <15.0 ng/L- Negative Male for AMI  >=10 - Abnormal Female indicating possible myocardial injury.  >=15 - Abnormal Male indicating possible myocardial " injury.   Clinicians would have to utilize clinical acumen, EKG, Troponin, and serial changes to determine if it is an Acute Myocardial Infarction or myocardial injury due to an underlying chronic condition.         CBC (No Diff) [[id number]]  (Normal) Collected: 10/25/23 0159    Specimen: Blood Updated: 10/25/23 0209     WBC 8.90 10*3/mm3      RBC 4.45 10*6/mm3      Hemoglobin 13.8 g/dL      Hematocrit 41.1 %      MCV 92.5 fL      MCH 31.1 pg      MCHC 33.7 g/dL      RDW 13.6 %      RDW-SD 43.8 fl      MPV 9.3 fL      Platelets 239 10*3/mm3     [place] Draw [[id number]] Collected: 10/24/23 2303    Specimen: Blood Updated: 10/25/23 0015    Narrative:      The following orders were created for panel order [place] Draw.  Procedure                               Abnormality         Status                     ---------                               -----------         ------                     Green Top (Gel)[[id number]]                                  Final result               Lavender Top[[id number]]                                     Final result               Gold Top - SST[[id number]]                                   Final result               Light Blue Top[[id number]]                                   Final result                 Please view results for these tests on the individual orders.    Lavender Top [[id number]] Collected: 10/24/23 2303    Specimen: Blood Updated: 10/25/23 0015     Extra Tube hold for add-on     Comment: Auto resulted       Gold Top - SST [[id number]] Collected: 10/24/23 2303    Specimen: Blood Updated: 10/25/23 0015     Extra Tube Hold for add-ons.     Comment: Auto resulted.       Light Blue Top [[id number]] Collected: 10/24/23 2303    Specimen: Blood Updated: 10/25/23 0015     Extra Tube Hold for add-ons.     Comment: Auto resulted       Green Top (Gel) [[id number]] Collected: 10/24/23 2303    Specimen: Blood Updated: 10/24/23 2342    Protime-INR [[id number]]  (Abnormal) Collected: 10/24/23  2303    Specimen: Blood Updated: 10/24/23 2341     Protime 9.9 Seconds      INR <0.93    aPTT [[id number]]  (Abnormal) Collected: 10/24/23 2303    Specimen: Blood Updated: 10/24/23 2341     PTT 26.3 seconds     Comprehensive Metabolic Panel [[id number]]  (Abnormal) Collected: 10/24/23 2303    Specimen: Blood Updated: 10/24/23 2329     Glucose 205 mg/dL      BUN 20 mg/dL      Creatinine 1.69 mg/dL      Sodium 140 mmol/L      Potassium 4.1 mmol/L      Chloride 100 mmol/L      CO2 28.0 mmol/L      Calcium 10.0 mg/dL      Total Protein 7.6 g/dL      Albumin 4.9 g/dL      ALT (SGPT) 9 U/L      AST (SGOT) 7 U/L      Alkaline Phosphatase 93 U/L      Total Bilirubin 0.3 mg/dL      Globulin 2.7 gm/dL      A/G Ratio 1.8 g/dL      BUN/Creatinine Ratio 11.8     Anion Gap 12.0 mmol/L      eGFR 43.7 mL/min/1.73     Narrative:      GFR Normal >60  Chronic Kidney Disease <60  Kidney Failure <15      Single High Sensitivity Troponin T [[id number]]  (Abnormal) Collected: 10/24/23 2303    Specimen: Blood Updated: 10/24/23 2329     HS Troponin T 15 ng/L     Narrative:      High Sensitive Troponin T Reference Range:  <10.0 ng/L- Negative Female for AMI  <15.0 ng/L- Negative Male for AMI  >=10 - Abnormal Female indicating possible myocardial injury.  >=15 - Abnormal Male indicating possible myocardial injury.   Clinicians would have to utilize clinical acumen, EKG, Troponin, and serial changes to determine if it is an Acute Myocardial Infarction or myocardial injury due to an underlying chronic condition.         BNP [[id number]]  (Normal) Collected: 10/24/23 2303    Specimen: Blood Updated: 10/24/23 2329     proBNP 149.7 pg/mL     Narrative:      This assay is used as an aid in the diagnosis of individuals suspected of having heart failure. It can be used as an aid in the diagnosis of acute decompensated heart failure (ADHF) in patients presenting with signs and symptoms of ADHF to the emergency department (ED). In addition, NT-proBNP of  <300 pg/mL indicates ADHF is not likely.    Age Range Result Interpretation  NT-proBNP Concentration (pg/mL:      <50             Positive            >450                   Gray                 300-450                    Negative             <300    50-75           Positive            >900                  Gray                300-900                  Negative            <300      >75             Positive            >1800                  Gray                300-1800                  Negative            <300    CBC & Differential [[id number]]  (Abnormal) Collected: 10/24/23 2303    Specimen: Blood Updated: 10/24/23 2318    Narrative:      The following orders were created for panel order CBC & Differential.  Procedure                               Abnormality         Status                     ---------                               -----------         ------                     CBC Auto Differential[[id number]]        Abnormal            Final result                 Please view results for these tests on the individual orders.    CBC Auto Differential [[id number]]  (Abnormal) Collected: 10/24/23 2303    Specimen: Blood Updated: 10/24/23 2318     WBC 8.90 10*3/mm3      RBC 4.70 10*6/mm3      Hemoglobin 14.3 g/dL      Hematocrit 43.4 %      MCV 92.4 fL      MCH 30.4 pg      MCHC 32.9 g/dL      RDW 13.5 %      RDW-SD 43.3 fl      MPV 9.1 fL      Platelets 250 10*3/mm3      Neutrophil % 70.9 %      Lymphocyte % 18.1 %      Monocyte % 6.9 %      Eosinophil % 2.9 %      Basophil % 1.2 %      Neutrophils, Absolute 6.30 10*3/mm3      Lymphocytes, Absolute 1.60 10*3/mm3      Monocytes, Absolute 0.60 10*3/mm3      Eosinophils, Absolute 0.30 10*3/mm3      Basophils, Absolute 0.10 10*3/mm3      nRBC 0.0 /100 WBC             Imaging Results (Last 24 Hours)       Procedure Component Value Units Date/Time    XR Chest 1 View [[id number]] Collected: 10/24/23 2329     Updated: 10/24/23 2332    Narrative:      XR CHEST 1 VW    Date of  Exam: 10/24/2023 11:15 PM EDT    Indication: chest pain    Comparison: Chest radiograph dated October 12, 2023    Findings:  The heart is enlarged with changes of midline sternotomy and coronary artery bypass grafting. The pulmonary vascular markings are normal. The lungs are clear.      Impression:      Impression:  No active disease      Electronically Signed: Ezequiel Hedrick MD    10/24/2023 11:30 PM EDT    Workstation ID: [id number]            LAB RESULTS (LAST 7 DAYS)    CBC  Results from last 7 days   Lab Units 10/25/23  0159 10/24/23  2303 10/24/23  0951   WBC 10*3/mm3 8.90 8.90 6.30   RBC 10*6/mm3 4.45 4.70 4.46   HEMOGLOBIN g/dL 13.8 14.3 13.8   HEMATOCRIT % 41.1 43.4 41.8   MCV fL 92.5 92.4 93.7   PLATELETS 10*3/mm3 239 250 235       BMP  Results from last 7 days   Lab Units 10/24/23  2303 10/24/23  0951   SODIUM mmol/L 140 139   POTASSIUM mmol/L 4.1 4.6   CHLORIDE mmol/L 100 103   CO2 mmol/L 28.0 26.7   BUN mg/dL 20 21   CREATININE mg/dL 1.69* 1.66*   GLUCOSE mg/dL 205* 130*       CMP   Results from last 7 days   Lab Units 10/24/23  2303 10/24/23  0951   SODIUM mmol/L 140 139   POTASSIUM mmol/L 4.1 4.6   CHLORIDE mmol/L 100 103   CO2 mmol/L 28.0 26.7   BUN mg/dL 20 21   CREATININE mg/dL 1.69* 1.66*   GLUCOSE mg/dL 205* 130*   ALBUMIN g/dL 4.9  --    BILIRUBIN mg/dL 0.3  --    ALK PHOS U/L 93  --    AST (SGOT) U/L 7  --    ALT (SGPT) U/L 9  --        BNP        TROPONIN  Results from last 7 days   Lab Units 10/25/23  0159   HSTROP T ng/L 98*       CoAg  Results from last 7 days   Lab Units 10/24/23  2303 10/24/23  0951   INR  <0.93* <0.93*   APTT seconds 26.3*  --        Creatinine Clearance  Estimated Creatinine Clearance: 59.8 mL/min (A) (by C-G formula based on SCr of 1.69 mg/dL (H)).    ABG          Radiology  XR Chest 1 View    Result Date: 10/24/2023  Impression: No active disease Electronically Signed: Ezequiel Hedrick MD  10/24/2023 11:30 PM EDT  Workstation ID: [id number]       EKG  I personally viewed and  interpreted the patient's EKG/Telemetry data:  ECG 12 Lead Chest Pain   Preliminary Result   HEART RATE= 98  bpm   RR Interval= 612  ms   HI Interval= 188  ms   P Horizontal Axis= 9  deg   P Front Axis= 67  deg   QRSD Interval= 93  ms   QT Interval= 340  ms   QTcB= 435  ms   QRS Axis= 0  deg   T Wave Axis= 128  deg   - ABNORMAL ECG -   Sinus rhythm   Indeterminate axis   Probable lateral infarct, old   Abnrm T, probable ischemia, anterolateral lds   When compared with ECG of 24-Oct-2023 22:56:28,   No significant change   Electronically Signed By:    Date and Time of Study: 2023-10-25 01:22:00      ECG 12 Lead Chest Pain   Preliminary Result   HEART RATE= 85  bpm   RR Interval= 708  ms   HI Interval= 159  ms   P Horizontal Axis= -22  deg   P Front Axis= 44  deg   QRSD Interval= 99  ms   QT Interval= 348  ms   QTcB= 414  ms   QRS Axis= -3  deg   T Wave Axis= 108  deg   - ABNORMAL ECG -   Sinus rhythm   Consider anterior infarct   Repol abnrm suggests ischemia, lateral leads   Electronically Signed By:    Date and Time of Study: 2023-10-24 22:56:28      ECG 12 Lead Chest Pain    (Results Pending)         Echocardiogram:          Stress Test:  Results for orders placed during the hospital encounter of 01/20/23    Stress Test With Myocardial Perfusion (1 Day)    Interpretation Summary    Left ventricular ejection fraction is normal (Calculated EF = 66%).    Myocardial perfusion imaging indicates a small-sized, mildly severe area of ischemia located in the anterior wall and septal wall.    Impressions are consistent with a high risk study.    This is Cardiolite imaging stress test with small amount of distal anterior wall and apical ischemia.  No myocardial infarction noted.  Left ventricular size and function was normal.    Findings consistent with an indeterminate ECG stress test.        Cardiac Catheterization:  Results for orders placed during the hospital encounter of 01/27/23    Cardiac Catheterization/Vascular  Study    Narrative   Dev Lawton MD, PhD  [facility]  1- date of service    Procedure  A 1 left heart catheterization coronary angiography of native and bypass graft, LV gram in AMARAL position, pullback assessment transaortic valve gradient  2.  Percutaneous coronary intervention to the distal LAD via left internal mammary artery, Marquise 2.0 x 26 drug-eluting stent overlapped proximally 2.25 x 12 Xience drug-eluting stent, postdilated 2.5 distally and 2.5 proximally at 14 to 16 justin, angiographic results with less than 10% visual stenosis, RYLIE-3 flow pre and post, reduction stenosis from tandem 99% lesions to less than 10% residual      Indication  Abnormal stress test, anteroapical ischemia, multivessel CAD    After informed consent patient is brought to the Cath Lab sterilely prepped and draped in usual fashion with exposure of the left groin for left common femoral arterial access via micropuncture modified Stonge technique with fluoroscopic guidance, 6 Danish sheath was placed, 035 guidewire to the aortic valve followed by diagnostic JL 4 and JR4 catheters for selective left and right coronary angiography, JR4 was used across aortic valve followed by hand-injection for LV gram EDP assessment and pullback assessment of the transaortic valve gradient, multipurpose diagnostic catheter was used for SVG to RCA imaging and IMT catheter was used for LIMA to LAD imaging, there was obstructive disease in the distal LAD distal to the stenosis of a widely patent left internal mammary artery.  Proximal LAD was , decision made for intervention via LIMA, he was heparinized with 100 units/kg of heparin for ACT greater than 250.  He was already on background therapy of aspirin and Plavix.  Internal mammary guide was used engage the LIMA assisted by run-through wire and guide liner which was advanced to the midportion of the LIMA graft to avoid traumatic injury to the ostial proximal  segment, initially a 1.5 x 20 NC balloon was used to predilate the lesion up to 20 justin followed by 2.0 x 20 NC balloon distal to proximal fashion followed by PCI with 2.0 x 26 Marquise drug-eluting stent deployed at 16 justin follow-up pullback of the stent balloon 2-3 struts given undersized proximal segment with further postdilatation at 18 to 20 justin with a stent balloon, angiography revealed a focal leading edge dissection therefore a second 2.25 x 12 Xience drug-eluting stent was overlapped and landed immediately distal to the LIMA anastomosis and a healthy portion of the LAD, was deployed at 12 to 14 justin followed by a 2.5 x 12 NC balloon for postdilatation at the midportion of the distal stent avoiding the distal edge back to the proximal portion at 16 justin for 30 seconds per inflation with great angiographic improvement, reduction stenosis from 10 to 90% lesions to less than 10% residual stenosis, RYLIE-3 flow with much improved runoff around the apex, no extension proximally with great retrograde flow to the diagonal branch and left internal mammary artery was widely patent.  All catheters were removed.  Left Cath Lab chest pain-free hemodynamically electrically stable alert talking to staff neurologically grossly intact bilaterally.  6 Malay Angio-Seal was used to close left common femoral arteriotomy with immediate hemostasis and Meints of distal pulses.    Findings  1.  Opening aortic pressure of 169/65 with a mean of 120  2.  Closing pressure unchanged  3.  LV pressure 120/0 with a EDP of 7  4.  Normal LV systolic function with inferior wall hypokinesis which is chronic, overall EF appeared 50 to 55%  6.  Normal transaortic valve gradient    Angiography  1.  Left main distal greater than 90% disease with ostial LAD and circumflex 90% stenosis which is unchanged angiographically from prior images reviewed  2.  Ostial circumflex 90 to 95%, mid circumflex diffusely 80% to 90% in the AV groove extending to 2 obtuse  marginal branches which appear patent, there is diffuse disease with moderate calcification throughout the circumflex which appears again unchanged from prior images  3.  LAD proximally diffusely diseased at the ostium 95%, there is  in the midportion, there is a small diagonal branch diffusely diseased from the proximal portion as well as a for septal   4.  LIMA to LAD is widely patent, distal to the anastomosis the LAD has tandem 99% stenosis prior to wraparound apical portion there is retrograde flow to the proximal diagonal branch and the LAD proximally has no disease  5.  SVG to obtuse marginal branch , known from prior case  6.  SVG to RCA widely patent with proximal stent in the SVG widely patent, SVG is widely patent with good anastomosis and retrograde flow with anastomosis in the PLV and retrograde flow back to the bifurcation with supply the PDA  7.  Native RCA diffusely diseased  in the midportion unchanged from prior    Conclusions recommendations  Abnormal stress, culprit distal LAD tandem 99% lesions distal to anastomosis, PCI overlapping stents 2.25 x 12 Xience overlapped distally with [place] 2.0 x 26 postdilated 2.5 mm reduction stenosis less than 10% residual, RYLIE-3 flow pre and post  Heavily diseased circumflex with heavy calcific disease, closed SVG to circumflex but still with RYLIE-3 flow to obtuse marginal branches, continue medical treatment as unchanged angiographically from prior films  Patent SVG to RCA with widely patent prior intervention proximal SVG graft with Xience drug-eluting stent      EF 50 to 55% with inferior wall motion abnormality which is chronic and old and unchanged, normal LV pressures LVEDP and transaortic valve gradient    Continue aspirin Plavix  High intensity statin therapy  Secondary to mention goals  Admit for observation  Adjust antihypertensives for goal blood pressure less than 130 systolic    Dev Lawton MD, PhD        Other:      ASSESSMENT  & PLAN:    Principal Problem:    NSTEMI (non-ST elevated myocardial infarction)    NSTEMI  His troponins have trended up from 15-98  Continue heparin and Integrilin  Given that his chest pain has now resolved no indication for emergent Cath Lab  We will plan for cardiac catheterization with his primary cardiologist in the a.m.  Continue nitroglycerin drip  Continue aspirin and Plavix    Hypertension  Currently normotensive  Hold blood pressure meds for now    CKD  Gentle hydration in anticipation of cardiac catheterization    Hyperlipidemia  Start statin    Findings discussed with the patient's nurse    Sade Khan MD  10/25/23  03:38 EDT

## 2023-10-25 NOTE — H&P
[facility] Medicine Services  History & Physical    Patient Name: Jamil Self  : 1955  MRN: [id number]  Primary Care Physician:  Desiree Almaguer APRN  Date of admission: 10/24/2023  Date and Time of Service: 10/25/23 at 0350    Subjective      Chief Complaint: chest pain    History of Present Illness: Jamil Self is a 67 y.o. male with past medical history of coronary artery disease status post PCI and CABG, hypertension, hyperlipidemia, diabetes, obstructive sleep apnea, tobacco dependency and chronic kidney disease who presented to [facility] on 10/24/2023 complaining of this pain    Patient reports pain has been intermittent over the past couple weeks.  Normally pain will be present when getting ready for work or on his way home from work but not usually during work hours where he sits at a computer.  He had gone to see his primary cardiologist 5 days ago and was diagnosed with unstable angina and was placed on Lasix and Entresto.  Patient reports pain resolved until last night when he was watching television and had a sudden onset of chest pain that was stabbing in the left-sided chest.  He reports he took a nitroglycerin and initially pain had subsided but then returned so he took a second nitroglycerin and this time pain did not subside and he came to the ER for evaluation.  Patient denies any increase in shortness of breath or nausea.  Pain did not radiate.  Positive for diaphoresis.    In the ED patient was placed on nitroglycerin, heparin drip and Integrilin.  Pain is now subsided.  He has been diagnosed with an NSTEMI as troponins have trended from 15-98.  Plan is for patient to have a cardiac cath today with his primary cardiologist as he had previously been scheduled.      Review of Systems   Constitutional: Positive for diaphoresis.   Cardiovascular:  Positive for chest pain.   All other systems reviewed and are negative.       Personal History     Past Medical  History:   Diagnosis Date    Coronary artery disease     Diabetes mellitus     Hyperlipidemia     Hypertension     LIZETTE (obstructive sleep apnea)        Past Surgical History:   Procedure Laterality Date    CARDIAC CATHETERIZATION N/A 03/21/2021    Procedure: Left Heart Cath;  Surgeon: Jourdan Pillai MD;  Location: [facility] CATH INVASIVE LOCATION;  Service: Cardiology;  Laterality: N/A;    CARDIAC CATHETERIZATION N/A 1/27/2023    Procedure: Left Heart Cath;  Surgeon: Dev Lawton MD;  Location: [facility] CATH INVASIVE LOCATION;  Service: Cardiology;  Laterality: N/A;    CAROTID STENT      CORONARY ARTERY BYPASS GRAFT      1990's, by patient's guess    CORONARY STENT PLACEMENT         Family History: family history includes No Known Problems in his father and mother. Otherwise pertinent FHx was reviewed and not pertinent to current issue.    Social History:  reports that he quit smoking 5 days ago. His smoking use included cigarettes. He has a 40.00 pack-year smoking history. He has never used smokeless tobacco. He reports that he does not drink alcohol and does not use drugs.    Home Medications:  Prior to Admission Medications       Prescriptions Last Dose Informant Patient Reported? Taking?    amLODIPine (NORVASC) 10 MG tablet   Yes No    Take 1 tablet by mouth Daily.    aspirin 81 MG EC tablet   No No    Take 1 tablet by mouth Daily.    atorvastatin (LIPITOR) 20 MG tablet   Yes No    Take 1 tablet by mouth Daily.    carvedilol (COREG) 6.25 MG tablet   No No    Take 1 tablet by mouth 2 (Two) Times a Day.    clopidogrel (PLAVIX) 75 MG tablet   No No    Take 1 tablet by mouth Daily.    furosemide (LASIX) 20 MG tablet   No No    Take 1 tablet by mouth As Needed (lower extremity swelling).    Patient taking differently:  Take 1 tablet by mouth Daily.    hydrALAZINE (APRESOLINE) 25 MG tablet   No No    TAKE 1 TABLET BY MOUTH TWICE A DAY    Patient taking differently:  Take 1 tablet by mouth 2 (Two)  Times a Day.    isosorbide mononitrate (IMDUR) 30 MG 24 hr tablet   No No    Take 2 tablets by mouth Daily.    losartan (COZAAR) 100 MG tablet   No No    TAKE 1 TABLET BY MOUTH EVERY DAY    Patient taking differently:  Take 1 tablet by mouth Daily.    metFORMIN (GLUCOPHAGE) 1000 MG tablet   Yes No    Take 0.5 tablets by mouth 2 (Two) Times a Day With Meals.              Allergies:  No Known Allergies    Objective      Vitals:   Temp:  [98.2 °F (36.8 °C)] 98.2 °F (36.8 °C)  Heart Rate:  [80-96] 80  Resp:  [18] 18  BP: (108-191)/(55-84) 116/69    Physical Exam  Constitutional:       Appearance: He is obese.   Eyes:      Pupils: Pupils are equal, round, and reactive to light.   Cardiovascular:      Rate and Rhythm: Normal rate and regular rhythm.   Pulmonary:      Effort: Pulmonary effort is normal.      Breath sounds: Normal breath sounds.   Abdominal:      Comments: Obese abdomen.  Soft   Musculoskeletal:      Comments: Edema right lower extremity greater than left   Skin:     General: Skin is warm and dry.   Neurological:      Mental Status: He is alert and oriented to person, place, and time.   Psychiatric:         Mood and Affect: Mood normal.         Behavior: Behavior normal.            Result Review    Result Review:  I have personally reviewed the results from the time of this admission to 10/25/2023 03:52 EDT and agree with these findings:  [x]  Laboratory  [x]  Microbiology  [x]  Radiology  []  EKG/Telemetry   []  Cardiology/Vascular   []  Pathology  [x]  Old records  []  Other:  Most notable findings include:   XR Chest 1 View    Result Date: 10/24/2023  Impression: No active disease Electronically Signed: Ezequiel Hedrick MD  10/24/2023 11:30 PM EDT  Workstation ID: [id number]    ECG 12 Lead Chest Pain   Preliminary Result   HEART RATE= 98  bpm   RR Interval= 612  ms   AR Interval= 188  ms   P Horizontal Axis= 9  deg   P Front Axis= 67  deg   QRSD Interval= 93  ms   QT Interval= 340  ms   QTcB= 435  ms   QRS  Axis= 0  deg   T Wave Axis= 128  deg   - ABNORMAL ECG -   Sinus rhythm   Indeterminate axis   Probable lateral infarct, old   Abnrm T, probable ischemia, anterolateral lds   When compared with ECG of 24-Oct-2023 22:56:28,   No significant change   Electronically Signed By:    Date and Time of Study: 2023-10-25 01:22:00      ECG 12 Lead Chest Pain   Preliminary Result   HEART RATE= 85  bpm   RR Interval= 708  ms   SD Interval= 159  ms   P Horizontal Axis= -22  deg   P Front Axis= 44  deg   QRSD Interval= 99  ms   QT Interval= 348  ms   QTcB= 414  ms   QRS Axis= -3  deg   T Wave Axis= 108  deg   - ABNORMAL ECG -   Sinus rhythm   Consider anterior infarct   Repol abnrm suggests ischemia, lateral leads   Electronically Signed By:    Date and Time of Study: 2023-10-24 22:56:28      ECG 12 Lead Chest Pain    (Results Pending)      CBC          10/12/2023    00:10 10/24/2023    09:51 10/24/2023    23:03 10/25/2023    01:59   CBC   WBC 8.70  6.30  8.90  8.90    RBC 4.74  4.46  4.70  4.45    Hemoglobin 14.7  13.8  14.3  13.8    Hematocrit 43.4  41.8  43.4  41.1    MCV 91.5  93.7  92.4  92.5    MCH 31.0  30.9  30.4  31.1    MCHC 33.8  32.9  32.9  33.7    RDW 13.7  13.3  13.5  13.6    Platelets 228  235  250  239       CMP          1/28/2023    03:59 10/12/2023    00:10 10/24/2023    09:51 10/24/2023    23:03   CMP   Glucose 151  105  130  205    BUN 18  26  21  20    Creatinine 1.22  1.55  1.66  1.69    EGFR 64.6  48.5  44.6  43.7    Sodium 140  140  139  140    Potassium 4.0  4.6  4.6  4.1    Chloride 105  103  103  100    Calcium 9.4  9.8  9.8  10.0    Total Protein  7.9   7.6    Albumin  4.7   4.9    Globulin  3.2   2.7    Total Bilirubin  0.3   0.3    Alkaline Phosphatase  95   93    AST (SGOT)  14   7    ALT (SGPT)  14   9    Albumin/Globulin Ratio  1.5   1.8    BUN/Creatinine Ratio 14.8  16.8  12.7  11.8    Anion Gap 10.0  10.0  9.3  12.0           Assessment & Plan        Active Hospital Problems:  Active Hospital  Problems    Diagnosis     **NSTEMI (non-ST elevated myocardial infarction)      Plan:   NSTEMI  -Opponent trended from 15-98  - hx CAD s/p CABG and PCI   -Chest pain has now resolved on nitroglycerin drip  -Patient was evaluated by cardiology and plan for cardiac cath today  -Continue heparin and Integrilin  -Cardiology also recommended continue aspirin and Plavix  -Monitor for any sign of bleed    Hypertension  -Blood pressure stable  -We will hold blood pressure medications for now    Acute kidney injury on chronic kidney disease  - Gentle hydration  - consult nephrology   - avoid nephrotoxic medication    Hyperlipidemia  -We will increase statin from 20 mg to 80 mg  -Check lipid profile    Diabetes  -Hold metformin  -A1c 6.0 1/28/23  - SS prn     LIZETTE  - O2 prn     Tobacco dependency  - pt denies need for nicotine patch  - tobacco cessation education    Obesity  - BMI 40.87   - recommend diet and lifestyle modification geared at weight loss       DVT prophylaxis:  Medical DVT prophylaxis orders are present.    CODE STATUS:       Admission Status:  I believe this patient meets inpatient status.    I discussed the patient's findings and my recommendations with patient and family.    This patient has been examined wearing appropriate Personal Protective Equipment   Signature: Electronically signed by ARIK Lam, 10/25/23, 03:52 EDT.  [facility]

## 2023-10-26 ENCOUNTER — READMISSION MANAGEMENT (OUTPATIENT)
Dept: CALL CENTER | Facility: HOSPITAL | Age: 68
End: 2023-10-26
Payer: COMMERCIAL

## 2023-10-26 VITALS
DIASTOLIC BLOOD PRESSURE: 72 MMHG | WEIGHT: 305.56 LBS | TEMPERATURE: 98.9 F | BODY MASS INDEX: 41.39 KG/M2 | HEART RATE: 93 BPM | HEIGHT: 72 IN | SYSTOLIC BLOOD PRESSURE: 124 MMHG | RESPIRATION RATE: 16 BRPM | OXYGEN SATURATION: 93 %

## 2023-10-26 LAB
ALBUMIN SERPL-MCNC: 3.7 G/DL (ref 3.5–5.2)
ANION GAP SERPL CALCULATED.3IONS-SCNC: 8 MMOL/L (ref 5–15)
BUN SERPL-MCNC: 17 MG/DL (ref 8–23)
BUN/CREAT SERPL: 13 (ref 7–25)
CALCIUM SPEC-SCNC: 9.1 MG/DL (ref 8.6–10.5)
CHLORIDE SERPL-SCNC: 103 MMOL/L (ref 98–107)
CHOLEST SERPL-MCNC: 90 MG/DL (ref 0–200)
CO2 SERPL-SCNC: 27 MMOL/L (ref 22–29)
CREAT SERPL-MCNC: 1.31 MG/DL (ref 0.76–1.27)
DEPRECATED RDW RBC AUTO: 45.1 FL (ref 37–54)
EGFRCR SERPLBLD CKD-EPI 2021: 58.9 ML/MIN/1.73
ERYTHROCYTE [DISTWIDTH] IN BLOOD BY AUTOMATED COUNT: 13.5 % (ref 12.3–15.4)
GLUCOSE BLDC GLUCOMTR-MCNC: 109 MG/DL (ref 70–105)
GLUCOSE BLDC GLUCOMTR-MCNC: 119 MG/DL (ref 70–105)
GLUCOSE SERPL-MCNC: 120 MG/DL (ref 65–99)
HCT VFR BLD AUTO: 36.8 % (ref 37.5–51)
HDLC SERPL-MCNC: 36 MG/DL (ref 40–60)
HGB BLD-MCNC: 12.4 G/DL (ref 13–17.7)
LDLC SERPL CALC-MCNC: 32 MG/DL (ref 0–100)
LDLC/HDLC SERPL: 0.81 {RATIO}
MCH RBC QN AUTO: 30.8 PG (ref 26.6–33)
MCHC RBC AUTO-ENTMCNC: 33.8 G/DL (ref 31.5–35.7)
MCV RBC AUTO: 91.1 FL (ref 79–97)
PHOSPHATE SERPL-MCNC: 3.3 MG/DL (ref 2.5–4.5)
PLATELET # BLD AUTO: 207 10*3/MM3 (ref 140–450)
PMV BLD AUTO: 9 FL (ref 6–12)
POTASSIUM SERPL-SCNC: 4.3 MMOL/L (ref 3.5–5.2)
QT INTERVAL: 371 MS
QTC INTERVAL: 422 MS
RBC # BLD AUTO: 4.04 10*6/MM3 (ref 4.14–5.8)
SODIUM SERPL-SCNC: 138 MMOL/L (ref 136–145)
TRIGL SERPL-MCNC: 124 MG/DL (ref 0–150)
VLDLC SERPL-MCNC: 22 MG/DL (ref 5–40)
WBC NRBC COR # BLD: 9.2 10*3/MM3 (ref 3.4–10.8)

## 2023-10-26 PROCEDURE — 85027 COMPLETE CBC AUTOMATED: CPT | Performed by: INTERNAL MEDICINE

## 2023-10-26 PROCEDURE — 80061 LIPID PANEL: CPT | Performed by: INTERNAL MEDICINE

## 2023-10-26 PROCEDURE — 36415 COLL VENOUS BLD VENIPUNCTURE: CPT | Performed by: INTERNAL MEDICINE

## 2023-10-26 PROCEDURE — 80069 RENAL FUNCTION PANEL: CPT | Performed by: INTERNAL MEDICINE

## 2023-10-26 PROCEDURE — 93005 ELECTROCARDIOGRAM TRACING: CPT | Performed by: INTERNAL MEDICINE

## 2023-10-26 PROCEDURE — 99232 SBSQ HOSP IP/OBS MODERATE 35: CPT | Performed by: INTERNAL MEDICINE

## 2023-10-26 PROCEDURE — 82948 REAGENT STRIP/BLOOD GLUCOSE: CPT

## 2023-10-26 RX ADMIN — ISOSORBIDE MONONITRATE 60 MG: 60 TABLET, EXTENDED RELEASE ORAL at 08:15

## 2023-10-26 RX ADMIN — AMLODIPINE BESYLATE 10 MG: 5 TABLET ORAL at 08:15

## 2023-10-26 RX ADMIN — LOSARTAN POTASSIUM 100 MG: 50 TABLET, FILM COATED ORAL at 08:16

## 2023-10-26 RX ADMIN — HYDRALAZINE HYDROCHLORIDE 25 MG: 25 TABLET, FILM COATED ORAL at 08:15

## 2023-10-26 RX ADMIN — TICAGRELOR 90 MG: 90 TABLET ORAL at 08:15

## 2023-10-26 RX ADMIN — ASPIRIN 81 MG: 81 TABLET, COATED ORAL at 08:15

## 2023-10-26 RX ADMIN — CARVEDILOL 6.25 MG: 6.25 TABLET, FILM COATED ORAL at 08:15

## 2023-10-26 RX ADMIN — FUROSEMIDE 20 MG: 20 TABLET ORAL at 08:15

## 2023-10-26 NOTE — OUTREACH NOTE
Prep Survey      Flowsheet Row Responses   [facility] facility patient discharged from? [facility]   Is LACE score < 7 ? No   Eligibility Readm Mgmt   Discharge diagnosis NSTEMI (non-ST elevated myocardial infarction)   Does the patient have one of the following disease processes/diagnoses(primary or secondary)? Acute MI (STEMI,NSTEMI)   Does the patient have Home health ordered? No   Is there a DME ordered? No   Prep survey completed? Yes            Karolina KIM - Registered Nurse

## 2023-10-26 NOTE — CONSULTS
Pt seen and given Cardiac Rehab brochure and informed of program information, also given CIG, and handout on  Anti platelets.  Patient informed that we will call him next week to follow up.

## 2023-10-26 NOTE — PROGRESS NOTES
[facility] Progress Note      Patient Name: Jamil Self  : 1955  MRN: [id number]  Primary Care Physician:  Desiree Almaguer APRN  Date of admission: 10/24/2023    Subjective     Interval History:   Patient was seen and examined this morning.  He was feeling better.  Chest pain, dyspnea improved.  Denies any nausea or vomiting    Review of Systems:   As noted above    Objective     Vitals:   Temp:  [97.4 °F (36.3 °C)-98.9 °F (37.2 °C)] 98.9 °F (37.2 °C)  Heart Rate:  [72-93] 93  Resp:  [16-21] 16  BP: ()/() 124/72    Intake/Output Summary (Last 24 hours) at 10/26/2023 1126  Last data filed at 10/26/2023 1100  Gross per 24 hour   Intake 662 ml   Output 3400 ml   Net -2738 ml       Physical Exam:    General Appearance: NAD  HEENT: oral mucosa normal, nonicteric sclera  Neck: supple, no JVD  Lungs: CTA  Heart: RRR, normal S1 and S2  Abdomen: soft, nondistended  Extremities: no edema  Neuro: Awake alert and moving all extremities    Scheduled Meds:     amLODIPine, 10 mg, Oral, Daily  aspirin, 81 mg, Oral, Daily  atorvastatin, 80 mg, Oral, Nightly  carvedilol, 6.25 mg, Oral, BID  furosemide, 20 mg, Oral, Daily  hydrALAZINE, 25 mg, Oral, BID  insulin lispro, 2-7 Units, Subcutaneous, Q6H  isosorbide mononitrate, 60 mg, Oral, Daily  losartan, 100 mg, Oral, Daily  senna-docusate sodium, 2 tablet, Oral, BID  ticagrelor, 90 mg, Oral, BID      IV Meds:   nitroglycerin, 10-50 mcg/min, Last Rate: 5 mcg/min (10/25/23 8922)        Results Reviewed:   I have personally reviewed the results from the time of this admission to 10/26/2023 11:26 EDT     Results from last 7 days   Lab Units 10/26/23  0006 10/24/23  2303 10/24/23  0951   SODIUM mmol/L 138 140 139   POTASSIUM mmol/L 4.3 4.1 4.6   CHLORIDE mmol/L 103 100 103   CO2 mmol/L 27.0 28.0 26.7   BUN mg/dL 17 20 21   CREATININE mg/dL 1.31* 1.69* 1.66*   CALCIUM mg/dL 9.1 10.0 9.8   BILIRUBIN mg/dL  --  0.3  --    ALK PHOS U/L  --   93  --    ALT (SGPT) U/L  --  9  --    AST (SGOT) U/L  --  7  --    GLUCOSE mg/dL 120* 205* 130*     Estimated Creatinine Clearance: 76.8 mL/min (A) (by C-G formula based on SCr of 1.31 mg/dL (H)).  Results from last 7 days   Lab Units 10/26/23  0006   PHOSPHORUS mg/dL 3.3         Results from last 7 days   Lab Units 10/26/23  0006 10/25/23  0159 10/24/23  2303 10/24/23  0951   WBC 10*3/mm3 9.20 8.90 8.90 6.30   HEMOGLOBIN g/dL 12.4* 13.8 14.3 13.8   PLATELETS 10*3/mm3 207 239 250 235     Results from last 7 days   Lab Units 10/24/23  2303 10/24/23  0951   INR  <0.93* <0.93*       Assessment / Plan     ASSESSMENT:  Chronic kidney disease stage IIIa.  Patient has underlying CKD due to hypertensive, diabetes and some azotemia related to ARB and diuretics.  creatinine improved to 1.3 mg/TF with IV hydration.  electrolytes, volume status okay  NSTEMI.  status post PCI . Patient chest pain-free at this time  Hypertension with chronic kidney disease. Blood pressure better    PLAN:  Okay to discharge from the standpoint  Follow-up in 2-3 months in my office    Kane Kessler MD  10/26/23  11:26 EDT    [facility]  [phone]

## 2023-10-26 NOTE — PAYOR COMM NOTE
"Discharge notification for case# [id number]     -----------    THANK YOU,    ABHI Mccallum, RN  Utilization Review  [facility]  Phone: [phone]  Fax: [phone]      NPI: [id number]  Tax ID: [id number]      Libby Lozano (68 y.o. Male)       Date of Birth   1955    Social Security Number       Address   [address] [place] [address] [address] [place] [place] [address]    Home Phone   [phone]    MRN   [id number]       [employer]ism   [employer]    Marital Status                               Admission Date   10/24/23    Admission Type   Emergency    Admitting Provider   Sandee Araya DO    Attending Provider       Department, Room/Bed   [facility] CARE,        Discharge Date   10/26/2023    Discharge Disposition   Home or Self Care    Discharge Destination                                 Attending Provider: (none)   Allergies: No Known Allergies    Isolation: None   Infection: None   Code Status: CPR    Ht: 182.9 cm (72\")   Wt: 139 kg (305 lb 8.9 oz)    Admission Cmt: None   Principal Problem: NSTEMI (non-ST elevated myocardial infarction) [I21.4]                   Active Insurance as of 10/24/2023       Primary Coverage       Payor Plan Insurance Group Employer/Plan Group    [facility] [employer] [facility] [employer] [facility] PPO [id number]       Payor Plan Address Payor Plan Phone Number Payor Plan Fax Number Effective Dates    [address] [phone]  2021 - None Entered    [place] [address]         Subscriber Name Subscriber Birth Date Member ID       LIBBY LOZANO 1955 [id number]                     Emergency Contacts        (Rel.) Home Phone Work Phone Mobile Phone    BLAKERASHEEDA (Spouse) -- -- [phone]                 Discharge Summary        Teddy Harrison MD at 10/26/23 [address] [place] [address]         DISCHARGE SUMMARY    Patient Name: Libby Lozano  : 1955  MRN: [id number]    Date of " Admission: 10/24/2023  Date of Discharge:  10/26/2023  Primary Care Physician: Desiree Almaguer APRN    Consults       Date and Time Order Name Status Description    10/25/2023  7:18 AM Inpatient Nephrology Consult Completed     10/25/2023  4:03 AM Inpatient Cardiology Consult      10/25/2023  2:43 AM Hospitalist (on-call MD unless specified)              Presenting Problem:   Unstable angina [I20.0]  Unstable angina pectoris [I20.0]  Acute coronary syndrome [I24.9]  NSTEMI (non-ST elevated myocardial infarction) [I21.4]    Active and Resolved Hospital Problems:  Active Hospital Problems    Diagnosis POA    **NSTEMI (non-ST elevated myocardial infarction) [I21.4] Yes    Unstable angina [I20.0] Unknown      Resolved Hospital Problems   No resolved problems to display.         Hospital Course     Hospital Course:  Jamil Self is a 67 y.o. male with past medical history of coronary artery disease status post PCI and CABG, hypertension, hyperlipidemia, diabetes, obstructive sleep apnea, tobacco dependency and chronic kidney disease who presented to [facility] on 10/24/2023 complaining of this pain     Patient reports pain has been intermittent over the past couple weeks.  Normally pain will be present when getting ready for work or on his way home from work but not usually during work hours where he sits at a computer.  He had gone to see his primary cardiologist 5 days ago and was diagnosed with unstable angina and was placed on Lasix and Entresto.  Patient reports pain resolved until last night when he was watching television and had a sudden onset of chest pain that was stabbing in the left-sided chest.  He reports he took a nitroglycerin and initially pain had subsided but then returned so he took a second nitroglycerin and this time pain did not subside and he came to the ER for evaluation.  Patient denies any increase in shortness of breath or nausea.  Pain did not radiate.  Positive for  diaphoresis.     In the ED patient was placed on nitroglycerin, heparin drip and Integrilin.  Pain is now subsided.  He has been diagnosed with an NSTEMI as troponins have trended from 15-98.  Plan is for patient to have a cardiac cath today with his primary cardiologist as he had previously been scheduled.       Patient was evaluated by cardiologist patient had a cardiac catheterization done    1.  Left heart catheterization coronary angiography left ventriculography in AMARAL position with imaging of native vessels and bypass grafts  2.,  Percutaneous coronary invention with balloon angioplasty of the distal left main, ostial proximal LAD and ostial proximal circumflex  3.  PCI with Xience 2.5 x 15 drug-eluting stent to the ostial proximal LAD postdilated 3.0 at 18 justin reducing stenosis from greater than 95% to 0% residual  4.  Percutaneous coronary invention PCI with Xience 2.5 x 23 Xience drug-eluting stent ostial proximal circumflex back to the ostial left main postdilated 3.0-3.2 distally avoiding the distal edge and the circumflex ostium and 3.5 x 8 NC balloon at 18 justin in the left main back to the ostium, reduction of stenosis less than 2% residual at the ostium of the circumflex, reduction of 80% stenosis to 90% stenosis in the distal left main to 0% residual  RYLIE II flow preprocedurally, RYLIE-3 flow post procedurally    Patient was cleared to be discharged by cardiology today, he will be going home he is to follow-up with his primary care physician in 3 to 5 days as well as his cardiologist as scheduled.      Day of Discharge     Vital Signs:  Temp:  [97.4 °F (36.3 °C)-98.9 °F (37.2 °C)] 98.9 °F (37.2 °C)  Heart Rate:  [72-93] 93  Resp:  [12-21] 16  BP: ()/() 124/72  Flow (L/min):  [4] 4  Physical Exam:  Constitutional: Awake, alert   Eyes: PERRLA, sclerae anicteric, no conjunctival injection   HENT: NCAT, mucous membranes moist   Neck: Supple, no thyromegaly, no lymphadenopathy, trachea  midline   Respiratory: Clear to auscultation bilaterally, nonlabored respirations    Cardiovascular: RRR, no murmurs, rubs, or gallops, palpable pedal pulses bilaterally   Gastrointestinal: Positive bowel sounds, soft, nontender, nondistended   Musculoskeletal: No bilateral ankle edema, no clubbing or cyanosis to extremities   Psychiatric: Appropriate affect, cooperative   Neurologic: Oriented x 3, strength symmetric in all extremities, Cranial Nerves grossly intact to confrontation, speech clear   Skin: No rashes     Pertinent  and/or Most Recent Results     LAB RESULTS:      Lab 10/26/23  0006 10/25/23  0541 10/25/23  0159 10/24/23  2303 10/24/23  0951   WBC 9.20  --  8.90 8.90 6.30   HEMOGLOBIN 12.4*  --  13.8 14.3 13.8   HEMATOCRIT 36.8*  --  41.1 43.4 41.8   PLATELETS 207  --  239 250 235   NEUTROS ABS  --   --   --  6.30  --    LYMPHS ABS  --   --   --  1.60  --    MONOS ABS  --   --   --  0.60  --    EOS ABS  --   --   --  0.30  --    MCV 91.1  --  92.5 92.4 93.7   PROTIME  --   --   --  9.9 9.8   APTT  --  22.2*  --  26.3*  --          Lab 10/26/23  0006 10/25/23  0159 10/24/23  2303 10/24/23  0951   SODIUM 138  --  140 139   POTASSIUM 4.3  --  4.1 4.6   CHLORIDE 103  --  100 103   CO2 27.0  --  28.0 26.7   ANION GAP 8.0  --  12.0 9.3   BUN 17  --  20 21   CREATININE 1.31*  --  1.69* 1.66*   EGFR 58.9*  --  43.7* 44.6*   GLUCOSE 120*  --  205* 130*   CALCIUM 9.1  --  10.0 9.8   PHOSPHORUS 3.3  --   --   --    HEMOGLOBIN A1C  --  6.20*  --   --          Lab 10/26/23  0006 10/24/23  2303   TOTAL PROTEIN  --  7.6   ALBUMIN 3.7 4.9   GLOBULIN  --  2.7   ALT (SGPT)  --  9   AST (SGOT)  --  7   BILIRUBIN  --  0.3   ALK PHOS  --  93         Lab 10/25/23  0358 10/25/23  0159 10/24/23  2303 10/24/23  0951   PROBNP  --   --  149.7  --    HSTROP T 274* 98* 15*  --    PROTIME  --   --  9.9 9.8   INR  --   --  <0.93* <0.93*         Lab 10/26/23  0006 10/25/23  0358   CHOLESTEROL 90 102   LDL CHOL 32 41   HDL CHOL 36* 42    TRIGLYCERIDES 124 102             Brief Urine Lab Results  (Last result in the past 365 days)        Color   Clarity   Blood   Leuk Est   Nitrite   Protein   CREAT   Urine HCG        10/25/23 1330             59.2         10/25/23 1330 Yellow   Clear   Small (1+)   Negative   Negative   30 mg/dL (1+)                 Microbiology Results (last 10 days)       ** No results found for the last 240 hours. **            XR Chest 1 View    Result Date: 10/24/2023  Impression: Impression: No active disease Electronically Signed: Ezequiel Hedrick MD  10/24/2023 11:30 PM EDT  Workstation ID: [id number]    XR Chest 1 View    Result Date: 10/12/2023  Impression: Impression: No acute cardiopulmonary abnormality. Electronically Signed: Jr Subramanian MD  10/12/2023 12:47 AM EDT  Workstation ID: [id number]     Results for orders placed during the hospital encounter of 08/08/22    Venous w Reflux Lower Extremity - Right/Left CAR    Interpretation Summary  · Deep venous reflux right femoral vein.  · Superficial venous reflux right saphenofemoral junction and greater saphenous vein.  · No deep or superficial venous thrombus.      Results for orders placed during the hospital encounter of 08/08/22    Venous w Reflux Lower Extremity - Right/Left CAR    Interpretation Summary  · Deep venous reflux right femoral vein.  · Superficial venous reflux right saphenofemoral junction and greater saphenous vein.  · No deep or superficial venous thrombus.          Labs Pending at Discharge:        Discharge Details        Discharge Medications        New Medications        Instructions Start Date   ticagrelor 90 MG tablet tablet  Commonly known as: BRILINTA   90 mg, Oral, 2 Times Daily             Continue These Medications        Instructions Start Date   amLODIPine 10 MG tablet  Commonly known as: NORVASC   1 tablet, Oral, Daily      Aspirin Adult Low Strength 81 MG EC tablet  Generic drug: aspirin   81 mg, Oral, Daily      atorvastatin 20 MG  tablet  Commonly known as: LIPITOR   20 mg, Oral, Daily      carvedilol 6.25 MG tablet  Commonly known as: COREG   6.25 mg, Oral, 2 Times Daily      furosemide 20 MG tablet  Commonly known as: LASIX   20 mg, Oral, Daily      hydrALAZINE 25 MG tablet  Commonly known as: APRESOLINE   25 mg, Oral, 2 Times Daily      isosorbide mononitrate 30 MG 24 hr tablet  Commonly known as: IMDUR   60 mg, Oral, Daily      losartan 100 MG tablet  Commonly known as: COZAAR   100 mg, Oral, Daily      metFORMIN 1000 MG tablet  Commonly known as: GLUCOPHAGE   500 mg, Oral, 2 Times Daily With Meals             Stop These Medications      clopidogrel 75 MG tablet  Commonly known as: PLAVIX              No Known Allergies      Discharge Disposition:   Home or Self Care    Discharge Condition: .cond    Diet:  Hospital:  Diet Order   Procedures    Diet: Cardiac Diets; Healthy Heart (2-3 Na+); Texture: Regular Texture (IDDSI 7); Fluid Consistency: Thin (IDDSI 0)         Discharge Activity:         CODE STATUS:  Code Status and Medical Interventions:   Ordered at: 10/25/23 0413     Code Status (Patient has no pulse and is not breathing):    CPR (Attempt to Resuscitate)     Medical Interventions (Patient has pulse or is breathing):    Full Support         Future Appointments   Date Time Provider Department Center   1/23/2024  2:30 PM Dev Lawton MD [facility]       Additional Instructions for the Follow-ups that You Need to Schedule       Ambulatory Referral to Cardiac Rehab   As directed              Time spent on Discharge including face to face service:  30 minutes    Teddy Harrison MD      Electronically signed by Teddy Harrison MD at 10/26/23 1009       Discharge Order (From admission, onward)       Start     Ordered    10/26/23 0815  Discharge patient  Once        Expected Discharge Date: 10/26/23   Expected Discharge Time: Midday   Discharge Disposition: Home or Self Care   Physician of Record for Attribution -  Please select from Treatment Team: KHADIJAH ROQUE [[id number]]   Review needed by CMO to determine Physician of Record: No   Please choose which facility the patient is currently admitted if they are being discharged to another facility or unit.: [facility]      Question Answer Comment   Physician of Record for Attribution - Please select from Treatment Team KHADIJAH ROQUE    Review needed by CMO to determine Physician of Record No    Please choose which facility the patient is currently admitted if they are being discharged to another facility or unit. [facility]        10/26/23 0816

## 2023-10-26 NOTE — PROGRESS NOTES
Cardiology Progress note    Patient Care Team:  Desiree Almaguer APRN as PCP - General  Dev Lawton MD as Consulting Physician (Cardiology)      SUBJECTIVE: no AE, s/p LM Lad LCX PCI  Groin stable  No f/c/s/n/v/d  CP free.     REVIEW OF SYSTEMS: Some 14-point review of systems is negative except what is mentioned in the HPI relative to the current complaint.     PAST MEDICAL HISTORY:   Past Medical History:   Diagnosis Date    Coronary artery disease     Diabetes mellitus     Hyperlipidemia     Hypertension     LIZETTE (obstructive sleep apnea)        ALLERGIES:   No Known Allergies      PAST SURGICAL HISTORY:   Past Surgical History:   Procedure Laterality Date    CARDIAC CATHETERIZATION N/A 2021    Procedure: Left Heart Cath;  Surgeon: Jourdan Pillai MD;  Location: [facility] CATH INVASIVE LOCATION;  Service: Cardiology;  Laterality: N/A;    CARDIAC CATHETERIZATION N/A 2023    Procedure: Left Heart Cath;  Surgeon: Dev Lawton MD;  Location:  [facility] CATH INVASIVE LOCATION;  Service: Cardiology;  Laterality: N/A;    CARDIAC CATHETERIZATION N/A 10/25/2023    Procedure: Left Heart Cath;  Surgeon: Dev Lawton MD;  Location: [facility] CATH INVASIVE LOCATION;  Service: Cardiology;  Laterality: N/A;    CAROTID STENT      CORONARY ARTERY BYPASS GRAFT      , by patient's guess    CORONARY STENT PLACEMENT            FAMILY HISTORY:   Family History   Problem Relation Age of Onset    No Known Problems Mother     No Known Problems Father          SOCIAL HISTORY:   Social History     Socioeconomic History    Marital status:    Tobacco Use    Smoking status: Former     Packs/day: 1.00     Years: 40.00     Additional pack years: 0.00     Total pack years: 40.00     Types: Cigarettes     Quit date: 10/20/2023     Years since quittin.0    Smokeless tobacco: Never   Vaping Use    Vaping Use: Never used   Substance and Sexual Activity    Alcohol use: Never    Drug use: Never     Sexual activity: Defer       CURRENT MEDICATIONS:     Current Facility-Administered Medications:     acetaminophen (TYLENOL) tablet 650 mg, 650 mg, Oral, Q4H PRN, Dev Lawton MD    amLODIPine (NORVASC) tablet 10 mg, 10 mg, Oral, Daily, Dev Lawton MD, 10 mg at 10/26/23 0815    aspirin EC tablet 81 mg, 81 mg, Oral, Daily, Teddy Harrison MD, 81 mg at 10/26/23 0815    atorvastatin (LIPITOR) tablet 80 mg, 80 mg, Oral, Nightly, Dev Lawton MD, 80 mg at 10/25/23 2049    atropine sulfate injection 0.5 mg, 0.5 mg, Intravenous, Q5 Min PRN, Dev Lawton MD    sennosides-docusate (PERICOLACE) 8.6-50 MG per tablet 2 tablet, 2 tablet, Oral, BID **AND** polyethylene glycol (MIRALAX) packet 17 g, 17 g, Oral, Daily PRN **AND** bisacodyl (DULCOLAX) EC tablet 5 mg, 5 mg, Oral, Daily PRN **AND** bisacodyl (DULCOLAX) suppository 10 mg, 10 mg, Rectal, Daily PRN, Dev Lawton MD    carvedilol (COREG) tablet 6.25 mg, 6.25 mg, Oral, BID, Dev Lawton MD, 6.25 mg at 10/26/23 0815    dextrose (D50W) (25 g/50 mL) IV injection 25 g, 25 g, Intravenous, Q15 Min PRN, Dev Lawton MD    dextrose (GLUTOSE) oral gel 15 g, 15 g, Oral, Q15 Min PRN, Dev Lawton MD    furosemide (LASIX) tablet 20 mg, 20 mg, Oral, Daily, Dev Lawton MD, 20 mg at 10/26/23 0815    glucagon (GLUCAGEN) injection 1 mg, 1 mg, Intramuscular, Q15 Min PRN, Dev Lawton MD    hydrALAZINE (APRESOLINE) tablet 25 mg, 25 mg, Oral, BID, Dev Lawton MD, 25 mg at 10/26/23 0815    insulin lispro (HUMALOG/ADMELOG) injection 2-7 Units, 2-7 Units, Subcutaneous, Q6H, Dev Lawton MD    isosorbide mononitrate (IMDUR) 24 hr tablet 60 mg, 60 mg, Oral, Daily, Dev Lawton MD, 60 mg at 10/26/23 0815    losartan (COZAAR) tablet 100 mg, 100 mg, Oral, Daily, Dev Lawton MD, 100 mg at 10/26/23 0816    muscle rub (BenGay) 10-15  % cream 1 application , 1 application , Topical, Q1H PRN, Dev Lawton MD, 1 application  at 10/25/23 1554    nitroglycerin (NITROSTAT) SL tablet 0.4 mg, 0.4 mg, Sublingual, Q5 Min PRN, Dev Lawton MD    nitroglycerin (TRIDIL) 200 mcg/ml infusion, 10-50 mcg/min, Intravenous, Titrated, Dev aLwton MD, Last Rate: 1.5 mL/hr at 10/25/23 1745, 5 mcg/min at 10/25/23 1745    sodium chloride 0.9 % infusion 250 mL, 250 mL, Intravenous, Once PRN, Dev Lawton MD    ticagrelor (BRILINTA) tablet 90 mg, 90 mg, Oral, BID, Dev Lawton MD, 90 mg at 10/26/23 0815      DIAGNOSTIC DATA:     I reviewed the patient's new clinical results.    Lab Results (last 24 hours)       Procedure Component Value Units Date/Time    POC Glucose Once [[id number]]  (Abnormal) Collected: 10/26/23 0707    Specimen: Blood Updated: 10/26/23 0708     Glucose 109 mg/dL      Comment: Serial Number: [id number]:  [id number]       Renal Function Panel [[id number]]  (Abnormal) Collected: 10/26/23 0006    Specimen: Blood Updated: 10/26/23 0043     Glucose 120 mg/dL      BUN 17 mg/dL      Creatinine 1.31 mg/dL      Sodium 138 mmol/L      Potassium 4.3 mmol/L      Comment: Slight hemolysis detected by analyzer. Results may be affected.        Chloride 103 mmol/L      CO2 27.0 mmol/L      Calcium 9.1 mg/dL      Albumin 3.7 g/dL      Phosphorus 3.3 mg/dL      Anion Gap 8.0 mmol/L      BUN/Creatinine Ratio 13.0     eGFR 58.9 mL/min/1.73     Narrative:      GFR Normal >60  Chronic Kidney Disease <60  Kidney Failure <15      Lipid Panel [[id number]]  (Abnormal) Collected: 10/26/23 0006    Specimen: Blood Updated: 10/26/23 0043     Total Cholesterol 90 mg/dL      Triglycerides 124 mg/dL      HDL Cholesterol 36 mg/dL      LDL Cholesterol  32 mg/dL      VLDL Cholesterol 22 mg/dL      LDL/HDL Ratio 0.81    Narrative:      Cholesterol Reference Ranges  (U.S. Department of Health and Human Services ATP III  Classifications)    Desirable          <200 mg/dL  Borderline High    200-239 mg/dL  High Risk          >240 mg/dL      Triglyceride Reference Ranges  (U.S. Department of Health and Human Services ATP III Classifications)    Normal           <150 mg/dL  Borderline High  150-199 mg/dL  High             200-499 mg/dL  Very High        >500 mg/dL    HDL Reference Ranges  (U.S. Department of Health and Human Services ATP III Classifications)    Low     <40 mg/dl (major risk factor for CHD)  High    >60 mg/dl ('negative' risk factor for CHD)        LDL Reference Ranges  (U.S. Department of Health and Human Services ATP III Classifications)    Optimal          <100 mg/dL  Near Optimal     100-129 mg/dL  Borderline High  130-159 mg/dL  High             160-189 mg/dL  Very High        >189 mg/dL    CBC (No Diff) [[id number]]  (Abnormal) Collected: 10/26/23 0006    Specimen: Blood Updated: 10/26/23 0019     WBC 9.20 10*3/mm3      RBC 4.04 10*6/mm3      Hemoglobin 12.4 g/dL      Hematocrit 36.8 %      MCV 91.1 fL      MCH 30.8 pg      MCHC 33.8 g/dL      RDW 13.5 %      RDW-SD 45.1 fl      MPV 9.0 fL      Platelets 207 10*3/mm3     POC Glucose Once [[id number]]  (Abnormal) Collected: 10/25/23 2127    Specimen: Blood Updated: 10/25/23 2128     Glucose 115 mg/dL      Comment: Serial Number: [id number]:  [id number]       Protein / Creatinine Ratio, Urine - Urine, Clean Catch [[id number]]  (Abnormal) Collected: 10/25/23 1330    Specimen: Urine, Clean Catch Updated: 10/25/23 1644     Protein/Creatinine Ratio, Urine 527.0 mg/G Crea      Creatinine, Urine 59.2 mg/dL      Total Protein, Urine 31.2 mg/dL     POC Glucose Once [[id number]]  (Abnormal) Collected: 10/25/23 1620    Specimen: Blood Updated: 10/25/23 1622     Glucose 118 mg/dL      Comment: Serial Number: [id number]:  [id number]       POC Activated Clotting Time [[id number]]  (Abnormal) Collected: 10/25/23 1327    Specimen: Arterial Blood Updated: 10/25/23 2943      Activated Clotting Time  179 Seconds      Comment: Serial Number: [id number]:  [id number]       POC Activated Clotting Time [[id number]]  (Abnormal) Collected: 10/25/23 1356    Specimen: Arterial Blood Updated: 10/25/23 1403     Activated Clotting Time  168 Seconds      Comment: Serial Number: [id number]:  [id number]       Sodium, Urine, Random - Urine, Clean Catch [[id number]] Collected: 10/25/23 1332    Specimen: Urine, Clean Catch Updated: 10/25/23 1400     Sodium, Urine 109 mmol/L     Narrative:      Reference intervals for random urine have not been established.  Clinical usage is dependent upon physician's interpretation in combination with other laboratory tests.       Urinalysis With Microscopic If Indicated (No Culture) - Urine, Clean Catch [[id number]]  (Abnormal) Collected: 10/25/23 1330    Specimen: Urine, Clean Catch Updated: 10/25/23 1347     Color, UA Yellow     Appearance, UA Clear     pH, UA 7.5     Specific Gravity, UA 1.054     Glucose, UA Negative     Ketones, UA Negative     Bilirubin, UA Negative     Blood, UA Small (1+)     Protein, UA 30 mg/dL (1+)     Leuk Esterase, UA Negative     Nitrite, UA Negative     Urobilinogen, UA 1.0 E.U./dL    Urinalysis, Microscopic Only - Urine, Clean Catch [[id number]]  (Abnormal) Collected: 10/25/23 1330    Specimen: Urine, Clean Catch Updated: 10/25/23 1347     RBC, UA 3-5 /HPF      WBC, UA 0-2 /HPF      Bacteria, UA None Seen /HPF      Squamous Epithelial Cells, UA 0-2 /HPF      Hyaline Casts, UA None Seen /LPF      Methodology Automated Microscopy    POC Activated Clotting Time [[id number]]  (Abnormal) Collected: 10/25/23 1226    Specimen: Arterial Blood Updated: 10/25/23 1301     Activated Clotting Time  206 Seconds      Comment: Serial Number: [id number]:  [id number]       POC Glucose Once [[id number]]  (Abnormal) Collected: 10/25/23 1105    Specimen: Blood Updated: 10/25/23 1138     Glucose 159 mg/dL      Comment: Serial Number: [id number]:  [id number]        POC Activated Clotting Time [[id number]]  (Abnormal) Collected: 10/25/23 1014    Specimen: Arterial Blood Updated: 10/25/23 1137     Activated Clotting Time  347 Seconds      Comment: Serial Number: [id number]:  [id number]       POC Activated Clotting Time [[id number]]  (Abnormal) Collected: 10/25/23 0934    Specimen: Blood Updated: 10/25/23 1137     Activated Clotting Time  389 Seconds      Comment: Serial Number: [id number]:  [id number]       POC Activated Clotting Time [[id number]]  (Abnormal) Collected: 10/25/23 0914    Specimen: Arterial Blood Updated: 10/25/23 1137     Activated Clotting Time  179 Seconds      Comment: Serial Number: [id number]:  [id number]               Imaging Results (Last 24 Hours)       ** No results found for the last 24 hours. **            Xray reviewed personally by physician.      ECG reviewed personally by physician  ECG/EMG Results (most recent)       Procedure Component Value Units Date/Time    ECG 12 Lead Chest Pain [[id number]] Collected: 10/25/23 0122     Updated: 10/25/23 0824     QT Interval 340 ms      QTC Interval 435 ms     Narrative:      HEART RATE= 98  bpm  RR Interval= 612  ms  MS Interval= 188  ms  P Horizontal Axis= 9  deg  P Front Axis= 67  deg  QRSD Interval= 93  ms  QT Interval= 340  ms  QTcB= 435  ms  QRS Axis= 0  deg  T Wave Axis= 128  deg  - ABNORMAL ECG -  Sinus rhythm  Indeterminate axis  Probable lateral infarct, old  Abnrm T, probable ischemia, anterolateral lds  When compared with ECG of 24-Oct-2023 22:56:28,  No significant change  Electronically Signed By: Alex Billy ([facility]) 25-Oct-2023 08:24:41  Date and Time of Study: 2023-10-25 01:22:00    ECG 12 Lead Chest Pain [[id number]] Collected: 10/24/23 2256     Updated: 10/25/23 0825     QT Interval 348 ms      QTC Interval 414 ms     Narrative:      HEART RATE= 85  bpm  RR Interval= 708  ms  MS Interval= 159  ms  P Horizontal Axis= -22  deg  P Front Axis= 44  deg  QRSD Interval= 99  ms  QT Interval= 348   "ms  QTcB= 414  ms  QRS Axis= -3  deg  T Wave Axis= 108  deg  - ABNORMAL ECG -  Sinus rhythm  Consider anterior infarct  Repol abnrm suggests ischemia, lateral leads  When compared with ECG of 11-Oct-2023 23:54:48,  No significant change  Electronically Signed By: Alex Billy ([facility]) 25-Oct-2023 08:24:56  Date and Time of Study: 2023-10-24 22:56:28    SCANNED - TELEMETRY   [[id number]] Resulted: 10/24/23     Updated: 10/25/23 1620    SCANNED - TELEMETRY   [[id number]] Resulted: 10/24/23     Updated: 10/25/23 2207    SCANNED - TELEMETRY   [[id number]] Resulted: 10/24/23     Updated: 10/26/23 0341    ECG 12 Lead Chest Pain [[id number]] Collected: 10/26/23 0524     Updated: 10/26/23 0526     QT Interval 371 ms      QTC Interval 422 ms     Narrative:      HEART RATE= 78  bpm  RR Interval= 772  ms  VA Interval= 155  ms  P Horizontal Axis= 6  deg  P Front Axis= 76  deg  QRSD Interval= 99  ms  QT Interval= 371  ms  QTcB= 422  ms  QRS Axis= 46  deg  T Wave Axis= 100  deg  - ABNORMAL ECG -  Sinus rhythm  Low voltage, extremity leads  Nonspecific T abnormalities, lateral leads  Electronically Signed By:   Date and Time of Study: 2023-10-26 05:24:48    SCANNED - TELEMETRY   [[id number]] Resulted: 10/24/23     Updated: 10/26/23 0538    SCANNED - TELEMETRY   [[id number]] Resulted: 10/24/23     Updated: 10/26/23 0816              PHYSICAL EXAMINATION:  VITAL SIGNS: Temp:  [97.4 °F (36.3 °C)-98.7 °F (37.1 °C)] 97.9 °F (36.6 °C)  Heart Rate:  [72-98] 86  Resp:  [12-21] 21  BP: ()/() 181/81   Flowsheet Rows      Flowsheet Row First Filed Value   Admission Height 182.9 cm (72\") Documented at 10/24/2023 2252   Admission Weight 137 kg (301 lb 5.9 oz) Documented at 10/24/2023 2252          GENERAL: Alert and oriented x3, afebrile. Vital signs stable, appeared comfortable, nondistressed, and appears stated age. Generalized weakness. Responds to questions appropriately.   HEENT: Normocephalic, atraumatic. Pupils equal, round and " reactive to light. Extraocular movements intact bilaterally. Trachea midline.  Mucous membranes are moist.   NECK: Supple. No JVD. Trachea midline, no LAD  CHEST: Clear to auscultation bilaterally anteriorly; no rale, rhonchi, or wheezes  HEART: Regular rate and rhythm. No rubs, murmurs or gallops.   ABDOMEN: Soft, obese, nontender, nondistended. Bowel sounds are otherwise positive.   EXTREMITIES: No clubbing, cyanosis, or edema. Moves all extremities, groin tender but stable   SKIN: Warm and dry. No ecchymoses, petechiae or rashes.   MUSCULOSKELETAL: No bony abnormalities. Range of motion normal. No crepitus. No joint swelling or erythema.   NEUROLOGIC: Cranial nerves II-XII intact bilaterally. No focal neurologic deficits. Mini-Mental status exam was deferred.   LYMPHATICS: No lymphadenopathy.     ASSESSMENT AND PLAN:     NSTEMI, CAD, h/o CABG  S/p PCI LDA/LCX/LM  DAPT ASA brillenta 6 months then change to plavix indefiately  High intensity statin  BB and afterload reduction    Creat better after IVF s/p contrast    Ok to d/c today on present meds  F/u cardiology 2 weeks    Meds reconciled    Staged procedure to 2nd OM if cont to have CP        Dev Lawton MD  10/26/23  08:19 EDT

## 2023-10-26 NOTE — CASE MANAGEMENT/SOCIAL WORK
Discharge Planning Assessment   Haresh     Patient Name: Jamil Self  MRN: [id number]  Today's Date: 10/26/2023    Admit Date: 10/24/2023    Plan: Anticipate routine home with family. Brilinta copay $0 per [facility].   Discharge Needs Assessment       Row Name 10/26/23 1057       Living Environment    People in Home spouse    Current Living Arrangements home    Primary Care Provided by self    Provides Primary Care For no one    Family Caregiver if Needed spouse    Quality of Family Relationships helpful    Able to Return to Prior Arrangements yes       Resource/Environmental Concerns    Resource/Environmental Concerns none    Transportation Concerns none       Transition Planning    Patient/Family Anticipates Transition to home with family    Patient/Family Anticipated Services at Transition none    Transportation Anticipated family or friend will provide       Discharge Needs Assessment    Readmission Within the Last 30 Days no previous admission in last 30 days    Equipment Currently Used at Home glucometer    Concerns to be Addressed denies needs/concerns at this time    Anticipated Changes Related to Illness none    Equipment Needed After Discharge none                   Discharge Plan       Row Name 10/26/23 1057       Plan    Plan Anticipate routine home with family. Brilinta copay $0 per [facility].    Patient/Family in Agreement with Plan yes    Plan Comments CM met with patient at bedside. Patient lives with spouse, is independent with ADLs and drives. PCP and pharmacy verified-denies any difficulty affording meds. Patient denies any d/c needs at this time and sister-in-law will transport at d/c. CM contacted [facility], Brilinta copay $0.    Final Discharge Disposition Code 01 - home or self-care    Final Note Home                      Expected Discharge Date and Time       Expected Discharge Date Expected Discharge Time    Oct 26, 2023            Demographic Summary       Row Name 10/26/23 1057       General  Information    Admission Type inpatient    Arrived From procedure suite    Referral Source admission list    Reason for Consult discharge planning    Preferred Language English       Contact Information    Permission Granted to Share Info With                    Functional Status       Row Name 10/26/23 1057       Functional Status    Usual Activity Tolerance good    Current Activity Tolerance good       Functional Status, IADL    Medications independent    Meal Preparation independent    Housekeeping independent    Laundry independent    Shopping independent       Mental Status    General Appearance WDL WDL       Mental Status Summary    Recent Changes in Mental Status/Cognitive Functioning no changes                  Case Management Discharge Note      Final Note: Home         Selected Continued Care - Admitted Since 10/24/2023             Transportation Services  Private: Car    Final Discharge Disposition Code: 01 - home or self-care    Met with patient in room.  Maintained distance greater than six feet and spent less than 15 minutes in the room.   ANUM MurphyN, [place] [address]    Office: [phone]  Fax: [phone]

## 2023-10-30 ENCOUNTER — TELEPHONE (OUTPATIENT)
Dept: CARDIOLOGY | Facility: CLINIC | Age: 68
End: 2023-10-30

## 2023-10-30 ENCOUNTER — TRANSCRIBE ORDERS (OUTPATIENT)
Dept: CARDIAC REHAB | Facility: HOSPITAL | Age: 68
End: 2023-10-30
Payer: COMMERCIAL

## 2023-10-30 ENCOUNTER — TELEPHONE (OUTPATIENT)
Dept: CARDIAC REHAB | Facility: HOSPITAL | Age: 68
End: 2023-10-30
Payer: COMMERCIAL

## 2023-10-30 DIAGNOSIS — Z95.5 S/P DRUG ELUTING CORONARY STENT PLACEMENT: Primary | ICD-10-CM

## 2023-10-30 NOTE — TELEPHONE ENCOUNTER
Caller: Jamil Self    Relationship: Self    Best call back number: [phone]    What form or medical record are you requesting: PT NEEDS A NOTE FOR WORK STATING DR. ROQUE IS RECOMMENDING CARDIAC REHAB. PT IS INTERESTED BUT NEEDS TO HAVE SOMETHING SO HE CAN TAKE OFF.     Who is requesting this form or medical record from you: EMPLOYER     How would you like to receive the form or medical records (pick-up, mail, fax):    Timeframe paperwork needed: ASAP

## 2023-10-31 ENCOUNTER — READMISSION MANAGEMENT (OUTPATIENT)
Dept: CALL CENTER | Facility: HOSPITAL | Age: 68
End: 2023-10-31
Payer: COMMERCIAL

## 2023-10-31 NOTE — PROGRESS NOTES
"Enter Query Response Below      Query Response: Acute kidney injury on chronic kidney disease stage III             If applicable, please update the problem list.     Patient: Jamil Self        : 1955  Account: [id number]           Admit Date: 10/24/2023        How to Respond to this query:       a. Click New Note     b. Answer query within the yellow box.                c. Update the Problem List, if applicable.      If you have any questions about this query contact me at: [email or web address]     Dr. Carvajal    67-year-old male with history of \"chronic kidney disease stage IIIa.\", \"Baseline creatinine seems to be around 1.3-1.4 Mg/DL.  Creatinine slightly higher than baseline.\" per Nephrology consult. The H&P notes \"Acute kidney injury on chronic kidney disease\".  Creatinine- 1.69 (10/24), 1.31 (10/26). 10/26 nephrology progress note \"creatinine improved to 1.3 mg/TF with IV hydration.\"    After study, was acute kidney injury (ELY) clinically supported during this admission?  Acute kidney injury (ELY) was supported with additional clinical indicators:____________  Acute kidney injury (ELY) was not supported, CKD 3a only  Other- specify_____________  Unable to determine     By submitting this query, we are merely seeking further clarification of documentation to accurately reflect all conditions that you are monitoring, evaluating, treating or that extend the hospitalization or utilize additional resources of care. Please utilize your independent clinical judgment when addressing the question(s) above.     This query and your response, once completed, will be entered into the legal medical record.    Sincerely,  Mandy Field MSN, RN, CCDS  Clinical Documentation Integrity Program     ELY is defined by KDIGO as any of the following:  Increase in creatinine > 0.3 mg/dl within 48 hours or less; or   Increase in creatinine level to > 1.5x baseline (historical or measure), which is known or presumed " to have occurred within the prior 7 days   Urine volume <0.5 ml/kg/h for 6 hours.  Reference article: http://www.kdigo.org/clinical_practice_guidelines/pdf/KDIGO%20AKI%20Guideline.pdf (as published in [employer] Supplements, The Official Journal of the International Society of Nephrology, vol. 2, issue 1, March 2012.)

## 2023-10-31 NOTE — PROGRESS NOTES
"Enter Query Response Below      Query Response: ]    \"NSTEMI, chest pain unstable angina culprit distal left main compromising flow to a unbypassed circumflex distribution, also ostial LAD compromising septal and proximal diagonal branch status post intervention\", \"ostial LAD 90% to 95% with haziness \"    This is the definition of type 1 MI      I cannot be more clear in the documentation       If applicable, please update the problem list.     Patient: Jamil Self        : 1955  Account: [id number]           Admit Date: 10/24/2023        How to Respond to this query:       a. Click New Note     b. Answer query within the yellow box.                c. Update the Problem List, if applicable.      If you have any questions about this query contact me at: [email or web address]     Dr. Lawton    67-year-old male with history of CAD, PCI, and CABG admitted 10/24/23 with \"NSTEMI, chest pain unstable angina culprit distal left main compromising flow to a unbypassed circumflex distribution, also ostial LAD compromising septal and proximal diagonal branch status post intervention\", \"ostial LAD 90% to 95% with haziness,\" per the cardiac cath note.     Please clarify the type of NSTEMI the patient was treated/monitored for:  Type 1 MI due to ______(please specify- plaque erosion, rupture, fissure or thrombus)  Type 2 MI due to demand ischemia   Other- specify______  Unable to determine    By submitting this query, we are merely seeking further clarification of documentation to accurately reflect all conditions that you are monitoring, evaluating, treating or that extend the hospitalization or utilize additional resources of care. Please utilize your independent clinical judgment when addressing the question(s) above.     This query and your response, once completed, will be entered into the legal medical record.    Sincerely,  Mandy Field MSN, RN, CCDS  Clinical Documentation Integrity Program     "

## 2023-10-31 NOTE — OUTREACH NOTE
[facility] Week 1 Survey      Flowsheet Row Responses   [facility] patient discharged from? [facility]   Does the patient have one of the following disease processes/diagnoses(primary or secondary)? Acute MI (STEMI,NSTEMI)   Week 1 attempt successful? Yes   Call start time 1255   Call end time 1259   Discharge diagnosis NSTEMI (non-ST elevated myocardial infarction)   Meds reviewed with patient/caregiver? Yes   Is the patient having any side effects they believe may be caused by any medication additions or changes? No   Does the patient have all prescriptions related to this admission filled (includes statins,anticoagulants,HTN meds,anti-arrhythmia meds) Yes   Is the patient taking all medications as directed (includes completed medication regime)? Yes   Does the patient have a primary care provider?  Yes   Does the patient have an appointment with their PCP,cardiologist,or clinic within 7 days of discharge? Yes   Has the patient kept scheduled appointments due by today? N/A   Psychosocial issues? No   Did the patient receive a copy of their discharge instructions? Yes   Nursing interventions Reviewed instructions with patient   What is the patient's perception of their health status since discharge? Improving   Nursing interventions Nurse provided patient education   Is the patient/caregiver able to teach back signs and symptoms of when to call for help immediately: Sudden chest discomfort, Nausea or vomiting, Sudden sweating or clammy skin, Shortness of breath at any time, Sudden discomfort in arms, back, neck or jaw, Irregular or rapid heart rate, Dizziness or lightheadedness   Nursing interventions Nurse provided patient education   Is the pateint /caregiver able to teach back the importance of cardiac rehab? Yes   Is the patient/caregiver able to teach back lifestyle changes to help prevent MIs Regular exercise as approved by provider, Heart healthy diet, Maintaining a healthy weight, Reducing stress, Limiting  alcohol intake   Is the patient/caregiver able to teach back ways to prevent a second heart attack: Take medications, Follow up with MD   If the patient is a current smoker, are they able to teach back resources for cessation? Not a smoker   Is the patient/caregiver able to teach back the hierarchy of who to call/visit for symptoms/problems? PCP, Specialist, Home health nurse, Urgent Care, ED, 911 Yes   Additional teach back comments States he is doing well.  Cardiac rehab contacted him and  once he get insurance approval he will be starting.   Week 1 call completed? Yes   Revoked No further contact(revokes)-requires comment   Graduated/Revoked comments Denies questions or needs at this time.   Call end time [id number]            Brigette CHOW - Licensed Nurse

## 2023-11-02 ENCOUNTER — TELEPHONE (OUTPATIENT)
Dept: CARDIAC REHAB | Facility: HOSPITAL | Age: 68
End: 2023-11-02
Payer: COMMERCIAL

## 2023-11-08 ENCOUNTER — TELEPHONE (OUTPATIENT)
Dept: CARDIAC REHAB | Facility: HOSPITAL | Age: 68
End: 2023-11-08
Payer: COMMERCIAL

## 2023-11-15 ENCOUNTER — OFFICE VISIT (OUTPATIENT)
Dept: CARDIOLOGY | Facility: CLINIC | Age: 68
End: 2023-11-15
Payer: COMMERCIAL

## 2023-11-15 VITALS
RESPIRATION RATE: 18 BRPM | HEIGHT: 72 IN | HEART RATE: 77 BPM | DIASTOLIC BLOOD PRESSURE: 70 MMHG | BODY MASS INDEX: 39.82 KG/M2 | SYSTOLIC BLOOD PRESSURE: 158 MMHG | WEIGHT: 294 LBS

## 2023-11-15 DIAGNOSIS — I25.10 CORONARY ARTERY DISEASE INVOLVING NATIVE CORONARY ARTERY OF NATIVE HEART, UNSPECIFIED WHETHER ANGINA PRESENT: Primary | ICD-10-CM

## 2023-11-15 PROCEDURE — 99214 OFFICE O/P EST MOD 30 MIN: CPT | Performed by: INTERNAL MEDICINE

## 2023-11-15 PROCEDURE — 93000 ELECTROCARDIOGRAM COMPLETE: CPT | Performed by: INTERNAL MEDICINE

## 2023-11-15 RX ORDER — ATORVASTATIN CALCIUM 40 MG/1
40 TABLET, FILM COATED ORAL DAILY
Qty: 30 TABLET | Refills: 11 | Status: SHIPPED | OUTPATIENT
Start: 2023-11-15

## 2023-11-15 RX ORDER — HYDRALAZINE HYDROCHLORIDE 50 MG/1
50 TABLET, FILM COATED ORAL 2 TIMES DAILY
Qty: 60 TABLET | Refills: 5 | Status: SHIPPED | OUTPATIENT
Start: 2023-11-15

## 2023-11-19 LAB
QT INTERVAL: 371 MS
QTC INTERVAL: 422 MS

## 2023-11-20 ENCOUNTER — TELEPHONE (OUTPATIENT)
Dept: CARDIOLOGY | Facility: CLINIC | Age: 68
End: 2023-11-20

## 2023-11-20 NOTE — TELEPHONE ENCOUNTER
Caller: Jamil Self    Relationship: Self    Best call back number:[phone]    What form or medical record are you requesting: GOVERNMENT LEAVE ACT    Who is requesting this form or medical record from you: EMPLOYER    How would you like to receive the form or medical records (pick-up, mail, fax): FAX  If fax, what is the fax number: LISTED ON THE DOCUMENT    Timeframe paperwork needed: ASAP    Additional notes: PATIENT SAID HE DROPPED OFF PAPERWORK ON 11/10/23 AND HASN'T RECEIVED IT BACK OR HAD US CALL HIM ABOUT IT.

## 2023-11-22 ENCOUNTER — TELEPHONE (OUTPATIENT)
Dept: CARDIAC REHAB | Facility: HOSPITAL | Age: 68
End: 2023-11-22
Payer: COMMERCIAL

## 2023-11-27 ENCOUNTER — OFFICE VISIT (OUTPATIENT)
Dept: CARDIAC REHAB | Facility: HOSPITAL | Age: 68
End: 2023-11-27
Payer: COMMERCIAL

## 2023-11-27 DIAGNOSIS — I21.4 NSTEMI, INITIAL EPISODE OF CARE: Primary | ICD-10-CM

## 2023-11-27 PROCEDURE — 93798 PHYS/QHP OP CAR RHAB W/ECG: CPT

## 2023-11-28 ENCOUNTER — TREATMENT (OUTPATIENT)
Dept: CARDIAC REHAB | Facility: HOSPITAL | Age: 68
End: 2023-11-28
Payer: COMMERCIAL

## 2023-11-28 ENCOUNTER — APPOINTMENT (OUTPATIENT)
Dept: CARDIAC REHAB | Facility: HOSPITAL | Age: 68
End: 2023-11-28
Payer: COMMERCIAL

## 2023-11-28 DIAGNOSIS — I21.4 NSTEMI, INITIAL EPISODE OF CARE: Primary | ICD-10-CM

## 2023-11-28 PROCEDURE — 93798 PHYS/QHP OP CAR RHAB W/ECG: CPT

## 2023-11-28 NOTE — PROGRESS NOTES
See scanned session report. First full exercise session. Education/demonstration on stretches and weights. CHRISTIANO Mason

## 2023-11-29 ENCOUNTER — APPOINTMENT (OUTPATIENT)
Dept: CARDIAC REHAB | Facility: HOSPITAL | Age: 68
End: 2023-11-29
Payer: COMMERCIAL

## 2023-12-04 ENCOUNTER — TREATMENT (OUTPATIENT)
Dept: CARDIAC REHAB | Facility: HOSPITAL | Age: 68
End: 2023-12-04
Payer: COMMERCIAL

## 2023-12-04 DIAGNOSIS — I21.4 NSTEMI, INITIAL EPISODE OF CARE: Primary | ICD-10-CM

## 2023-12-04 PROCEDURE — 93798 PHYS/QHP OP CAR RHAB W/ECG: CPT

## 2023-12-04 NOTE — PROGRESS NOTES
Cardiology Clinic Note  Dev Lawton MD, PhD    Subjective:     Encounter Date:11/15/2023      Patient ID: Jamil Self is a 68 y.o. male.    Chief Complaint:  Chief Complaint   Patient presents with    Hospital Follow Up Visit       HPI:  I the pleasure to see this 68-year-old gentleman back in clinic today with cardiac history of coronary artery disease status post bypass with LIMA to LAD, SVG to RCA and marginal branch historically, drug-eluting stent SVG to RCA previously, history of preserved EF 57% by last 2D echo, essential hypertension hyperlipidemia diabetes obesity obstructive sleep apnea and tobacco abuse.  Even more recently he underwent cardiac catheterization January 2023 with PCI distal LAD tandem lesions distal to the anastomosis status post overlapping 2.25 x 12 and 2.0 x 26 drug-eluting stents postdilated aggressively, heavy circumflex calcific disease with closed SVG to circumflex but with RYLIE-3 flow to the obtuse marginal branches that were managed medically.  SVG to RCA was widely patent, EF 50 to 55% with chronic inferior wall motion abnormality.  He has been hospitalized multiple times over the last 6 months including recently in October 2023 for unstable angina.  Repeat left heart catheterization yet again revealed progression of disease in the left main and proximal LAD compromising flow to the diagonals with unstable hazy lesion in distal left main, LAD at that time.  He underwent PCI with Xience 2.5 x 15 drug-eluting stent ostial proximal LAD postdilated 3.0 at high pressure, PCI Xience 2.5 x 23 ostial proximal circumflex to the ostial left main postdilated 3.25 at high pressure in the circumflex and 3.5 at 20 justin in the left main, complete treatment of left main disease for reestablishment of flow to the circumflex with close bypass to that distribution.  He is doing well on repeat follow-up today, slightly hypertensive but blood pressure at home seems to be better controlled less  "than 140 systolic.  No unstable angina since hospitalization, no heart failure signs or symptoms and remains with chronic stable dyspnea on exertion.    Review of systems otherwise negative x14 point review of systems except as mentioned above  Historical data copied forward from previous encounters in EMR including the history, exam, and assessment/plan has been reviewed and is unchanged unless noted otherwise.    Cardiac medicines reviewed with risk, benefits, and necessity of each discussed.    Risk and benefit of cardiac testing reviewed including death heart attack stroke pain bleeding infection need for vascular /cardiovascular surgery were discussed and the patient     Objective:         /70 (BP Location: Right arm, Patient Position: Sitting)   Pulse 77   Resp 18   Ht 182.9 cm (72\")   Wt 133 kg (294 lb)   BMI 39.87 kg/m²     Physical Exam  Regular rate and rhythm no rubs gallops heave or lift, 1 out of 6 systolic ejection murmur left renal border  Obese soft nontender nondistended  Clear to auscultation with delayed expiratory phase  Skin is warm and dry  Normal radial pulses normal cap refill  Intact grossly  Assessment:       Coronary artery disease of native and bypass grafts  Recent PCI in the last 1 year, negative distal LAD overlapping stents, proximal ostial LAD as well as proximal ostial circumflex back to the ostium of the left main  History of recurrent unstable angina  Tobacco abuse  Essential hypertension  Hyperlipidemia  Morbid obesity admit  Obstructive sleep apnea  Comorbidity of diabetes    Diet and exercise per AHA guidelines  Secondary prevention goals  Continue dual antiplatelet therapy with aspirin and ticagrelor  Beta-blocker with Coreg  Antianginals with amlodipine isosorbide mononitrate  Antihypertensives with losartan as well as after mentioned medicines  Goal blood pressure less than 130 systolic optimally  Increase atorvastatin to 40 daily, follow-up fasting lipid panel if " not less than 55 with respect to LDL would increase to 80 daily or add Zetia at this time  Continue smoking abstinence    Back to clinic 3 months  Cardiac rehab referral    There are no diagnoses linked to this encounter.       Plan:              The pleasure to be involved in this patient's cardiovascular care.  Please call with any questions or concerns  Dev Lawton MD, PhD    Most recent EKG as reviewed and interpreted by me:    ECG 12 Lead    Date/Time: 11/15/2023 9:49 AM  Performed by: Dev Lawton MD    Authorized by: Dev Lawton MD  Comparison: not compared with previous ECG   Previous ECG: no previous ECG available  Rhythm: sinus rhythm  Ectopy: unifocal PVCs and bigeminy  Rate: normal  Other findings: non-specific ST-T wave changes    Clinical impression: abnormal EKG           Most recent echo as reviewed and interpreted by me:      Most recent stress test as reviewed and interpreted by me:  Results for orders placed during the hospital encounter of 01/20/23    Stress Test With Myocardial Perfusion (1 Day)    Interpretation Summary    Left ventricular ejection fraction is normal (Calculated EF = 66%).    Myocardial perfusion imaging indicates a small-sized, mildly severe area of ischemia located in the anterior wall and septal wall.    Impressions are consistent with a high risk study.    This is Cardiolite imaging stress test with small amount of distal anterior wall and apical ischemia.  No myocardial infarction noted.  Left ventricular size and function was normal.    Findings consistent with an indeterminate ECG stress test.      Most recent cardiac catheterization as reviewed interpreted by me:  Results for orders placed during the hospital encounter of 10/24/23    Cardiac Catheterization/Vascular Study    Narrative  Primary operative Dev Lawton MD, PhD  [facility] cardiology  Date of service 10-    Procedure  1.  Left heart catheterization coronary  angiography left ventriculography in AMARAL position with imaging of native vessels and bypass grafts  2.,  Percutaneous coronary invention with balloon angioplasty of the distal left main, ostial proximal LAD and ostial proximal circumflex  3.  PCI with Xience 2.5 x 15 drug-eluting stent to the ostial proximal LAD postdilated 3.0 at 18 justin reducing stenosis from greater than 95% to 0% residual  4.  Percutaneous coronary invention PCI with Xience 2.5 x 23 Xience drug-eluting stent ostial proximal circumflex back to the ostial left main postdilated 3.0-3.2 distally avoiding the distal edge and the circumflex ostium and 3.5 x 8 NC balloon at 18 justin in the left main back to the ostium, reduction of stenosis less than 2% residual at the ostium of the circumflex, reduction of 80% stenosis to 90% stenosis in the distal left main to 0% residual  RYLIE II flow preprocedurally, RYLIE-3 flow post procedurally    Indication  Non-STEMI, chest pain    After informed consent patient brought to the Cath Lab sterilely prepped and draped in usual fashion expose the right groin for right common femoral arterial access via micropuncture and modified Seldinger technique placement of a 6 Norwegian sheath initially.  Only 5 guidewire was advanced aortic valve followed by diagnostic JL 4 and JR4 catheters for selective left and right coronary angiography respectively.  JR4 was used to cross aortic valve followed by hand-injection for LV gram EDP assessment and pullback assessment of the transaortic gradient.  JR4 was used to used to image the LIMA to LAD, multipurpose catheter was used to and the SVG to RCA, SVG to obtuse marginal known to be  previously.  SVG to RCA widely patent unchanged from January angiography, LIMA to LAD widely patent with prior mid LAD to distal LAD stents previously placed also widely patent with no in-stent restenosis and RYLIE-3 flow.  Distal left main greater than 95% with compromise of the ostial proximal LAD and  ostial proximal circumflex which was unbypassed with  of the SVG to obtuse marginal branch which look to have progression since prior cath in January and intervention to the LAD.  Patient heparinized with 100 units/kg of heparin for ACT greater than 250.  Patient was on background therapy of aspirin and Plavix, he was given Integrilin in the ER.  He was on a nitroglycerin drip as well for uncontrolled hypertension.  A 7 Macedonian sheath was upsized, 7 Macedonian EBU 4.0 guide to engage the left main followed by run-through wire to the circumflex and obtuse marginal branch, BMW wire to the LAD into a diagonal branch.  2.5 x 15 balloon to the LAD and a 2.25 x 15 balloon to the ostial proximal circumflex with balloon angioplasty with both independently at 16 to 18 justin followed by kissing balloon angioplasty, a 2.5 x 23 Xience drug-eluting stent was positioned in the ostial proximal circumflex back to the left main and a 2.5 x 15 Xience drug-eluting stent in the ostial proximal LAD given  just after the first septal  with widely patent LIMA to LAD.  We chose the circumflex is a dominant branch in this clinical situation.  The LAD stent was deployed at 10 to 12 justin followed by pullback of the stent balloon 2-3 struts with further postdilatation at 20 to 22 justin with the stent balloon, reduction of stenosis to 0% residual also further balloon angioplasty of the distal left main.  Next the circumflex stent was deployed back to the ostium of the left main at 12 justin on 2 inflations followed by pullback of the stent balloon 2-3 struts and further postdilatation at 22 justin with reduction of stenosis to less than 20% residual.  There was some mild recoil at the ostium of the circumflex.  A 3.5 x 8 NC balloon was used to post dilate the left main at 20 justin and distal to proximal fashion, there was no residual ostial LAD disease with RYLIE-3 flow distally, singh wire was placed to the circumflex followed by further  postdilatation of the ostial proximal segment with 2.75 x 12 at 22 justin for 1 minute with great angiographic improvement of the recoil and reduction of stenosis less than 10% residual.  There was RYLIE-3 flow with great angiographic appearance of the obtuse marginal branch 1, OM 2 had significant proximal disease but very small caliber with slightly sub-RYLIE-3 flow but given small vessel disease with left be medically managed in this clinical scenario with staged intervention if he continued to have chest pain or shortness of breath.  After final angiography with no distal wire trauma edge dissection, widely patent distal left main into LAD and circumflex proximally as well as the proximal obtuse marginal branch and recurrent left atrial branch the procedure was concluded.  ACT on finishing case was greater than 250, patient was loaded with Brilinta on the table Integrilin was discontinued, nitro drip and Versed drip was continued.  Patient with GERD chest pain-free hemodynamically electrically stable alert talking to staff neurologically gross intact bilaterally    Complications none  Blood loss less than 20 cc  Sedation time of 1 hour 15 minutes  Contrast 220 cc total    Findings  1.  Opening aortic pressure of 154/73 with a mean of 120, closing pressure 148/83 with a mean of 112  2.  LV pressure 143/5 with an EDP of 15-17  3.  Normal transaortic valve gradient on pullback      Angiography  1.  Left main 4 normal LV systolic function 55 to 60% normal takeoff, diffusely diseased, 90% distally with haziness  2.  The ostial LAD 90% to 95% with haziness, for septal  takes off and also a small caliber diagonal branch, the LAD is  thereafter, widely patent LIMA to LAD with retrograde perfusion to more proximal septals although does not perfuse any further diagonal branches, distally there is overlapping widely patent stents with no in-stent restenosis as the LAD wraps to around the apex  3.  Ostial circumflex  99% with RYLIE II flow, there is diffuse small vessel disease distally in the first obtuse marginal branch 60 to 70% less than 2 mm in diameter throughout its course, second obtuse marginal branch has proximal greater than 90% stenosis but has RYLIE II flow distally as it wraps around the inferolateral wall and has diffuse small vessel disease 60 to 70% as well, this can be staged in the future if patient continues have chest pain.  It appears to be angiographically unchanged from prior January angiographic appearance  4.  RCA , widely patent SVG to distal RCA with distal diffuse disease which is angiographically unchanged in the PLV and PDA with no anastomotic disease  5.  SVG to circumflex/obtuse marginal   6.  SVG to RCA widely patent  7.  LIMA to LAD normal takeoff widely patent throughout course with no disease    Conclusions recommendations  1.  NSTEMI, chest pain unstable angina culprit distal left main compromising flow to a unbypassed circumflex distribution, also ostial LAD compromising septal and proximal diagonal branch status post intervention with 2.5 x 15 to the LAD postdilated 3.0 high-pressure with 2.5 x 23 2 the circumflex back to ostial left main given  of the LAD distally postdilated 3.0 distally at high pressure and 3.5 in the left main back to the ostium with flaring of the ostial portion of the stent at 22 justin with great angiographic results of the intervened upon segment.  2.  Residual small vessel disease in the second obtuse marginal branch proximally which can be staged at a later date if he continues to have chest pain or unstable angina  3.  Aspirin Brilinta, high intensity statin, afterload reduction for goal blood pressure less than 135 systolic, will admit to the hospital further optimization    Further recommendation follow findings and clinical course    Dev Lawton MD, PhD    The following portions of the patient's history were reviewed and updated as appropriate:  allergies, current medications, past family history, past medical history, past social history, past surgical history, and problem list.      ROS:  14 point review of systems negative except as mentioned above    Current Outpatient Medications:     amLODIPine (NORVASC) 10 MG tablet, Take 1 tablet by mouth Daily., Disp: , Rfl:     aspirin 81 MG EC tablet, Take 1 tablet by mouth Daily., Disp: 30 tablet, Rfl: 0    carvedilol (COREG) 6.25 MG tablet, Take 1 tablet by mouth 2 (Two) Times a Day., Disp: 60 tablet, Rfl: 5    furosemide (LASIX) 20 MG tablet, Take 1 tablet by mouth Daily., Disp: , Rfl:     isosorbide mononitrate (IMDUR) 30 MG 24 hr tablet, Take 2 tablets by mouth Daily., Disp: 90 tablet, Rfl: 3    losartan (COZAAR) 100 MG tablet, Take 1 tablet by mouth Daily., Disp: , Rfl:     metFORMIN (GLUCOPHAGE) 1000 MG tablet, Take 0.5 tablets by mouth 2 (Two) Times a Day With Meals., Disp: , Rfl:     ticagrelor (BRILINTA) 90 MG tablet tablet, Take 1 tablet by mouth 2 (Two) Times a Day., Disp: 60 tablet, Rfl: 6    atorvastatin (LIPITOR) 40 MG tablet, Take 1 tablet by mouth Daily., Disp: 30 tablet, Rfl: 11    hydrALAZINE (APRESOLINE) 50 MG tablet, Take 1 tablet by mouth 2 (Two) Times a Day., Disp: 60 tablet, Rfl: 5    Problem List:  Patient Active Problem List   Diagnosis    Essential hypertension    Diabetes mellitus    Mixed hyperlipidemia    Tobacco abuse    Coronary artery disease involving native coronary artery of native heart without angina pectoris    LIZETTE (obstructive sleep apnea)    Obesity (BMI 30-39.9)    CKD (chronic kidney disease) stage 3, GFR 30-59 ml/min    Acute bronchitis due to Rhinovirus    Controlled type 2 diabetes mellitus without complication, without long-term current use of insulin    Dyslipidemia    Hx of CABG    Tobacco dependence    Hyponatremia    Abnormal stress test    Unstable angina    NSTEMI (non-ST elevated myocardial infarction)     Past Medical History:  Past Medical History:    Diagnosis Date    Coronary artery disease     Diabetes mellitus     Hyperlipidemia     Hypertension     LIZETTE (obstructive sleep apnea)      Past Surgical History:  Past Surgical History:   Procedure Laterality Date    CARDIAC CATHETERIZATION N/A 2021    Procedure: Left Heart Cath;  Surgeon: Jourdan Pillai MD;  Location: [facility] CATH INVASIVE LOCATION;  Service: Cardiology;  Laterality: N/A;    CARDIAC CATHETERIZATION N/A 2023    Procedure: Left Heart Cath;  Surgeon: Dev Lawton MD;  Location: [facility] CATH INVASIVE LOCATION;  Service: Cardiology;  Laterality: N/A;    CARDIAC CATHETERIZATION N/A 10/25/2023    Procedure: Left Heart Cath;  Surgeon: Dev Lawton MD;  Location: [facility] CATH INVASIVE LOCATION;  Service: Cardiology;  Laterality: N/A;    CAROTID STENT      CORONARY ARTERY BYPASS GRAFT      , by patient's guess    CORONARY STENT PLACEMENT       Social History:  Social History     Socioeconomic History    Marital status:    Tobacco Use    Smoking status: Former     Packs/day: 1.00     Years: 40.00     Additional pack years: 0.00     Total pack years: 40.00     Types: Cigarettes     Quit date: 10/20/2023     Years since quittin.1    Smokeless tobacco: Never   Vaping Use    Vaping Use: Never used   Substance and Sexual Activity    Alcohol use: Never    Drug use: Never    Sexual activity: Defer     Allergies:  No Known Allergies  Immunizations:  There is no immunization history for the selected administration types on file for this patient.         In-Office Procedure(s):  No orders to display        ASCVD RIsk Score::  The ASCVD Risk score (Clay DK, et al., 2019) failed to calculate for the following reasons:    The patient has a prior MI or stroke diagnosis    Imaging:    Results for orders placed during the hospital encounter of 10/24/23    XR Chest 1 View    Narrative  XR CHEST 1 VW    Date of Exam: 10/24/2023 11:15 PM EDT    Indication: chest  pain    Comparison: Chest radiograph dated October 12, 2023    Findings:  The heart is enlarged with changes of midline sternotomy and coronary artery bypass grafting. The pulmonary vascular markings are normal. The lungs are clear.    Impression  Impression:  No active disease      Electronically Signed: Ezequiel Hedrick MD  10/24/2023 11:30 PM EDT  Workstation ID: [id number]       Results for orders placed during the hospital encounter of 12/21/21    US Renal Bilateral    Narrative  Examination: US RENAL BILATERAL-    Date of Exam: 12/23/2021 10:54 AM    Indication: Acute kidney injury; J20.6-Acute bronchitis due to  Rhinovirus; J96.01-Acute respiratory failure with hypoxia;  Z20.822-Contact with and (suspected) exposure to covid-19.    Comparison: None available.    Technique: Grayscale and color Doppler ultrasound evaluation of the  kidneys and urinary bladder was performed    Findings:  The right kidney measures 12.1 x 7.1 x 5.3 cm and the left kidney  measures 10.4 x 4.6 x 4.6 cm. Kidney echogenicity and vascularity appear  within normal limits. There is no solid kidney mass.  No echogenic  shadowing stone.  No hydronephrosis.      Limited visualization of the urinary bladder is unremarkable.    Impression  Unremarkable renal ultrasound with no evidence of hydronephrosis.    Electronically Signed By-Ezequiel Hedrick MD On:12/23/2021 11:47 AM  This report was finalized on [id number] by  Ezequiel Hedrick MD.          Lab Review:   Admission on 10/24/2023, Discharged on 10/26/2023   Component Date Value    QT Interval 10/24/2023 348     QTC Interval 10/24/2023 414     Extra Tube 10/24/2023 hold for add-on     Extra Tube 10/24/2023 Hold for add-ons.     Extra Tube 10/24/2023 Hold for add-ons.     Glucose 10/24/2023 205 (H)     BUN 10/24/2023 20     Creatinine 10/24/2023 1.69 (H)     Sodium 10/24/2023 140     Potassium 10/24/2023 4.1     Chloride 10/24/2023 100     CO2 10/24/2023 28.0     Calcium 10/24/2023 10.0     Total  Protein 10/24/2023 7.6     Albumin 10/24/2023 4.9     ALT (SGPT) 10/24/2023 9     AST (SGOT) 10/24/2023 7     Alkaline Phosphatase 10/24/2023 93     Total Bilirubin 10/24/2023 0.3     Globulin 10/24/2023 2.7     A/G Ratio 10/24/2023 1.8     BUN/Creatinine Ratio 10/24/2023 11.8     Anion Gap 10/24/2023 12.0     eGFR 10/24/2023 43.7 (L)     Protime 10/24/2023 9.9     INR 10/24/2023 <0.93 (L)     PTT 10/24/2023 26.3 (L)     HS Troponin T 10/24/2023 15 (H)     proBNP 10/24/2023 149.7     WBC 10/24/2023 8.90     RBC 10/24/2023 4.70     Hemoglobin 10/24/2023 14.3     Hematocrit 10/24/2023 43.4     MCV 10/24/2023 92.4     MCH 10/24/2023 30.4     MCHC 10/24/2023 32.9     RDW 10/24/2023 13.5     RDW-SD 10/24/2023 43.3     MPV 10/24/2023 9.1     Platelets 10/24/2023 250     Neutrophil % 10/24/2023 70.9     Lymphocyte % 10/24/2023 18.1 (L)     Monocyte % 10/24/2023 6.9     Eosinophil % 10/24/2023 2.9     Basophil % 10/24/2023 1.2     Neutrophils, Absolute 10/24/2023 6.30     Lymphocytes, Absolute 10/24/2023 1.60     Monocytes, Absolute 10/24/2023 0.60     Eosinophils, Absolute 10/24/2023 0.30     Basophils, Absolute 10/24/2023 0.10     nRBC 10/24/2023 0.0     WBC 10/25/2023 8.90     RBC 10/25/2023 4.45     Hemoglobin 10/25/2023 13.8     Hematocrit 10/25/2023 41.1     MCV 10/25/2023 92.5     MCH 10/25/2023 31.1     MCHC 10/25/2023 33.7     RDW 10/25/2023 13.6     RDW-SD 10/25/2023 43.8     MPV 10/25/2023 9.3     Platelets 10/25/2023 239     QT Interval 10/25/2023 340     QTC Interval 10/25/2023 435     HS Troponin T 10/25/2023 98 (C)     HS Troponin T 10/25/2023 274 (C)     Troponin T Delta 10/25/2023 176 (C)     PTT 10/25/2023 22.2 (L)     Total Cholesterol 10/25/2023 102     Triglycerides 10/25/2023 102     HDL Cholesterol 10/25/2023 42     LDL Cholesterol  10/25/2023 41     VLDL Cholesterol 10/25/2023 19     LDL/HDL Ratio 10/25/2023 0.94     Hemoglobin A1C 10/25/2023 6.20 (H)     Glucose 10/25/2023 127 (H)      Protein/Creatinine Ratio* 10/25/2023 527.0 (H)     Creatinine, Urine 10/25/2023 59.2     Total Protein, Urine 10/25/2023 31.2     Sodium, Urine 10/25/2023 109     Color, UA 10/25/2023 Yellow     Appearance, UA 10/25/2023 Clear     pH, UA 10/25/2023 7.5     Specific [place], UA 10/25/2023 1.054 (H)     Glucose, UA 10/25/2023 Negative     Ketones, UA 10/25/2023 Negative     Bilirubin, UA 10/25/2023 Negative     Blood, UA 10/25/2023 Small (1+) (A)     Protein, UA 10/25/2023 30 mg/dL (1+) (A)     Leuk Esterase, UA 10/25/2023 Negative     Nitrite, UA 10/25/2023 Negative     Urobilinogen, UA 10/25/2023 1.0 E.U./dL     Activated Clotting Time  10/25/2023 179 (H)     Activated Clotting Time  10/25/2023 389 (H)     Activated Clotting Time  10/25/2023 347 (H)     Glucose 10/25/2023 159 (H)     Activated Clotting Time  10/25/2023 206 (H)     RBC, UA 10/25/2023 3-5 (A)     WBC, UA 10/25/2023 0-2     Bacteria, UA 10/25/2023 None Seen     Squamous Epithelial Cell* 10/25/2023 0-2     Hyaline Casts, UA 10/25/2023 None Seen     Methodology 10/25/2023 Automated Microscopy     Activated Clotting Time  10/25/2023 168 (H)     Activated Clotting Time  10/25/2023 179 (H)     Glucose 10/25/2023 118 (H)     Glucose 10/26/2023 120 (H)     BUN 10/26/2023 17     Creatinine 10/26/2023 1.31 (H)     Sodium 10/26/2023 138     Potassium 10/26/2023 4.3     Chloride 10/26/2023 103     CO2 10/26/2023 27.0     Calcium 10/26/2023 9.1     Albumin 10/26/2023 3.7     Phosphorus 10/26/2023 3.3     Anion Gap 10/26/2023 8.0     BUN/Creatinine Ratio 10/26/2023 13.0     eGFR 10/26/2023 58.9 (L)     Glucose 10/25/2023 115 (H)     WBC 10/26/2023 9.20     RBC 10/26/2023 4.04 (L)     Hemoglobin 10/26/2023 12.4 (L)     Hematocrit 10/26/2023 36.8 (L)     MCV 10/26/2023 91.1     MCH 10/26/2023 30.8     MCHC 10/26/2023 33.8     RDW 10/26/2023 13.5     RDW-SD 10/26/2023 45.1     MPV 10/26/2023 9.0     Platelets 10/26/2023 207     Total Cholesterol 10/26/2023 90      Triglycerides 10/26/2023 124     HDL Cholesterol 10/26/2023 36 (L)     LDL Cholesterol  10/26/2023 32     VLDL Cholesterol 10/26/2023 22     LDL/HDL Ratio 10/26/2023 0.81     QT Interval 10/26/2023 371     QTC Interval 10/26/2023 422     Glucose 10/26/2023 109 (H)     Glucose 10/26/2023 119 (H)    Lab on 10/24/2023   Component Date Value    WBC 10/24/2023 6.30     RBC 10/24/2023 4.46     Hemoglobin 10/24/2023 13.8     Hematocrit 10/24/2023 41.8     MCV 10/24/2023 93.7     MCH 10/24/2023 30.9     MCHC 10/24/2023 32.9     RDW 10/24/2023 13.3     RDW-SD 10/24/2023 43.3     MPV 10/24/2023 9.1     Platelets 10/24/2023 235     Glucose 10/24/2023 130 (H)     BUN 10/24/2023 21     Creatinine 10/24/2023 1.66 (H)     Sodium 10/24/2023 139     Potassium 10/24/2023 4.6     Chloride 10/24/2023 103     CO2 10/24/2023 26.7     Calcium 10/24/2023 9.8     BUN/Creatinine Ratio 10/24/2023 12.7     Anion Gap 10/24/2023 9.3     eGFR 10/24/2023 44.6 (L)     Protime 10/24/2023 9.8     INR 10/24/2023 <0.93 (L)    Admission on 10/11/2023, Discharged on 10/12/2023   Component Date Value    QT Interval 10/11/2023 364     QTC Interval 10/11/2023 424     Glucose 10/12/2023 105 (H)     BUN 10/12/2023 26 (H)     Creatinine 10/12/2023 1.55 (H)     Sodium 10/12/2023 140     Potassium 10/12/2023 4.6     Chloride 10/12/2023 103     CO2 10/12/2023 27.0     Calcium 10/12/2023 9.8     Total Protein 10/12/2023 7.9     Albumin 10/12/2023 4.7     ALT (SGPT) 10/12/2023 14     AST (SGOT) 10/12/2023 14     Alkaline Phosphatase 10/12/2023 95     Total Bilirubin 10/12/2023 0.3     Globulin 10/12/2023 3.2     A/G Ratio 10/12/2023 1.5     BUN/Creatinine Ratio 10/12/2023 16.8     Anion Gap 10/12/2023 10.0     eGFR 10/12/2023 48.5 (L)     Color, UA 10/12/2023 Yellow     Appearance, UA 10/12/2023 Clear     pH, UA 10/12/2023 6.0     Specific Gravity, UA 10/12/2023 1.017     Glucose, UA 10/12/2023 Negative     Ketones, UA 10/12/2023 Negative     Bilirubin, UA  10/12/2023 Negative     Blood, UA 10/12/2023 Negative     Protein, UA 10/12/2023 30 mg/dL (1+) (A)     Leuk Esterase, UA 10/12/2023 Negative     Nitrite, UA 10/12/2023 Negative     Urobilinogen, UA 10/12/2023 1.0 E.U./dL     Protime 10/12/2023 9.8     INR 10/12/2023 <0.93 (L)     PTT 10/12/2023 25.9     HS Troponin T 10/12/2023 13     proBNP 10/12/2023 107.2     Magnesium 10/12/2023 2.3     WBC 10/12/2023 8.70     RBC 10/12/2023 4.74     Hemoglobin 10/12/2023 14.7     Hematocrit 10/12/2023 43.4     MCV 10/12/2023 91.5     MCH 10/12/2023 31.0     MCHC 10/12/2023 33.8     RDW 10/12/2023 13.7     RDW-SD 10/12/2023 46.4     MPV 10/12/2023 8.7     Platelets 10/12/2023 228     Neutrophil % 10/12/2023 72.2     Lymphocyte % 10/12/2023 15.6 (L)     Monocyte % 10/12/2023 7.5     Eosinophil % 10/12/2023 3.3     Basophil % 10/12/2023 1.4     Neutrophils, Absolute 10/12/2023 6.30     Lymphocytes, Absolute 10/12/2023 1.40     Monocytes, Absolute 10/12/2023 0.70     Eosinophils, Absolute 10/12/2023 0.30     Basophils, Absolute 10/12/2023 0.10     nRBC 10/12/2023 0.1     HS Troponin T 10/12/2023 13     Troponin T Delta 10/12/2023 0     RBC, UA 10/12/2023 0-2 (A)     WBC, UA 10/12/2023 0-2 (A)     Bacteria, UA 10/12/2023 None Seen     Squamous Epithelial Cell* 10/12/2023 0-2     Hyaline Casts, UA 10/12/2023 None Seen     Methodology 10/12/2023 Automated Microscopy      Recent labs reviewed and interpreted for clinical significance and application            Level of Care:           Dev Lawton MD  12/04/23  .

## 2023-12-05 ENCOUNTER — TREATMENT (OUTPATIENT)
Dept: CARDIAC REHAB | Facility: HOSPITAL | Age: 68
End: 2023-12-05
Payer: COMMERCIAL

## 2023-12-05 DIAGNOSIS — I21.4 NSTEMI, INITIAL EPISODE OF CARE: Primary | ICD-10-CM

## 2023-12-05 PROCEDURE — 93798 PHYS/QHP OP CAR RHAB W/ECG: CPT

## 2023-12-06 ENCOUNTER — TREATMENT (OUTPATIENT)
Dept: CARDIAC REHAB | Facility: HOSPITAL | Age: 68
End: 2023-12-06
Payer: COMMERCIAL

## 2023-12-06 DIAGNOSIS — I21.4 NSTEMI, INITIAL EPISODE OF CARE: Primary | ICD-10-CM

## 2023-12-06 PROCEDURE — 93798 PHYS/QHP OP CAR RHAB W/ECG: CPT

## 2023-12-11 ENCOUNTER — APPOINTMENT (OUTPATIENT)
Dept: CARDIAC REHAB | Facility: HOSPITAL | Age: 68
End: 2023-12-11
Payer: COMMERCIAL

## 2023-12-12 ENCOUNTER — TREATMENT (OUTPATIENT)
Dept: CARDIAC REHAB | Facility: HOSPITAL | Age: 68
End: 2023-12-12
Payer: COMMERCIAL

## 2023-12-12 DIAGNOSIS — I21.4 NSTEMI, INITIAL EPISODE OF CARE: Primary | ICD-10-CM

## 2023-12-12 PROCEDURE — 93798 PHYS/QHP OP CAR RHAB W/ECG: CPT

## 2023-12-13 ENCOUNTER — APPOINTMENT (OUTPATIENT)
Dept: CARDIAC REHAB | Facility: HOSPITAL | Age: 68
End: 2023-12-13
Payer: COMMERCIAL

## 2023-12-14 RX ORDER — HYDRALAZINE HYDROCHLORIDE 50 MG/1
50 TABLET, FILM COATED ORAL 2 TIMES DAILY
Qty: 180 TABLET | Refills: 1 | Status: SHIPPED | OUTPATIENT
Start: 2023-12-14

## 2023-12-18 ENCOUNTER — TREATMENT (OUTPATIENT)
Dept: CARDIAC REHAB | Facility: HOSPITAL | Age: 68
End: 2023-12-18
Payer: COMMERCIAL

## 2023-12-18 DIAGNOSIS — I21.4 NSTEMI (NON-ST ELEVATION MYOCARDIAL INFARCTION): Primary | ICD-10-CM

## 2023-12-18 PROCEDURE — 93798 PHYS/QHP OP CAR RHAB W/ECG: CPT

## 2023-12-19 ENCOUNTER — APPOINTMENT (OUTPATIENT)
Dept: CARDIAC REHAB | Facility: HOSPITAL | Age: 68
End: 2023-12-19
Payer: COMMERCIAL

## 2023-12-20 ENCOUNTER — APPOINTMENT (OUTPATIENT)
Dept: CARDIAC REHAB | Facility: HOSPITAL | Age: 68
End: 2023-12-20
Payer: COMMERCIAL

## 2023-12-26 ENCOUNTER — APPOINTMENT (OUTPATIENT)
Dept: CARDIAC REHAB | Facility: HOSPITAL | Age: 68
End: 2023-12-26
Payer: COMMERCIAL

## 2023-12-27 ENCOUNTER — APPOINTMENT (OUTPATIENT)
Dept: CARDIAC REHAB | Facility: HOSPITAL | Age: 68
End: 2023-12-27
Payer: COMMERCIAL

## 2024-01-01 ENCOUNTER — APPOINTMENT (OUTPATIENT)
Dept: CARDIAC REHAB | Facility: HOSPITAL | Age: 69
End: 2024-01-01
Payer: COMMERCIAL

## 2024-01-02 ENCOUNTER — APPOINTMENT (OUTPATIENT)
Dept: CARDIAC REHAB | Facility: HOSPITAL | Age: 69
End: 2024-01-02
Payer: COMMERCIAL

## 2024-01-08 ENCOUNTER — TELEPHONE (OUTPATIENT)
Dept: CARDIOLOGY | Facility: CLINIC | Age: 69
End: 2024-01-08

## 2024-01-08 NOTE — TELEPHONE ENCOUNTER
Caller: RASHEEDA LOZANO    Relationship: Emergency Contact    Best call back number: [phone]     Requested Prescriptions:   Requested Prescriptions      No prescriptions requested or ordered in this encounter    PLAVIX 75MG     Pharmacy where request should be sent: [facility] [address] [place], [place] [id number] [place] [phone]      Last office visit with prescribing clinician: 11/15/2023   Last telemedicine visit with prescribing clinician: Visit date not found   Next office visit with prescribing clinician: 4/15/2024     Additional details provided by patient: PT WIFE REACHING OUT AS PT IS OUT OF HIS PLAVIX AND REQUESTING A NEW PRESCRIPTION TO BE SENT TO [facility] - MED NOT LISTED ON MED LIST AND WHEN TRYING TO MANUALLY THERE IS INTERACTION - PT WIFE STATES THEY ARE NEEDING AUTHORIZATION FROM OFFICE - PT ALSO RECEIVED LETTER STATING THAT 90 DAY REFILLS ARE NEEDED FOR REFILLS     Does the patient have less than a 3 day supply:  [x] Yes  [] No      Naga Contreras Rep   01/08/24 13:50 EST

## 2024-01-11 RX ORDER — CARVEDILOL 6.25 MG/1
6.25 TABLET ORAL 2 TIMES DAILY
Qty: 180 TABLET | Refills: 1 | Status: SHIPPED | OUTPATIENT
Start: 2024-01-11

## 2024-02-09 ENCOUNTER — TELEPHONE (OUTPATIENT)
Dept: CARDIOLOGY | Facility: CLINIC | Age: 69
End: 2024-02-09
Payer: COMMERCIAL

## 2024-02-09 NOTE — TELEPHONE ENCOUNTER
Caller: RASHEEDA    Relationship: Self    Best call back number: [phone]    What is the best time to reach you: ANYTIME    Who are you requesting to speak with (clinical staff, provider,  specific staff member): CLINICAL        What was the call regarding:   PT IS ON BRILINTA 90 MG HIS PRESCRIPTION COST $400 FOR 90 DAY SUPPLY.  CAN YOU PRESCRIBE ANOTHER MEDICATION? PLEASE CALL PT              Is it okay if the provider responds through MyChart: NO

## 2024-02-09 NOTE — TELEPHONE ENCOUNTER
Spoke with [facility]. They just picked up a 3 month supply of brilinta they will call us back when he is getting down to the last few days of it and we will switch him to plavix then.

## 2024-03-29 ENCOUNTER — TRANSCRIBE ORDERS (OUTPATIENT)
Dept: ADMINISTRATIVE | Facility: HOSPITAL | Age: 69
End: 2024-03-29
Payer: COMMERCIAL

## 2024-03-29 DIAGNOSIS — N18.31 CHRONIC KIDNEY DISEASE (CKD) STAGE G3A/A1, MODERATELY DECREASED GLOMERULAR FILTRATION RATE (GFR) BETWEEN 45-59 ML/MIN/1.73 SQUARE METER AND ALBUMINURIA CREATININE RATIO LESS THAN 30 MG/G (CMS/H*: Primary | ICD-10-CM

## 2024-04-02 ENCOUNTER — TELEPHONE (OUTPATIENT)
Dept: CARDIOLOGY | Facility: CLINIC | Age: 69
End: 2024-04-02
Payer: COMMERCIAL

## 2024-04-02 RX ORDER — ISOSORBIDE MONONITRATE 60 MG/1
60 TABLET, EXTENDED RELEASE ORAL DAILY
Qty: 90 TABLET | Refills: 1 | Status: SHIPPED | OUTPATIENT
Start: 2024-04-02

## 2024-04-02 NOTE — TELEPHONE ENCOUNTER
Caller: RASHEEDA OLZANO    Relationship: Emergency Contact    Best call back number: [phone]    Requested Prescriptions:   Requested Prescriptions     Pending Prescriptions Disp Refills    isosorbide mononitrate (IMDUR) 30 MG 24 hr tablet 90 tablet 3     Sig: Take 2 tablets by mouth Daily.        Pharmacy where request should be sent: [facility] [address] [place], [place] [id number] [place] [phone]      Last office visit with prescribing clinician: 11/15/2023   Last telemedicine visit with prescribing clinician: Visit date not found   Next office visit with prescribing clinician: 4/26/2024       Does the patient have less than a 3 day supply:  [x] Yes  [] No    Would you like a call back once the refill request has been completed: [] Yes [x] No    If the office needs to give you a call back, can they leave a voicemail: [] Yes [x] No    Naga Sorto Rep   04/02/24 10:52 EDT

## 2024-04-12 ENCOUNTER — HOSPITAL ENCOUNTER (OUTPATIENT)
Dept: ULTRASOUND IMAGING | Facility: HOSPITAL | Age: 69
Discharge: HOME | End: 2024-04-12
Admitting: INTERNAL MEDICINE
Payer: COMMERCIAL

## 2024-04-12 DIAGNOSIS — N18.31 CHRONIC KIDNEY DISEASE (CKD) STAGE G3A/A1, MODERATELY DECREASED GLOMERULAR FILTRATION RATE (GFR) BETWEEN 45-59 ML/MIN/1.73 SQUARE METER AND ALBUMINURIA CREATININE RATIO LESS THAN 30 MG/G (CMS/H*: ICD-10-CM

## 2024-04-12 PROCEDURE — 76775 US EXAM ABDO BACK WALL LIM: CPT

## 2024-04-26 ENCOUNTER — TRANSCRIBE ORDERS (OUTPATIENT)
Dept: ADMINISTRATIVE | Facility: HOSPITAL | Age: 69
End: 2024-04-26
Payer: COMMERCIAL

## 2024-04-26 ENCOUNTER — OFFICE VISIT (OUTPATIENT)
Dept: CARDIOLOGY | Facility: CLINIC | Age: 69
End: 2024-04-26
Payer: COMMERCIAL

## 2024-04-26 ENCOUNTER — LAB (OUTPATIENT)
Dept: LAB | Facility: HOSPITAL | Age: 69
End: 2024-04-26
Payer: COMMERCIAL

## 2024-04-26 VITALS
HEIGHT: 72 IN | DIASTOLIC BLOOD PRESSURE: 62 MMHG | BODY MASS INDEX: 39.01 KG/M2 | WEIGHT: 288 LBS | HEART RATE: 66 BPM | SYSTOLIC BLOOD PRESSURE: 116 MMHG | RESPIRATION RATE: 18 BRPM

## 2024-04-26 DIAGNOSIS — I25.10 CORONARY ARTERY DISEASE INVOLVING NATIVE CORONARY ARTERY OF NATIVE HEART WITHOUT ANGINA PECTORIS: Primary | ICD-10-CM

## 2024-04-26 DIAGNOSIS — N18.31 CHRONIC KIDNEY DISEASE (CKD) STAGE G3A/A1, MODERATELY DECREASED GLOMERULAR FILTRATION RATE (GFR) BETWEEN 45-59 ML/MIN/1.73 SQUARE METER AND ALBUMINURIA CREATININE RATIO LESS THAN 30 MG/G (CMS/H*: ICD-10-CM

## 2024-04-26 DIAGNOSIS — N18.31 CHRONIC KIDNEY DISEASE (CKD) STAGE G3A/A1, MODERATELY DECREASED GLOMERULAR FILTRATION RATE (GFR) BETWEEN 45-59 ML/MIN/1.73 SQUARE METER AND ALBUMINURIA CREATININE RATIO LESS THAN 30 MG/G (CMS/H*: Primary | ICD-10-CM

## 2024-04-26 DIAGNOSIS — I10 ESSENTIAL HYPERTENSION: Chronic | ICD-10-CM

## 2024-04-26 DIAGNOSIS — Z95.1 HX OF CABG: ICD-10-CM

## 2024-04-26 DIAGNOSIS — E78.2 MIXED HYPERLIPIDEMIA: ICD-10-CM

## 2024-04-26 LAB
ANION GAP SERPL CALCULATED.3IONS-SCNC: 12 MMOL/L (ref 5–15)
BASOPHILS # BLD AUTO: 0.05 10*3/MM3 (ref 0–0.2)
BASOPHILS NFR BLD AUTO: 0.7 % (ref 0–1.5)
BILIRUB UR QL STRIP: NEGATIVE
BUN SERPL-MCNC: 20 MG/DL (ref 8–23)
BUN/CREAT SERPL: 11 (ref 7–25)
CALCIUM SPEC-SCNC: 10.4 MG/DL (ref 8.6–10.5)
CHLORIDE SERPL-SCNC: 101 MMOL/L (ref 98–107)
CLARITY UR: CLEAR
CO2 SERPL-SCNC: 30 MMOL/L (ref 22–29)
COLOR UR: YELLOW
CREAT SERPL-MCNC: 1.82 MG/DL (ref 0.76–1.27)
CREAT UR-MCNC: 65.4 MG/DL
DEPRECATED RDW RBC AUTO: 44.1 FL (ref 37–54)
EGFRCR SERPLBLD CKD-EPI 2021: 39.7 ML/MIN/1.73
EOSINOPHIL # BLD AUTO: 0.19 10*3/MM3 (ref 0–0.4)
EOSINOPHIL NFR BLD AUTO: 2.5 % (ref 0.3–6.2)
ERYTHROCYTE [DISTWIDTH] IN BLOOD BY AUTOMATED COUNT: 12.7 % (ref 12.3–15.4)
GLUCOSE SERPL-MCNC: 74 MG/DL (ref 65–99)
GLUCOSE UR STRIP-MCNC: NEGATIVE MG/DL
HCT VFR BLD AUTO: 42.7 % (ref 37.5–51)
HGB BLD-MCNC: 13.7 G/DL (ref 13–17.7)
HGB UR QL STRIP.AUTO: NEGATIVE
IMM GRANULOCYTES # BLD AUTO: 0.02 10*3/MM3 (ref 0–0.05)
IMM GRANULOCYTES NFR BLD AUTO: 0.3 % (ref 0–0.5)
KETONES UR QL STRIP: NEGATIVE
LEUKOCYTE ESTERASE UR QL STRIP.AUTO: NEGATIVE
LYMPHOCYTES # BLD AUTO: 1.48 10*3/MM3 (ref 0.7–3.1)
LYMPHOCYTES NFR BLD AUTO: 19.8 % (ref 19.6–45.3)
MCH RBC QN AUTO: 30.1 PG (ref 26.6–33)
MCHC RBC AUTO-ENTMCNC: 32.1 G/DL (ref 31.5–35.7)
MCV RBC AUTO: 93.8 FL (ref 79–97)
MONOCYTES # BLD AUTO: 0.57 10*3/MM3 (ref 0.1–0.9)
MONOCYTES NFR BLD AUTO: 7.6 % (ref 5–12)
NEUTROPHILS NFR BLD AUTO: 5.16 10*3/MM3 (ref 1.7–7)
NEUTROPHILS NFR BLD AUTO: 69.1 % (ref 42.7–76)
NITRITE UR QL STRIP: NEGATIVE
NRBC BLD AUTO-RTO: 0 /100 WBC (ref 0–0.2)
PH UR STRIP.AUTO: <=5 [PH] (ref 5–8)
PHOSPHATE SERPL-MCNC: 2.9 MG/DL (ref 2.5–4.5)
PLATELET # BLD AUTO: 243 10*3/MM3 (ref 140–450)
PMV BLD AUTO: 11.4 FL (ref 6–12)
POTASSIUM SERPL-SCNC: 4.4 MMOL/L (ref 3.5–5.2)
PROT ?TM UR-MCNC: 6.2 MG/DL
PROT UR QL STRIP: NEGATIVE
PROT/CREAT UR: 94.8 MG/G CREA (ref 0–200)
PTH-INTACT SERPL-MCNC: 63.4 PG/ML (ref 15–65)
RBC # BLD AUTO: 4.55 10*6/MM3 (ref 4.14–5.8)
SODIUM SERPL-SCNC: 143 MMOL/L (ref 136–145)
SP GR UR STRIP: 1.01 (ref 1–1.03)
UROBILINOGEN UR QL STRIP: NORMAL
WBC NRBC COR # BLD AUTO: 7.47 10*3/MM3 (ref 3.4–10.8)

## 2024-04-26 PROCEDURE — 82570 ASSAY OF URINE CREATININE: CPT

## 2024-04-26 PROCEDURE — 84100 ASSAY OF PHOSPHORUS: CPT

## 2024-04-26 PROCEDURE — 84156 ASSAY OF PROTEIN URINE: CPT

## 2024-04-26 PROCEDURE — 81003 URINALYSIS AUTO W/O SCOPE: CPT

## 2024-04-26 PROCEDURE — 80048 BASIC METABOLIC PNL TOTAL CA: CPT

## 2024-04-26 PROCEDURE — 83970 ASSAY OF PARATHORMONE: CPT

## 2024-04-26 PROCEDURE — 85025 COMPLETE CBC W/AUTO DIFF WBC: CPT

## 2024-04-26 PROCEDURE — 36415 COLL VENOUS BLD VENIPUNCTURE: CPT

## 2024-04-26 NOTE — PROGRESS NOTES
Cardiology Clinic Note  Dev Lawton MD, PhD    Subjective:     Encounter Date:04/26/2024      Patient ID: Jamil Self is a 68 y.o. male.    Chief Complaint:  Chief Complaint   Patient presents with    Follow-up       HPI:    I the pleasure to see this 68-year-old gentleman back in clinic today with cardiac history of coronary artery disease status post bypass with LIMA to LAD, SVG to RCA and marginal branch historically, drug-eluting stent SVG to RCA previously, history of preserved EF 57% by last 2D echo, essential hypertension hyperlipidemia diabetes obesity obstructive sleep apnea and tobacco abuse.  Even more recently he underwent cardiac catheterization January 2023 with PCI distal LAD tandem lesions distal to the anastomosis status post overlapping 2.25 x 12 and 2.0 x 26 drug-eluting stents postdilated aggressively, heavy circumflex calcific disease with closed SVG to circumflex but with RYLIE-3 flow to the obtuse marginal branches that were managed medically.  SVG to RCA was widely patent, EF 50 to 55% with chronic inferior wall motion abnormality.  He has been hospitalized multiple times over the last 6 months including recently in October 2023 for unstable angina.  Repeat left heart catheterization yet again revealed progression of disease in the left main and proximal LAD compromising flow to the diagonals with unstable hazy lesion in distal left main, LAD at that time.  He underwent PCI with Xience 2.5 x 15 drug-eluting stent ostial proximal LAD postdilated 3.0 at high pressure, PCI Xience 2.5 x 23 ostial proximal circumflex to the ostial left main postdilated 3.25 at high pressure in the circumflex and 3.5 at 20 justin in the left main, complete treatment of left main disease for reestablishment of flow to the circumflex with close bypass to that distribution.  He is doing well on repeat follow-up today essentially angina free on current medications.  Blood pressure stable, lipids are stable and he  is lost 15 pounds with intentional weight loss diet exercise and heart healthy changes., no heart failure signs or symptoms and remains with chronic stable dyspnea on exertion.     Review of systems otherwise negative x14 point review of systems except as mentioned above  Historical data and HPI copied forward from previous encounters in EMR including the history, exam, and assessment/plan has been reviewed and is unchanged unless noted otherwise.     Cardiac medicines reviewed with risk, benefits, and necessity of each discussed.     Risk and benefit of cardiac testing reviewed including death heart attack stroke pain bleeding infection need for vascular /cardiovascular surgery were discussed and the patient      Objective:         Vitals reviewed below  Physical Exam  Regular rate and rhythm no rubs gallops heave or lift, 1 out of 6 systolic ejection murmur left renal border  Obese soft nontender nondistended  Clear to auscultation with delayed expiratory phase  Skin is warm and dry  Normal radial pulses normal cap refill  Intact grossly  Assessment:         Independently assessed and manage medical conditions today  Coronary artery disease of native and bypass grafts  Recent PCI in the last 1 year, negative distal LAD overlapping stents, proximal ostial LAD as well as proximal ostial circumflex back to the ostium of the left main October 2023  Chronic stable angina  Tobacco abuse  Essential hypertension  Hyperlipidemia  Morbid obesity admit  Obstructive sleep apnea  Comorbidity of diabetes     Diet and exercise per AHA guidelines  Secondary prevention goals  Continue dual antiplatelet therapy with aspirin and ticagrelor  Is without angina since revascularization described October 2023 otherwise doing as well as he has in the last year or so he says  Beta-blocker with Coreg  Antianginals with amlodipine isosorbide mononitrate status post revascularization  Antihypertensives with losartan as well as after mentioned  "medicines  Goal blood pressure less than 130 systolic optimally  Successfully lost 12 to 15 pounds, continue efforts, continue diet and exercise as allowed by functional status  Increase atorvastatin to 40 daily, follow-up fasting lipid panel if not less than 55 with respect to LDL would increase to 80 daily or add Zetia at this time  Continue smoking abstinence     Back to clinic 9 months       There are no diagnoses linked to this encounter.    Objective:         /62 (BP Location: Left arm, Patient Position: Sitting)   Pulse 66   Resp 18   Ht 182.9 cm (72\")   Wt 131 kg (288 lb)   BMI 39.06 kg/m²           The pleasure to be involved in this patient's cardiovascular care.  Please call with any questions or concerns  Dev Lawton MD, PhD    Most recent EKG as reviewed and interpreted by me:  Procedures     Most recent echo as reviewed and interpreted by me:      Most recent stress test as reviewed and interpreted by me:  Results for orders placed during the hospital encounter of 01/20/23    Stress Test With Myocardial Perfusion (1 Day)    Interpretation Summary    Left ventricular ejection fraction is normal (Calculated EF = 66%).    Myocardial perfusion imaging indicates a small-sized, mildly severe area of ischemia located in the anterior wall and septal wall.    Impressions are consistent with a high risk study.    This is Cardiolite imaging stress test with small amount of distal anterior wall and apical ischemia.  No myocardial infarction noted.  Left ventricular size and function was normal.    Findings consistent with an indeterminate ECG stress test.      Most recent cardiac catheterization as reviewed interpreted by me:  Results for orders placed during the hospital encounter of 10/24/23    Cardiac Catheterization/Vascular Study    Conclusion  Primary operative Dev Lawton MD, PhD  [facility] cardiology  Date of service 10-    Procedure  1.  Left heart catheterization coronary " angiography left ventriculography in AMARAL position with imaging of native vessels and bypass grafts  2.,  Percutaneous coronary invention with balloon angioplasty of the distal left main, ostial proximal LAD and ostial proximal circumflex  3.  PCI with Xience 2.5 x 15 drug-eluting stent to the ostial proximal LAD postdilated 3.0 at 18 justin reducing stenosis from greater than 95% to 0% residual  4.  Percutaneous coronary invention PCI with Xience 2.5 x 23 Xience drug-eluting stent ostial proximal circumflex back to the ostial left main postdilated 3.0-3.2 distally avoiding the distal edge and the circumflex ostium and 3.5 x 8 NC balloon at 18 justin in the left main back to the ostium, reduction of stenosis less than 2% residual at the ostium of the circumflex, reduction of 80% stenosis to 90% stenosis in the distal left main to 0% residual  RYLIE II flow preprocedurally, RYLIE-3 flow post procedurally    Indication  Non-STEMI, chest pain    After informed consent patient brought to the Cath Lab sterilely prepped and draped in usual fashion expose the right groin for right common femoral arterial access via micropuncture and modified Seldinger technique placement of a 6 Israeli sheath initially.  Only 5 guidewire was advanced aortic valve followed by diagnostic JL 4 and JR4 catheters for selective left and right coronary angiography respectively.  JR4 was used to cross aortic valve followed by hand-injection for LV gram EDP assessment and pullback assessment of the transaortic gradient.  JR4 was used to used to image the LIMA to LAD, multipurpose catheter was used to and the SVG to RCA, SVG to obtuse marginal known to be  previously.  SVG to RCA widely patent unchanged from January angiography, LIMA to LAD widely patent with prior mid LAD to distal LAD stents previously placed also widely patent with no in-stent restenosis and RYLIE-3 flow.  Distal left main greater than 95% with compromise of the ostial proximal LAD and  ostial proximal circumflex which was unbypassed with  of the SVG to obtuse marginal branch which look to have progression since prior cath in January and intervention to the LAD.  Patient heparinized with 100 units/kg of heparin for ACT greater than 250.  Patient was on background therapy of aspirin and Plavix, he was given Integrilin in the ER.  He was on a nitroglycerin drip as well for uncontrolled hypertension.  A 7 Micronesian sheath was upsized, 7 Micronesian EBU 4.0 guide to engage the left main followed by run-through wire to the circumflex and obtuse marginal branch, BMW wire to the LAD into a diagonal branch.  2.5 x 15 balloon to the LAD and a 2.25 x 15 balloon to the ostial proximal circumflex with balloon angioplasty with both independently at 16 to 18 justin followed by kissing balloon angioplasty, a 2.5 x 23 Xience drug-eluting stent was positioned in the ostial proximal circumflex back to the left main and a 2.5 x 15 Xience drug-eluting stent in the ostial proximal LAD given  just after the first septal  with widely patent LIMA to LAD.  We chose the circumflex is a dominant branch in this clinical situation.  The LAD stent was deployed at 10 to 12 justin followed by pullback of the stent balloon 2-3 struts with further postdilatation at 20 to 22 justin with the stent balloon, reduction of stenosis to 0% residual also further balloon angioplasty of the distal left main.  Next the circumflex stent was deployed back to the ostium of the left main at 12 justin on 2 inflations followed by pullback of the stent balloon 2-3 struts and further postdilatation at 22 justin with reduction of stenosis to less than 20% residual.  There was some mild recoil at the ostium of the circumflex.  A 3.5 x 8 NC balloon was used to post dilate the left main at 20 justin and distal to proximal fashion, there was no residual ostial LAD disease with RYLIE-3 flow distally, singh wire was placed to the circumflex followed by further  postdilatation of the ostial proximal segment with 2.75 x 12 at 22 justin for 1 minute with great angiographic improvement of the recoil and reduction of stenosis less than 10% residual.  There was RYLIE-3 flow with great angiographic appearance of the obtuse marginal branch 1, OM 2 had significant proximal disease but very small caliber with slightly sub-RYLIE-3 flow but given small vessel disease with left be medically managed in this clinical scenario with staged intervention if he continued to have chest pain or shortness of breath.  After final angiography with no distal wire trauma edge dissection, widely patent distal left main into LAD and circumflex proximally as well as the proximal obtuse marginal branch and recurrent left atrial branch the procedure was concluded.  ACT on finishing case was greater than 250, patient was loaded with Brilinta on the table Integrilin was discontinued, nitro drip and Versed drip was continued.  Patient with GERD chest pain-free hemodynamically electrically stable alert talking to staff neurologically gross intact bilaterally    Complications none  Blood loss less than 20 cc  Sedation time of 1 hour 15 minutes  Contrast 220 cc total    Findings  1.  Opening aortic pressure of 154/73 with a mean of 120, closing pressure 148/83 with a mean of 112  2.  LV pressure 143/5 with an EDP of 15-17  3.  Normal transaortic valve gradient on pullback      Angiography  1.  Left main 4 normal LV systolic function 55 to 60% normal takeoff, diffusely diseased, 90% distally with haziness  2.  The ostial LAD 90% to 95% with haziness, for septal  takes off and also a small caliber diagonal branch, the LAD is  thereafter, widely patent LIMA to LAD with retrograde perfusion to more proximal septals although does not perfuse any further diagonal branches, distally there is overlapping widely patent stents with no in-stent restenosis as the LAD wraps to around the apex  3.  Ostial circumflex  99% with RYLIE II flow, there is diffuse small vessel disease distally in the first obtuse marginal branch 60 to 70% less than 2 mm in diameter throughout its course, second obtuse marginal branch has proximal greater than 90% stenosis but has RYLIE II flow distally as it wraps around the inferolateral wall and has diffuse small vessel disease 60 to 70% as well, this can be staged in the future if patient continues have chest pain.  It appears to be angiographically unchanged from prior January angiographic appearance  4.  RCA , widely patent SVG to distal RCA with distal diffuse disease which is angiographically unchanged in the PLV and PDA with no anastomotic disease  5.  SVG to circumflex/obtuse marginal   6.  SVG to RCA widely patent  7.  LIMA to LAD normal takeoff widely patent throughout course with no disease    Conclusions recommendations  1.  NSTEMI, chest pain unstable angina culprit distal left main compromising flow to a unbypassed circumflex distribution, also ostial LAD compromising septal and proximal diagonal branch status post intervention with 2.5 x 15 to the LAD postdilated 3.0 high-pressure with 2.5 x 23 2 the circumflex back to ostial left main given  of the LAD distally postdilated 3.0 distally at high pressure and 3.5 in the left main back to the ostium with flaring of the ostial portion of the stent at 22 justin with great angiographic results of the intervened upon segment.  2.  Residual small vessel disease in the second obtuse marginal branch proximally which can be staged at a later date if he continues to have chest pain or unstable angina  3.  Aspirin Brilinta, high intensity statin, afterload reduction for goal blood pressure less than 135 systolic, will admit to the hospital further optimization    Further recommendation follow findings and clinical course    Dev Lawton MD, PhD    The following portions of the patient's history were reviewed and updated as appropriate:  allergies, current medications, past family history, past medical history, past social history, past surgical history, and problem list.      ROS:  14 point review of systems negative except as mentioned above    Current Outpatient Medications:     amLODIPine (NORVASC) 10 MG tablet, Take 1 tablet by mouth Daily., Disp: , Rfl:     aspirin 81 MG EC tablet, Take 1 tablet by mouth Daily., Disp: 30 tablet, Rfl: 0    atorvastatin (LIPITOR) 40 MG tablet, Take 1 tablet by mouth Daily., Disp: 30 tablet, Rfl: 11    carvedilol (COREG) 6.25 MG tablet, TAKE 1 TABLET BY MOUTH TWICE A DAY, Disp: 180 tablet, Rfl: 1    furosemide (LASIX) 20 MG tablet, Take 1 tablet by mouth Daily., Disp: , Rfl:     hydrALAZINE (APRESOLINE) 50 MG tablet, TAKE 1 TABLET BY MOUTH TWICE A DAY, Disp: 180 tablet, Rfl: 1    isosorbide mononitrate (IMDUR) 60 MG 24 hr tablet, Take 1 tablet by mouth Daily., Disp: 90 tablet, Rfl: 1    losartan (COZAAR) 100 MG tablet, Take 1 tablet by mouth Daily., Disp: , Rfl:     metFORMIN (GLUCOPHAGE) 1000 MG tablet, Take 0.5 tablets by mouth 2 (Two) Times a Day With Meals., Disp: , Rfl:     ticagrelor (BRILINTA) 90 MG tablet tablet, Take 1 tablet by mouth 2 (Two) Times a Day., Disp: 60 tablet, Rfl: 6    Problem List:  Patient Active Problem List   Diagnosis    Essential hypertension    Diabetes mellitus    Mixed hyperlipidemia    Tobacco abuse    Coronary artery disease involving native coronary artery of native heart without angina pectoris    LIZETTE (obstructive sleep apnea)    Obesity (BMI 30-39.9)    CKD (chronic kidney disease) stage 3, GFR 30-59 ml/min    Acute bronchitis due to Rhinovirus    Controlled type 2 diabetes mellitus without complication, without long-term current use of insulin    Dyslipidemia    Hx of CABG    Tobacco dependence    Hyponatremia    Abnormal stress test    Unstable angina    NSTEMI (non-ST elevated myocardial infarction)     Past Medical History:  Past Medical History:   Diagnosis Date     Coronary artery disease     Diabetes mellitus     Hyperlipidemia     Hypertension     LIZETTE (obstructive sleep apnea)      Past Surgical History:  Past Surgical History:   Procedure Laterality Date    CARDIAC CATHETERIZATION N/A 2021    Procedure: Left Heart Cath;  Surgeon: Jourdan Pillai MD;  Location: [facility] CATH INVASIVE LOCATION;  Service: Cardiology;  Laterality: N/A;    CARDIAC CATHETERIZATION N/A 2023    Procedure: Left Heart Cath;  Surgeon: Dev Lawton MD;  Location: [facility] CATH INVASIVE LOCATION;  Service: Cardiology;  Laterality: N/A;    CARDIAC CATHETERIZATION N/A 10/25/2023    Procedure: Left Heart Cath;  Surgeon: Dev Lawton MD;  Location: [facility] CATH INVASIVE LOCATION;  Service: Cardiology;  Laterality: N/A;    CAROTID STENT      CORONARY ARTERY BYPASS GRAFT      , by patient's guess    CORONARY STENT PLACEMENT       Social History:  Social History     Socioeconomic History    Marital status:    Tobacco Use    Smoking status: Former     Current packs/day: 0.00     Average packs/day: 1 pack/day for 40.0 years (40.0 ttl pk-yrs)     Types: Cigarettes     Start date: 10/20/1983     Quit date: 10/20/2023     Years since quittin.5    Smokeless tobacco: Never   Vaping Use    Vaping status: Never Used   Substance and Sexual Activity    Alcohol use: Never    Drug use: Never    Sexual activity: Defer     Allergies:  No Known Allergies  Immunizations:  There is no immunization history for the selected administration types on file for this patient.         In-Office Procedure(s):  No orders to display        ASCVD RIsk Score::  The ASCVD Risk score (Clay DK, et al., 2019) failed to calculate for the following reasons:    The patient has a prior MI or stroke diagnosis    Imaging:    Results for orders placed during the hospital encounter of 10/24/23    XR Chest 1 View    Narrative  XR CHEST 1 VW    Date of Exam: 10/24/2023 11:15 PM EDT    Indication:  chest pain    Comparison: Chest radiograph dated October 12, 2023    Findings:  The heart is enlarged with changes of midline sternotomy and coronary artery bypass grafting. The pulmonary vascular markings are normal. The lungs are clear.    Impression  Impression:  No active disease      Electronically Signed: Ezequiel Hedrick MD  10/24/2023 11:30 PM EDT  Workstation ID: [id number]       Results for orders placed during the hospital encounter of 04/12/24    US Renal Bilateral    Narrative  US RENAL BILATERAL    Date of Exam: 4/12/2024 12:54 PM EDT    Indication: N18.31.    Comparison: No comparisons available.    Technique: Grayscale and color Doppler ultrasound evaluation of the kidneys and urinary bladder was performed.      Findings:  The right kidney measures 10.3 cm and the left kidney measures 12.1 cm. Kidney echogenicity and vascularity appear within normal limits. There is no solid kidney mass.  No echogenic shadowing stone.  No hydronephrosis.      The bladder is decompressed limiting the evaluation    Impression  Impression:    Unremarkable renal ultrasound        Electronically Signed: Tomas Pedraza MD  4/12/2024 2:28 PM EDT  Workstation ID: [id number]          Lab Review:   No visits with results within 6 Month(s) from this visit.   Latest known visit with results is:   Admission on 10/24/2023, Discharged on 10/26/2023   Component Date Value    QT Interval 10/24/2023 348     QTC Interval 10/24/2023 414     Extra Tube 10/24/2023 hold for add-on     Extra Tube 10/24/2023 Hold for add-ons.     Extra Tube 10/24/2023 Hold for add-ons.     Glucose 10/24/2023 205 (H)     BUN 10/24/2023 20     Creatinine 10/24/2023 1.69 (H)     Sodium 10/24/2023 140     Potassium 10/24/2023 4.1     Chloride 10/24/2023 100     CO2 10/24/2023 28.0     Calcium 10/24/2023 10.0     Total Protein 10/24/2023 7.6     Albumin 10/24/2023 4.9     ALT (SGPT) 10/24/2023 9     AST (SGOT) 10/24/2023 7     Alkaline Phosphatase 10/24/2023 93      Total Bilirubin 10/24/2023 0.3     Globulin 10/24/2023 2.7     A/G Ratio 10/24/2023 1.8     BUN/Creatinine Ratio 10/24/2023 11.8     Anion Gap 10/24/2023 12.0     eGFR 10/24/2023 43.7 (L)     Protime 10/24/2023 9.9     INR 10/24/2023 <0.93 (L)     PTT 10/24/2023 26.3 (L)     HS Troponin T 10/24/2023 15 (H)     proBNP 10/24/2023 149.7     WBC 10/24/2023 8.90     RBC 10/24/2023 4.70     Hemoglobin 10/24/2023 14.3     Hematocrit 10/24/2023 43.4     MCV 10/24/2023 92.4     MCH 10/24/2023 30.4     MCHC 10/24/2023 32.9     RDW 10/24/2023 13.5     RDW-SD 10/24/2023 43.3     MPV 10/24/2023 9.1     Platelets 10/24/2023 250     Neutrophil % 10/24/2023 70.9     Lymphocyte % 10/24/2023 18.1 (L)     Monocyte % 10/24/2023 6.9     Eosinophil % 10/24/2023 2.9     Basophil % 10/24/2023 1.2     Neutrophils, Absolute 10/24/2023 6.30     Lymphocytes, Absolute 10/24/2023 1.60     Monocytes, Absolute 10/24/2023 0.60     Eosinophils, Absolute 10/24/2023 0.30     Basophils, Absolute 10/24/2023 0.10     nRBC 10/24/2023 0.0     WBC 10/25/2023 8.90     RBC 10/25/2023 4.45     Hemoglobin 10/25/2023 13.8     Hematocrit 10/25/2023 41.1     MCV 10/25/2023 92.5     MCH 10/25/2023 31.1     MCHC 10/25/2023 33.7     RDW 10/25/2023 13.6     RDW-SD 10/25/2023 43.8     MPV 10/25/2023 9.3     Platelets 10/25/2023 239     QT Interval 10/25/2023 340     QTC Interval 10/25/2023 435     HS Troponin T 10/25/2023 98 (C)     HS Troponin T 10/25/2023 274 (C)     Troponin T Delta 10/25/2023 176 (C)     PTT 10/25/2023 22.2 (L)     Total Cholesterol 10/25/2023 102     Triglycerides 10/25/2023 102     HDL Cholesterol 10/25/2023 42     LDL Cholesterol  10/25/2023 41     VLDL Cholesterol 10/25/2023 19     LDL/HDL Ratio 10/25/2023 0.94     Hemoglobin A1C 10/25/2023 6.20 (H)     Glucose 10/25/2023 127 (H)     Protein/Creatinine Ratio* 10/25/2023 527.0 (H)     Creatinine, Urine 10/25/2023 59.2     Total Protein, Urine 10/25/2023 31.2     Sodium, Urine 10/25/2023 109      Color, UA 10/25/2023 Yellow     Appearance, UA 10/25/2023 Clear     pH, UA 10/25/2023 7.5     Specific [place], UA 10/25/2023 1.054 (H)     Glucose, UA 10/25/2023 Negative     Ketones, UA 10/25/2023 Negative     Bilirubin, UA 10/25/2023 Negative     Blood, UA 10/25/2023 Small (1+) (A)     Protein, UA 10/25/2023 30 mg/dL (1+) (A)     Leuk Esterase, UA 10/25/2023 Negative     Nitrite, UA 10/25/2023 Negative     Urobilinogen, UA 10/25/2023 1.0 E.U./dL     Activated Clotting Time  10/25/2023 179 (H)     Activated Clotting Time  10/25/2023 389 (H)     Activated Clotting Time  10/25/2023 347 (H)     Glucose 10/25/2023 159 (H)     Activated Clotting Time  10/25/2023 206 (H)     RBC, UA 10/25/2023 3-5 (A)     WBC, UA 10/25/2023 0-2     Bacteria, UA 10/25/2023 None Seen     Squamous Epithelial Cell* 10/25/2023 0-2     Hyaline Casts, UA 10/25/2023 None Seen     Methodology 10/25/2023 Automated Microscopy     Activated Clotting Time  10/25/2023 168 (H)     Activated Clotting Time  10/25/2023 179 (H)     Glucose 10/25/2023 118 (H)     Glucose 10/26/2023 120 (H)     BUN 10/26/2023 17     Creatinine 10/26/2023 1.31 (H)     Sodium 10/26/2023 138     Potassium 10/26/2023 4.3     Chloride 10/26/2023 103     CO2 10/26/2023 27.0     Calcium 10/26/2023 9.1     Albumin 10/26/2023 3.7     Phosphorus 10/26/2023 3.3     Anion Gap 10/26/2023 8.0     BUN/Creatinine Ratio 10/26/2023 13.0     eGFR 10/26/2023 58.9 (L)     Glucose 10/25/2023 115 (H)     WBC 10/26/2023 9.20     RBC 10/26/2023 4.04 (L)     Hemoglobin 10/26/2023 12.4 (L)     Hematocrit 10/26/2023 36.8 (L)     MCV 10/26/2023 91.1     MCH 10/26/2023 30.8     MCHC 10/26/2023 33.8     RDW 10/26/2023 13.5     RDW-SD 10/26/2023 45.1     MPV 10/26/2023 9.0     Platelets 10/26/2023 207     Total Cholesterol 10/26/2023 90     Triglycerides 10/26/2023 124     HDL Cholesterol 10/26/2023 36 (L)     LDL Cholesterol  10/26/2023 32     VLDL Cholesterol 10/26/2023 22     LDL/HDL Ratio 10/26/2023  0.81     QT Interval 10/26/2023 371     QTC Interval 10/26/2023 422     Glucose 10/26/2023 109 (H)     Glucose 10/26/2023 119 (H)      Recent labs reviewed and interpreted for clinical significance and application            Level of Care:           Dev Lawton MD  04/26/24  .

## 2024-05-29 RX ORDER — TICAGRELOR 90 MG/1
90 TABLET ORAL 2 TIMES DAILY
Qty: 180 TABLET | Refills: 0 | Status: SHIPPED | OUTPATIENT
Start: 2024-05-29

## 2024-07-30 RX ORDER — CARVEDILOL 6.25 MG/1
6.25 TABLET ORAL 2 TIMES DAILY
Qty: 180 TABLET | Refills: 0 | Status: SHIPPED | OUTPATIENT
Start: 2024-07-30

## 2024-08-20 RX ORDER — TICAGRELOR 90 MG/1
90 TABLET ORAL 2 TIMES DAILY
Qty: 180 TABLET | Refills: 0 | Status: SHIPPED | OUTPATIENT
Start: 2024-08-20

## 2024-09-16 RX ORDER — ISOSORBIDE MONONITRATE 60 MG/1
60 TABLET, EXTENDED RELEASE ORAL DAILY
Qty: 90 TABLET | Refills: 0 | Status: SHIPPED | OUTPATIENT
Start: 2024-09-16

## 2024-11-11 RX ORDER — FUROSEMIDE 20 MG/1
TABLET ORAL
Qty: 30 TABLET | Refills: 0 | Status: SHIPPED | OUTPATIENT
Start: 2024-11-11

## 2024-12-09 RX ORDER — ISOSORBIDE MONONITRATE 60 MG/1
60 TABLET, EXTENDED RELEASE ORAL DAILY
Qty: 90 TABLET | Refills: 0 | Status: SHIPPED | OUTPATIENT
Start: 2024-12-09

## 2024-12-12 ENCOUNTER — TRANSCRIBE ORDERS (OUTPATIENT)
Dept: ADMINISTRATIVE | Facility: HOSPITAL | Age: 69
End: 2024-12-12
Payer: COMMERCIAL

## 2024-12-12 ENCOUNTER — LAB (OUTPATIENT)
Dept: LAB | Facility: HOSPITAL | Age: 69
End: 2024-12-12
Payer: COMMERCIAL

## 2024-12-12 DIAGNOSIS — N18.31 CHRONIC KIDNEY DISEASE (CKD) STAGE G3A/A1, MODERATELY DECREASED GLOMERULAR FILTRATION RATE (GFR) BETWEEN 45-59 ML/MIN/1.73 SQUARE METER AND ALBUMINURIA CREATININE RATIO LESS THAN 30 MG/G (CMS/H*: ICD-10-CM

## 2024-12-12 DIAGNOSIS — N18.31 CHRONIC KIDNEY DISEASE (CKD) STAGE G3A/A1, MODERATELY DECREASED GLOMERULAR FILTRATION RATE (GFR) BETWEEN 45-59 ML/MIN/1.73 SQUARE METER AND ALBUMINURIA CREATININE RATIO LESS THAN 30 MG/G (CMS/H*: Primary | ICD-10-CM

## 2024-12-12 LAB
ANION GAP SERPL CALCULATED.3IONS-SCNC: 11 MMOL/L (ref 5–15)
BILIRUB UR QL STRIP: NEGATIVE
BUN SERPL-MCNC: 20 MG/DL (ref 8–23)
BUN/CREAT SERPL: 11.6 (ref 7–25)
CALCIUM SPEC-SCNC: 9.4 MG/DL (ref 8.6–10.5)
CHLORIDE SERPL-SCNC: 100 MMOL/L (ref 98–107)
CLARITY UR: CLEAR
CO2 SERPL-SCNC: 27 MMOL/L (ref 22–29)
COLOR UR: YELLOW
CREAT SERPL-MCNC: 1.72 MG/DL (ref 0.76–1.27)
CREAT UR-MCNC: 79.4 MG/DL
EGFRCR SERPLBLD CKD-EPI 2021: 42.2 ML/MIN/1.73
GLUCOSE SERPL-MCNC: 50 MG/DL (ref 65–99)
GLUCOSE UR STRIP-MCNC: NEGATIVE MG/DL
HGB UR QL STRIP.AUTO: NEGATIVE
HOLD SPECIMEN: NORMAL
KETONES UR QL STRIP: NEGATIVE
LEUKOCYTE ESTERASE UR QL STRIP.AUTO: NEGATIVE
NITRITE UR QL STRIP: NEGATIVE
PH UR STRIP.AUTO: 5.5 [PH] (ref 5–8)
POTASSIUM SERPL-SCNC: 4.3 MMOL/L (ref 3.5–5.2)
PROT ?TM UR-MCNC: 7.1 MG/DL
PROT UR QL STRIP: NEGATIVE
PROT/CREAT UR: 89.4 MG/G CREA (ref 0–200)
SODIUM SERPL-SCNC: 138 MMOL/L (ref 136–145)
SP GR UR STRIP: 1.01 (ref 1–1.03)
UROBILINOGEN UR QL STRIP: NORMAL

## 2024-12-12 PROCEDURE — 82570 ASSAY OF URINE CREATININE: CPT

## 2024-12-12 PROCEDURE — 80048 BASIC METABOLIC PNL TOTAL CA: CPT

## 2024-12-12 PROCEDURE — 84156 ASSAY OF PROTEIN URINE: CPT

## 2024-12-12 PROCEDURE — 36415 COLL VENOUS BLD VENIPUNCTURE: CPT

## 2024-12-12 PROCEDURE — 81003 URINALYSIS AUTO W/O SCOPE: CPT

## 2024-12-20 RX ORDER — CARVEDILOL 6.25 MG/1
6.25 TABLET ORAL 2 TIMES DAILY
Qty: 180 TABLET | Refills: 0 | Status: SHIPPED | OUTPATIENT
Start: 2024-12-20

## 2024-12-30 RX ORDER — FUROSEMIDE 20 MG/1
TABLET ORAL
Qty: 90 TABLET | Refills: 3 | Status: SHIPPED | OUTPATIENT
Start: 2024-12-30

## 2025-01-31 ENCOUNTER — OFFICE VISIT (OUTPATIENT)
Dept: CARDIOLOGY | Facility: CLINIC | Age: 70
End: 2025-01-31
Payer: COMMERCIAL

## 2025-01-31 VITALS
WEIGHT: 284 LBS | DIASTOLIC BLOOD PRESSURE: 57 MMHG | HEART RATE: 88 BPM | RESPIRATION RATE: 18 BRPM | OXYGEN SATURATION: 94 % | BODY MASS INDEX: 38.47 KG/M2 | HEIGHT: 72 IN | SYSTOLIC BLOOD PRESSURE: 122 MMHG

## 2025-01-31 DIAGNOSIS — Z95.1 HX OF CABG: ICD-10-CM

## 2025-01-31 DIAGNOSIS — I25.10 CORONARY ARTERY DISEASE INVOLVING NATIVE CORONARY ARTERY OF NATIVE HEART WITHOUT ANGINA PECTORIS: Primary | ICD-10-CM

## 2025-01-31 DIAGNOSIS — I10 ESSENTIAL HYPERTENSION: ICD-10-CM

## 2025-01-31 DIAGNOSIS — I25.10 CORONARY ARTERY DISEASE INVOLVING NATIVE CORONARY ARTERY OF NATIVE HEART, UNSPECIFIED WHETHER ANGINA PRESENT: ICD-10-CM

## 2025-01-31 DIAGNOSIS — Z72.0 TOBACCO ABUSE: Chronic | ICD-10-CM

## 2025-01-31 DIAGNOSIS — E78.2 MIXED HYPERLIPIDEMIA: ICD-10-CM

## 2025-01-31 PROCEDURE — 99214 OFFICE O/P EST MOD 30 MIN: CPT | Performed by: INTERNAL MEDICINE

## 2025-02-17 RX ORDER — TICAGRELOR 90 MG/1
90 TABLET ORAL 2 TIMES DAILY
Qty: 180 TABLET | Refills: 0 | Status: SHIPPED | OUTPATIENT
Start: 2025-02-17

## 2025-03-14 RX ORDER — CARVEDILOL 6.25 MG/1
6.25 TABLET ORAL 2 TIMES DAILY
Qty: 180 TABLET | Refills: 0 | Status: SHIPPED | OUTPATIENT
Start: 2025-03-14

## 2025-04-14 RX ORDER — ISOSORBIDE MONONITRATE 60 MG/1
60 TABLET, EXTENDED RELEASE ORAL DAILY
Qty: 90 TABLET | Refills: 0 | Status: SHIPPED | OUTPATIENT
Start: 2025-04-14

## 2025-06-11 RX ORDER — CARVEDILOL 6.25 MG/1
6.25 TABLET ORAL 2 TIMES DAILY
Qty: 180 TABLET | Refills: 0 | Status: SHIPPED | OUTPATIENT
Start: 2025-06-11

## 2025-06-12 RX ORDER — TICAGRELOR 90 MG/1
90 TABLET, FILM COATED ORAL 2 TIMES DAILY
Qty: 180 TABLET | Refills: 0 | Status: SHIPPED | OUTPATIENT
Start: 2025-06-12

## 2025-06-26 ENCOUNTER — LAB (OUTPATIENT)
Dept: LAB | Facility: HOSPITAL | Age: 70
End: 2025-06-26
Payer: COMMERCIAL

## 2025-06-26 ENCOUNTER — TRANSCRIBE ORDERS (OUTPATIENT)
Dept: ADMINISTRATIVE | Facility: HOSPITAL | Age: 70
End: 2025-06-26
Payer: COMMERCIAL

## 2025-06-26 DIAGNOSIS — N18.31 CHRONIC KIDNEY DISEASE (CKD) STAGE G3A/A1, MODERATELY DECREASED GLOMERULAR FILTRATION RATE (GFR) BETWEEN 45-59 ML/MIN/1.73 SQUARE METER AND ALBUMINURIA CREATININE RATIO LESS THAN 30 MG/G (CMS/H*: Primary | ICD-10-CM

## 2025-06-26 DIAGNOSIS — N18.31 CHRONIC KIDNEY DISEASE (CKD) STAGE G3A/A1, MODERATELY DECREASED GLOMERULAR FILTRATION RATE (GFR) BETWEEN 45-59 ML/MIN/1.73 SQUARE METER AND ALBUMINURIA CREATININE RATIO LESS THAN 30 MG/G (CMS/H*: ICD-10-CM

## 2025-06-26 LAB
25(OH)D3 SERPL-MCNC: 17.3 NG/ML (ref 30–100)
ANION GAP SERPL CALCULATED.3IONS-SCNC: 11.1 MMOL/L (ref 5–15)
BACTERIA UR QL AUTO: NORMAL /HPF
BILIRUB UR QL STRIP: NEGATIVE
BUN SERPL-MCNC: 19.2 MG/DL (ref 8–23)
BUN/CREAT SERPL: 11.4 (ref 7–25)
CALCIUM SPEC-SCNC: 9.8 MG/DL (ref 8.6–10.5)
CHLORIDE SERPL-SCNC: 102 MMOL/L (ref 98–107)
CLARITY UR: CLEAR
CO2 SERPL-SCNC: 26.9 MMOL/L (ref 22–29)
COLOR UR: YELLOW
CREAT SERPL-MCNC: 1.68 MG/DL (ref 0.76–1.27)
CREAT UR-MCNC: 56.5 MG/DL
EGFRCR SERPLBLD CKD-EPI 2021: 43.4 ML/MIN/1.73
GLUCOSE SERPL-MCNC: 92 MG/DL (ref 65–99)
GLUCOSE UR STRIP-MCNC: ABNORMAL MG/DL
HGB UR QL STRIP.AUTO: ABNORMAL
HYALINE CASTS UR QL AUTO: NORMAL /LPF
KETONES UR QL STRIP: NEGATIVE
LEUKOCYTE ESTERASE UR QL STRIP.AUTO: NEGATIVE
NITRITE UR QL STRIP: NEGATIVE
PH UR STRIP.AUTO: 5.5 [PH] (ref 5–8)
POTASSIUM SERPL-SCNC: 4.9 MMOL/L (ref 3.5–5.2)
PROT ?TM UR-MCNC: 12.9 MG/DL
PROT UR QL STRIP: NEGATIVE
PROT/CREAT UR: 228.3 MG/G CREA (ref 0–200)
RBC # UR STRIP: NORMAL /HPF
REF LAB TEST METHOD: NORMAL
SODIUM SERPL-SCNC: 140 MMOL/L (ref 136–145)
SP GR UR STRIP: 1.01 (ref 1–1.03)
SQUAMOUS #/AREA URNS HPF: NORMAL /HPF
UROBILINOGEN UR QL STRIP: ABNORMAL
WBC # UR STRIP: NORMAL /HPF

## 2025-06-26 PROCEDURE — 81001 URINALYSIS AUTO W/SCOPE: CPT

## 2025-06-26 PROCEDURE — 80048 BASIC METABOLIC PNL TOTAL CA: CPT

## 2025-06-26 PROCEDURE — 82306 VITAMIN D 25 HYDROXY: CPT

## 2025-06-26 PROCEDURE — 36415 COLL VENOUS BLD VENIPUNCTURE: CPT

## 2025-06-26 PROCEDURE — 84156 ASSAY OF PROTEIN URINE: CPT

## 2025-06-26 PROCEDURE — 82570 ASSAY OF URINE CREATININE: CPT

## 2025-07-15 RX ORDER — ISOSORBIDE MONONITRATE 60 MG/1
60 TABLET, EXTENDED RELEASE ORAL DAILY
Qty: 90 TABLET | Refills: 0 | Status: SHIPPED | OUTPATIENT
Start: 2025-07-15

## 2025-07-21 RX ORDER — TICAGRELOR 90 MG/1
90 TABLET, FILM COATED ORAL 2 TIMES DAILY
Qty: 180 TABLET | Refills: 0 | Status: SHIPPED | OUTPATIENT
Start: 2025-07-21

## 2025-07-22 NOTE — DISCHARGE SUMMARY
[facility]         DISCHARGE SUMMARY    Patient Name: Jamil Self  : 1955  MRN: [id number]    Date of Admission: 10/24/2023  Date of Discharge:  10/26/2023  Primary Care Physician: Desiree Almaguer APRN    Consults       Date and Time Order Name Status Description    10/25/2023  7:18 AM Inpatient Nephrology Consult Completed     10/25/2023  4:03 AM Inpatient Cardiology Consult      10/25/2023  2:43 AM Hospitalist (on-call MD unless specified)              Presenting Problem:   Unstable angina [I20.0]  Unstable angina pectoris [I20.0]  Acute coronary syndrome [I24.9]  NSTEMI (non-ST elevated myocardial infarction) [I21.4]    Active and Resolved Hospital Problems:  Active Hospital Problems    Diagnosis POA    **NSTEMI (non-ST elevated myocardial infarction) [I21.4] Yes    Unstable angina [I20.0] Unknown      Resolved Hospital Problems   No resolved problems to display.         Hospital Course     Hospital Course:  Jamil Self is a 67 y.o. male with past medical history of coronary artery disease status post PCI and CABG, hypertension, hyperlipidemia, diabetes, obstructive sleep apnea, tobacco dependency and chronic kidney disease who presented to [facility] on 10/24/2023 complaining of this pain     Patient reports pain has been intermittent over the past couple weeks.  Normally pain will be present when getting ready for work or on his way home from work but not usually during work hours where he sits at a computer.  He had gone to see his primary cardiologist 5 days ago and was diagnosed with unstable angina and was placed on Lasix and Entresto.  Patient reports pain resolved until last night when he was watching television and had a sudden onset of chest pain that was stabbing in the left-sided chest.  He reports he took a nitroglycerin and initially pain had subsided but then returned so he took a second nitroglycerin and this time pain did not subside and he came to  MRI order placed.    the ER for evaluation.  Patient denies any increase in shortness of breath or nausea.  Pain did not radiate.  Positive for diaphoresis.     In the ED patient was placed on nitroglycerin, heparin drip and Integrilin.  Pain is now subsided.  He has been diagnosed with an NSTEMI as troponins have trended from 15-98.  Plan is for patient to have a cardiac cath today with his primary cardiologist as he had previously been scheduled.       Patient was evaluated by cardiologist patient had a cardiac catheterization done    1.  Left heart catheterization coronary angiography left ventriculography in AMARAL position with imaging of native vessels and bypass grafts  2.,  Percutaneous coronary invention with balloon angioplasty of the distal left main, ostial proximal LAD and ostial proximal circumflex  3.  PCI with Xience 2.5 x 15 drug-eluting stent to the ostial proximal LAD postdilated 3.0 at 18 justin reducing stenosis from greater than 95% to 0% residual  4.  Percutaneous coronary invention PCI with Xience 2.5 x 23 Xience drug-eluting stent ostial proximal circumflex back to the ostial left main postdilated 3.0-3.2 distally avoiding the distal edge and the circumflex ostium and 3.5 x 8 NC balloon at 18 justin in the left main back to the ostium, reduction of stenosis less than 2% residual at the ostium of the circumflex, reduction of 80% stenosis to 90% stenosis in the distal left main to 0% residual  RYLIE II flow preprocedurally, RYLIE-3 flow post procedurally    Patient was cleared to be discharged by cardiology today, he will be going home he is to follow-up with his primary care physician in 3 to 5 days as well as his cardiologist as scheduled.      Day of Discharge     Vital Signs:  Temp:  [97.4 °F (36.3 °C)-98.9 °F (37.2 °C)] 98.9 °F (37.2 °C)  Heart Rate:  [72-93] 93  Resp:  [12-21] 16  BP: ()/() 124/72  Flow (L/min):  [4] 4  Physical Exam:  Constitutional: Awake, alert   Eyes: PERRLA, sclerae anicteric, no  conjunctival injection   HENT: NCAT, mucous membranes moist   Neck: Supple, no thyromegaly, no lymphadenopathy, trachea midline   Respiratory: Clear to auscultation bilaterally, nonlabored respirations    Cardiovascular: RRR, no murmurs, rubs, or gallops, palpable pedal pulses bilaterally   Gastrointestinal: Positive bowel sounds, soft, nontender, nondistended   Musculoskeletal: No bilateral ankle edema, no clubbing or cyanosis to extremities   Psychiatric: Appropriate affect, cooperative   Neurologic: Oriented x 3, strength symmetric in all extremities, Cranial Nerves grossly intact to confrontation, speech clear   Skin: No rashes     Pertinent  and/or Most Recent Results     LAB RESULTS:      Lab 10/26/23  0006 10/25/23  0541 10/25/23  0159 10/24/23  2303 10/24/23  0951   WBC 9.20  --  8.90 8.90 6.30   HEMOGLOBIN 12.4*  --  13.8 14.3 13.8   HEMATOCRIT 36.8*  --  41.1 43.4 41.8   PLATELETS 207  --  239 250 235   NEUTROS ABS  --   --   --  6.30  --    LYMPHS ABS  --   --   --  1.60  --    MONOS ABS  --   --   --  0.60  --    EOS ABS  --   --   --  0.30  --    MCV 91.1  --  92.5 92.4 93.7   PROTIME  --   --   --  9.9 9.8   APTT  --  22.2*  --  26.3*  --          Lab 10/26/23  0006 10/25/23  0159 10/24/23  2303 10/24/23  0951   SODIUM 138  --  140 139   POTASSIUM 4.3  --  4.1 4.6   CHLORIDE 103  --  100 103   CO2 27.0  --  28.0 26.7   ANION GAP 8.0  --  12.0 9.3   BUN 17  --  20 21   CREATININE 1.31*  --  1.69* 1.66*   EGFR 58.9*  --  43.7* 44.6*   GLUCOSE 120*  --  205* 130*   CALCIUM 9.1  --  10.0 9.8   PHOSPHORUS 3.3  --   --   --    HEMOGLOBIN A1C  --  6.20*  --   --          Lab 10/26/23  0006 10/24/23  2303   TOTAL PROTEIN  --  7.6   ALBUMIN 3.7 4.9   GLOBULIN  --  2.7   ALT (SGPT)  --  9   AST (SGOT)  --  7   BILIRUBIN  --  0.3   ALK PHOS  --  93         Lab 10/25/23  0358 10/25/23  0159 10/24/23  0415 10/24/23  0951   PROBNP  --   --  149.7  --    HSTROP T 274* 98* 15*  --    PROTIME  --   --  9.9 9.8   INR   --   --  <0.93* <0.93*         Lab 10/26/23  0006 10/25/23  0358   CHOLESTEROL 90 102   LDL CHOL 32 41   HDL CHOL 36* 42   TRIGLYCERIDES 124 102             Brief Urine Lab Results  (Last result in the past 365 days)        Color   Clarity   Blood   Leuk Est   Nitrite   Protein   CREAT   Urine HCG        10/25/23 1330             59.2         10/25/23 1330 Yellow   Clear   Small (1+)   Negative   Negative   30 mg/dL (1+)                 Microbiology Results (last 10 days)       ** No results found for the last 240 hours. **            XR Chest 1 View    Result Date: 10/24/2023  Impression: Impression: No active disease Electronically Signed: Ezequiel Hedrick MD  10/24/2023 11:30 PM EDT  Workstation ID: [id number]    XR Chest 1 View    Result Date: 10/12/2023  Impression: Impression: No acute cardiopulmonary abnormality. Electronically Signed: Jr Subramanian MD  10/12/2023 12:47 AM EDT  Workstation ID: [id number]     Results for orders placed during the hospital encounter of 08/08/22    Venous w Reflux Lower Extremity - Right/Left CAR    Interpretation Summary  · Deep venous reflux right femoral vein.  · Superficial venous reflux right saphenofemoral junction and greater saphenous vein.  · No deep or superficial venous thrombus.      Results for orders placed during the hospital encounter of 08/08/22    Venous w Reflux Lower Extremity - Right/Left CAR    Interpretation Summary  · Deep venous reflux right femoral vein.  · Superficial venous reflux right saphenofemoral junction and greater saphenous vein.  · No deep or superficial venous thrombus.          Labs Pending at Discharge:        Discharge Details        Discharge Medications        New Medications        Instructions Start Date   ticagrelor 90 MG tablet tablet  Commonly known as: BRILINTA   90 mg, Oral, 2 Times Daily             Continue These Medications        Instructions Start Date   amLODIPine 10 MG tablet  Commonly known as: NORVASC   1 tablet, Oral, Daily       Aspirin Adult Low Strength 81 MG EC tablet  Generic drug: aspirin   81 mg, Oral, Daily      atorvastatin 20 MG tablet  Commonly known as: LIPITOR   20 mg, Oral, Daily      carvedilol 6.25 MG tablet  Commonly known as: COREG   6.25 mg, Oral, 2 Times Daily      furosemide 20 MG tablet  Commonly known as: LASIX   20 mg, Oral, Daily      hydrALAZINE 25 MG tablet  Commonly known as: APRESOLINE   25 mg, Oral, 2 Times Daily      isosorbide mononitrate 30 MG 24 hr tablet  Commonly known as: IMDUR   60 mg, Oral, Daily      losartan 100 MG tablet  Commonly known as: COZAAR   100 mg, Oral, Daily      metFORMIN 1000 MG tablet  Commonly known as: GLUCOPHAGE   500 mg, Oral, 2 Times Daily With Meals             Stop These Medications      clopidogrel 75 MG tablet  Commonly known as: PLAVIX              No Known Allergies      Discharge Disposition:   Home or Self Care    Discharge Condition: .cond    Diet:  Hospital:  Diet Order   Procedures    Diet: Cardiac Diets; Healthy Heart (2-3 Na+); Texture: Regular Texture (IDDSI 7); Fluid Consistency: Thin (IDDSI 0)         Discharge Activity:         CODE STATUS:  Code Status and Medical Interventions:   Ordered at: 10/25/23 0413     Code Status (Patient has no pulse and is not breathing):    CPR (Attempt to Resuscitate)     Medical Interventions (Patient has pulse or is breathing):    Full Support         Future Appointments   Date Time Provider Department Center   1/23/2024  2:30 PM Dev Lawton MD [facility]       Additional Instructions for the Follow-ups that You Need to Schedule       Ambulatory Referral to Cardiac Rehab   As directed              Time spent on Discharge including face to face service:  30 minutes    Teddy Harrison MD

## 2025-07-28 ENCOUNTER — TELEPHONE (OUTPATIENT)
Dept: CARDIOLOGY | Facility: CLINIC | Age: 70
End: 2025-07-28
Payer: COMMERCIAL

## 2025-07-28 NOTE — TELEPHONE ENCOUNTER
He picked up his Brilinta and imdur from [facility]  he thinks he left them in his car and can't find them  Wanted to know if they could be resent in Is there anything he can do for insurance to pay for them

## 2025-07-29 RX ORDER — TICAGRELOR 90 MG/1
90 TABLET, FILM COATED ORAL 2 TIMES DAILY
Qty: 180 TABLET | Refills: 0 | Status: SHIPPED | OUTPATIENT
Start: 2025-07-29

## 2025-07-29 RX ORDER — ISOSORBIDE MONONITRATE 60 MG/1
60 TABLET, EXTENDED RELEASE ORAL DAILY
Qty: 90 TABLET | Refills: 0 | Status: SHIPPED | OUTPATIENT
Start: 2025-07-29

## 2025-07-29 NOTE — TELEPHONE ENCOUNTER
PATIENT WAS INFORMED THAT IM ABLE TO RESEND BUT ITS UP TO INSURANCE, HE CAN DISCUSS WITH THEM COSTS.

## (undated) DEVICE — CATH DIAG IMPULSE FR4 6F 100CM

## (undated) DEVICE — STPCK 3WY HP ROT

## (undated) DEVICE — DEV INFL COMPAK W/ACCESSPLUS IN4530

## (undated) DEVICE — CATH DIAG IMPULSE IMT 6F 100CM

## (undated) DEVICE — HI-TORQUE BALANCE MIDDLEWEIGHT UNIVERSAL II GUIDE WIRE STRAIGHT TIP PAK  190 CM: Brand: HI-TORQUE BALANCE MIDDLEWEIGHT UNIVERSAL II

## (undated) DEVICE — GW RUNTHROUGH NS HYPERCOAT .014 3X180CM

## (undated) DEVICE — ANGIO-SEAL VIP VASCULAR CLOSURE DEVICE: Brand: ANGIO-SEAL

## (undated) DEVICE — MINI TREK CORONARY DILATATION CATHETER 1.50 MM X 20 MM / RAPID-EXCHANGE: Brand: MINI TREK

## (undated) DEVICE — GW EMR FIX EXCHG J STD .035 3MM 260CM

## (undated) DEVICE — GUIDELINER CATHETERS ARE INTENDED TO BE USED IN CONJUNCTION WITH GUIDE CATHETERS TO ACCESS DISCRETE REGIONS OF THE CORONARY AND/OR PERIPHERAL VASCULATURE, AND TO FACILITATE PLACEMENT OF INTERVENTIONAL DEVICES.: Brand: GUIDELINER® V3 CATHETER

## (undated) DEVICE — PINNACLE INTRODUCER SHEATH: Brand: PINNACLE

## (undated) DEVICE — GW PTFE EMERALD HEPCOAT FC J TIP STD .035 3MM 150CM

## (undated) DEVICE — NC TREK CORONARY DILATATION CATHETER 2.5 MM X 12 MM / RAPID-EXCHANGE: Brand: NC TREK

## (undated) DEVICE — CATH GUIDE LAUNCHER EBU4 7F

## (undated) DEVICE — DOC GUIDE WIRE EXTENSION: Brand: DOC

## (undated) DEVICE — ANGIOGRAPHIC CATHETER: Brand: IMPULSE™

## (undated) DEVICE — KT PK ANGIOPLASTY ACC 9 MERIT

## (undated) DEVICE — ST ACC MICROPUNCTURE STFF/CANN PLAT/TP 4F 21G 40CM

## (undated) DEVICE — PK TRY HEART CATH 50

## (undated) DEVICE — GUIDE CATHETER: Brand: MACH1™

## (undated) DEVICE — NC TREK NEO™ CORONARY DILATATION CATHETER 2.50 X 15 MM / RAPID-EXCHANGE: Brand: NC TREK NEO™

## (undated) DEVICE — SYR LL TP 10ML STRL

## (undated) DEVICE — NC TREK NEO™ CORONARY DILATATION CATHETER 2.75 MM X 12 MM / RAPID-EXCHANGE: Brand: NC TREK NEO™

## (undated) DEVICE — CONTRST ISOVUE300 61PCT 50ML

## (undated) DEVICE — ELECTRD DEFIB M/FUNC PROPADZ RADIOL 2PK

## (undated) DEVICE — CATH DIAG IMPULSE MPA 6F 100CM

## (undated) DEVICE — CATH DIAG IMPULSE FL4 6F 100CM

## (undated) DEVICE — BOWL PLSTC MD 16OZ BLU STRL

## (undated) DEVICE — NC TREK CORONARY DILATATION CATHETER 2.0 MM X 20 MM / RAPID-EXCHANGE: Brand: NC TREK

## (undated) DEVICE — NC TREK NEO™ CORONARY DILATATION CATHETER 3.50 MM X 8 MM / RAPID-EXCHANGE: Brand: NC TREK NEO™

## (undated) DEVICE — NC TREK™ CORONARY DILATATION CATHETER 4.5 MM X 20 MM / RAPID-EXCHANGE: Brand: NC TREK™

## (undated) DEVICE — CATH DIAG EXPO .052 MPA2 6F 100CM

## (undated) DEVICE — NC TREK NEO™ CORONARY DILATATION CATHETER 2.25 MM X 12 MM / RAPID-EXCHANGE: Brand: NC TREK NEO™

## (undated) DEVICE — TBG NAMIC PRESS MONTR A/ F/M 12IN

## (undated) DEVICE — NC TREK NEO™ CORONARY DILATATION CATHETER 3.00 MM X 12 MM / RAPID-EXCHANGE: Brand: NC TREK NEO™

## (undated) DEVICE — CATH DIAG IMPULSE PIG .056 6F 110CM

## (undated) DEVICE — CATH MPAC STP 6F: Brand: SUPER TORQUE